# Patient Record
Sex: FEMALE | Race: WHITE | NOT HISPANIC OR LATINO | Employment: OTHER | URBAN - METROPOLITAN AREA
[De-identification: names, ages, dates, MRNs, and addresses within clinical notes are randomized per-mention and may not be internally consistent; named-entity substitution may affect disease eponyms.]

---

## 2017-09-06 ENCOUNTER — HISTORICAL (OUTPATIENT)
Dept: ADMINISTRATIVE | Facility: HOSPITAL | Age: 62
End: 2017-09-06

## 2017-09-06 LAB
CHOLEST SERPL-MCNC: 203 MG/DL (ref 100–199)
HDLC SERPL-MCNC: 66 MG/DL
LDLC SERPL CALC-MCNC: 115 MG/DL (ref 0–99)
TRIGL SERPL-MCNC: 108 MG/DL (ref 0–149)
VLDLC SERPL CALC-MCNC: 22 MG/DL (ref 5–40)

## 2018-09-11 ENCOUNTER — HISTORICAL (OUTPATIENT)
Dept: ADMINISTRATIVE | Facility: HOSPITAL | Age: 63
End: 2018-09-11

## 2018-11-07 LAB
BILIRUB SERPL-MCNC: NEGATIVE MG/DL
BLOOD URINE, POC: NORMAL
CLARITY, POC UA: CLEAR
COLOR, POC UA: NORMAL
GLUCOSE UR QL STRIP: NORMAL
KETONES UR QL STRIP: NEGATIVE
LEUKOCYTE EST, POC UA: NORMAL
NITRITE, POC UA: NEGATIVE
PH, POC UA: 6.5
PROTEIN, POC: NEGATIVE
SPECIFIC GRAVITY, POC UA: 1.02
UROBILINOGEN, POC UA: NORMAL

## 2019-08-28 ENCOUNTER — HISTORICAL (OUTPATIENT)
Dept: ADMINISTRATIVE | Facility: HOSPITAL | Age: 64
End: 2019-08-28

## 2019-08-28 LAB
ALBUMIN SERPL-MCNC: 4.3 G/DL (ref 3.6–4.8)
ALBUMIN/GLOB SERPL: 2.2 {RATIO} (ref 1.2–2.2)
ALP SERPL-CCNC: 75 IU/L (ref 39–117)
ALT SERPL-CCNC: 18 IU/L (ref 0–32)
AST SERPL-CCNC: 20 IU/L (ref 0–40)
BASOPHILS # BLD AUTO: 0 X10E3/UL (ref 0–0.2)
BASOPHILS NFR BLD AUTO: 1 %
BILIRUB SERPL-MCNC: 0.9 MG/DL (ref 0–1.2)
BUN SERPL-MCNC: 17 MG/DL (ref 8–27)
CALCIUM SERPL-MCNC: 9.5 MG/DL (ref 8.7–10.3)
CHLORIDE SERPL-SCNC: 104 MMOL/L (ref 96–106)
CHOLEST SERPL-MCNC: 207 MG/DL (ref 100–199)
CHOLEST/HDLC SERPL: 2.6 RATIO (ref 0–4.4)
CO2 SERPL-SCNC: 23 MMOL/L (ref 20–29)
CREAT SERPL-MCNC: 0.85 MG/DL (ref 0.57–1)
CREAT/UREA NIT SERPL: 20 (ref 12–28)
DEPRECATED CALCIDIOL+CALCIFEROL SERPL-MC: 34.7 NG/ML (ref 30–100)
EOSINOPHIL # BLD AUTO: 0.3 X10E3/UL (ref 0–0.4)
EOSINOPHIL NFR BLD AUTO: 6 %
ERYTHROCYTE [DISTWIDTH] IN BLOOD BY AUTOMATED COUNT: 12.9 % (ref 12.3–15.4)
GLOBULIN SER-MCNC: 2 G/DL (ref 1.5–4.5)
GLUCOSE SERPL-MCNC: 73 MG/DL (ref 65–99)
HCT VFR BLD AUTO: 43.9 % (ref 34–46.6)
HDLC SERPL-MCNC: 81 MG/DL
HGB BLD-MCNC: 14.4 G/DL (ref 11.1–15.9)
LDLC SERPL CALC-MCNC: 110 MG/DL (ref 0–99)
LYMPHOCYTES # BLD AUTO: 1.4 X10E3/UL (ref 0.7–3.1)
LYMPHOCYTES NFR BLD AUTO: 30 %
MCH RBC QN AUTO: 33.7 PG (ref 26.6–33)
MCHC RBC AUTO-ENTMCNC: 32.8 G/DL (ref 31.5–35.7)
MCV RBC AUTO: 103 FL (ref 79–97)
MONOCYTES # BLD AUTO: 0.3 X10E3/UL (ref 0.1–0.9)
MONOCYTES NFR BLD AUTO: 6 %
NEUTROPHILS # BLD AUTO: 2.7 X10E3/UL (ref 1.4–7)
NEUTROPHILS NFR BLD AUTO: 57 %
PLATELET # BLD AUTO: 188 X10E3/UL (ref 150–450)
POTASSIUM SERPL-SCNC: 4.3 MMOL/L (ref 3.5–5.2)
PROT SERPL-MCNC: 6.3 G/DL (ref 6–8.5)
RBC # BLD AUTO: 4.27 X10(6)/MCL (ref 3.77–5.28)
SODIUM SERPL-SCNC: 144 MMOL/L (ref 134–144)
TRIGL SERPL-MCNC: 78 MG/DL (ref 0–149)
TSH SERPL-ACNC: 2.39 MIU/ML (ref 0.45–4.5)
VLDLC SERPL CALC-MCNC: 16 MG/DL (ref 5–40)
WBC # SPEC AUTO: 4.8 X10E3/UL (ref 3.4–10.8)

## 2020-02-24 ENCOUNTER — HISTORICAL (OUTPATIENT)
Dept: ADMINISTRATIVE | Facility: HOSPITAL | Age: 65
End: 2020-02-24

## 2020-02-24 LAB
ALBUMIN SERPL-MCNC: 4.6 G/DL (ref 3.8–4.8)
ALBUMIN/GLOB SERPL: 2.6 {RATIO} (ref 1.2–2.2)
ALP SERPL-CCNC: 68 IU/L (ref 39–117)
ALT SERPL-CCNC: 20 IU/L (ref 0–32)
AST SERPL-CCNC: 18 IU/L (ref 0–40)
BASOPHILS # BLD AUTO: 0.1 X10E3/UL (ref 0–0.2)
BASOPHILS NFR BLD AUTO: 1 %
BILIRUB SERPL-MCNC: 0.6 MG/DL (ref 0–1.2)
BUN SERPL-MCNC: 20 MG/DL (ref 8–27)
CALCIUM SERPL-MCNC: 9.5 MG/DL (ref 8.7–10.3)
CHLORIDE SERPL-SCNC: 105 MMOL/L (ref 96–106)
CHOLEST SERPL-MCNC: 238 MG/DL (ref 100–199)
CHOLEST/HDLC SERPL: 2.9 RATIO (ref 0–4.4)
CO2 SERPL-SCNC: 27 MMOL/L (ref 20–29)
CREAT SERPL-MCNC: 0.91 MG/DL (ref 0.57–1)
CREAT/UREA NIT SERPL: 22 (ref 12–28)
EOSINOPHIL # BLD AUTO: 0.3 X10E3/UL (ref 0–0.4)
EOSINOPHIL NFR BLD AUTO: 6 %
ERYTHROCYTE [DISTWIDTH] IN BLOOD BY AUTOMATED COUNT: 12.3 % (ref 11.7–15.4)
GLOBULIN SER-MCNC: 1.8 G/DL (ref 1.5–4.5)
GLUCOSE SERPL-MCNC: 95 MG/DL (ref 65–99)
HCT VFR BLD AUTO: 45.1 % (ref 34–46.6)
HDLC SERPL-MCNC: 82 MG/DL
HGB BLD-MCNC: 14.6 G/DL (ref 11.1–15.9)
LDLC SERPL CALC-MCNC: 141 MG/DL (ref 0–99)
LYMPHOCYTES # BLD AUTO: 2 X10E3/UL (ref 0.7–3.1)
LYMPHOCYTES NFR BLD AUTO: 42 %
MCH RBC QN AUTO: 32.7 PG (ref 26.6–33)
MCHC RBC AUTO-ENTMCNC: 32.4 G/DL (ref 31.5–35.7)
MCV RBC AUTO: 101 FL (ref 79–97)
MONOCYTES # BLD AUTO: 0.3 X10E3/UL (ref 0.1–0.9)
MONOCYTES NFR BLD AUTO: 7 %
NEUTROPHILS # BLD AUTO: 2.1 X10E3/UL (ref 1.4–7)
NEUTROPHILS NFR BLD AUTO: 44 %
PLATELET # BLD AUTO: 228 X10E3/UL (ref 150–450)
POTASSIUM SERPL-SCNC: 4.9 MMOL/L (ref 3.5–5.2)
PROT SERPL-MCNC: 6.4 G/DL (ref 6–8.5)
RBC # BLD AUTO: 4.46 X10(6)/MCL (ref 3.77–5.28)
SODIUM SERPL-SCNC: 145 MMOL/L (ref 134–144)
TRIGL SERPL-MCNC: 73 MG/DL (ref 0–149)
TSH SERPL-ACNC: 2.6 MIU/ML (ref 0.45–4.5)
VLDLC SERPL CALC-MCNC: 15 MG/DL (ref 5–40)
WBC # SPEC AUTO: 4.8 X10E3/UL (ref 3.4–10.8)

## 2020-05-20 ENCOUNTER — HISTORICAL (OUTPATIENT)
Dept: ADMINISTRATIVE | Facility: HOSPITAL | Age: 65
End: 2020-05-20

## 2020-05-20 LAB
ALBUMIN SERPL-MCNC: 4.6 G/DL (ref 3.8–4.8)
ALBUMIN/GLOB SERPL: 2.4 {RATIO} (ref 1.2–2.2)
ALP SERPL-CCNC: 72 IU/L (ref 39–117)
ALT SERPL-CCNC: 20 IU/L (ref 0–32)
AST SERPL-CCNC: 24 IU/L (ref 0–40)
BILIRUB SERPL-MCNC: 0.5 MG/DL (ref 0–1.2)
BUN SERPL-MCNC: 19 MG/DL (ref 8–27)
CALCIUM SERPL-MCNC: 9.8 MG/DL (ref 8.7–10.3)
CHLORIDE SERPL-SCNC: 103 MMOL/L (ref 96–106)
CHOLEST SERPL-MCNC: 212 MG/DL (ref 100–199)
CHOLEST/HDLC SERPL: 2.5 RATIO (ref 0–4.4)
CO2 SERPL-SCNC: 25 MMOL/L (ref 20–29)
CREAT SERPL-MCNC: 0.94 MG/DL (ref 0.57–1)
CREAT/UREA NIT SERPL: 20 (ref 12–28)
DEPRECATED CALCIDIOL+CALCIFEROL SERPL-MC: 32.1 NG/ML (ref 30–100)
GLOBULIN SER-MCNC: 1.9 G/DL (ref 1.5–4.5)
GLUCOSE SERPL-MCNC: 88 MG/DL (ref 65–99)
HDLC SERPL-MCNC: 84 MG/DL
LDLC SERPL CALC-MCNC: 114 MG/DL (ref 0–99)
POTASSIUM SERPL-SCNC: 4.5 MMOL/L (ref 3.5–5.2)
PROT SERPL-MCNC: 6.5 G/DL (ref 6–8.5)
SODIUM SERPL-SCNC: 141 MMOL/L (ref 134–144)
TRIGL SERPL-MCNC: 71 MG/DL (ref 0–149)
TSH SERPL-ACNC: 3.76 MIU/ML (ref 0.45–4.5)
VLDLC SERPL CALC-MCNC: 14 MG/DL (ref 5–40)

## 2020-07-20 ENCOUNTER — HISTORICAL (OUTPATIENT)
Dept: RADIOLOGY | Facility: HOSPITAL | Age: 65
End: 2020-07-20

## 2020-08-17 ENCOUNTER — HISTORICAL (OUTPATIENT)
Dept: ADMINISTRATIVE | Facility: HOSPITAL | Age: 65
End: 2020-08-17

## 2020-08-17 LAB
ABS NEUT (OLG): 3.19
ALBUMIN SERPL-MCNC: 3.8 GM/DL (ref 3.4–4.8)
ALBUMIN/GLOB SERPL: 1.6 RATIO (ref 1.1–2)
ALP SERPL-CCNC: 66 UNIT/L (ref 40–150)
ALT SERPL-CCNC: 21 UNIT/L (ref 0–55)
APPEARANCE, UA: ABNORMAL
AST SERPL-CCNC: 17 UNIT/L (ref 5–34)
BACTERIA SPEC CULT: ABNORMAL
BASOPHILS # BLD AUTO: 0.03 X10(3)/MCL
BASOPHILS NFR BLD AUTO: 0.5 %
BILIRUB SERPL-MCNC: 0.9 MG/DL
BILIRUB UR QL STRIP: NEGATIVE
BILIRUBIN DIRECT+TOT PNL SERPL-MCNC: 0.3 MG/DL (ref 0–0.5)
BILIRUBIN DIRECT+TOT PNL SERPL-MCNC: 0.6 MG/DL
BUN SERPL-MCNC: 14 MG/DL (ref 9.8–20.1)
CALCIUM SERPL-MCNC: 9.3 MG/DL (ref 8.4–10.2)
CHLORIDE SERPL-SCNC: 105 MMOL/L (ref 98–107)
CHOLEST SERPL-MCNC: 227 MG/DL
CHOLEST/HDLC SERPL: 3 {RATIO} (ref 0–5)
CO2 SERPL-SCNC: 30 MEQ/L (ref 23–31)
COLOR UR: YELLOW
CREAT SERPL-MCNC: 0.84 MG/DL (ref 0.55–1.02)
EOSINOPHIL # BLD AUTO: 0.21 X10(3)/MCL
EOSINOPHIL NFR BLD AUTO: 3.7 %
ERYTHROCYTE [DISTWIDTH] IN BLOOD BY AUTOMATED COUNT: 14 %
GLOBULIN SER-MCNC: 2.4 GM/DL (ref 2.4–3.5)
GLUCOSE (UA): NEGATIVE
GLUCOSE SERPL-MCNC: 88 MG/DL (ref 82–115)
HCT VFR BLD AUTO: 45.1 % (ref 34–46)
HDLC SERPL-MCNC: 82 MG/DL (ref 35–60)
HGB BLD-MCNC: 14.4 GM/DL (ref 11.3–15.4)
HGB UR QL STRIP: ABNORMAL
IMM GRANULOCYTES # BLD AUTO: 0 10*3/UL (ref 0–0.1)
IMM GRANULOCYTES NFR BLD AUTO: 0 % (ref 0–1)
KETONES UR QL STRIP: NEGATIVE
LDLC SERPL CALC-MCNC: 126 MG/DL (ref 50–140)
LEUKOCYTE ESTERASE UR QL STRIP: NEGATIVE
LYMPHOCYTES # BLD AUTO: 1.88 X10(3)/MCL
LYMPHOCYTES NFR BLD AUTO: 32.8 %
MCH RBC QN AUTO: 32.4 PG (ref 27–33)
MCHC RBC AUTO-ENTMCNC: 31.9 GM/DL (ref 32–35)
MCV RBC AUTO: 101.6 FL (ref 81–97)
MONOCYTES # BLD AUTO: 0.43 X10(3)/MCL
MONOCYTES NFR BLD AUTO: 7.5 %
NEUTROPHILS # BLD AUTO: 3.19 X10(3)/MCL
NEUTROPHILS NFR BLD AUTO: 55.5 %
NITRITE UR QL STRIP: NEGATIVE
PH UR STRIP: 7 [PH] (ref 4.6–8)
PLATELET # BLD AUTO: 228 X10(3)/MCL (ref 140–450)
PMV BLD AUTO: 11 FL
POTASSIUM SERPL-SCNC: 4.6 MMOL/L (ref 3.5–5.1)
PROT SERPL-MCNC: 6.2 GM/DL (ref 5.8–7.6)
PROT UR QL STRIP: NEGATIVE
RBC # BLD AUTO: 4.44 X10(6)/MCL (ref 3.9–5)
RBC #/AREA URNS HPF: ABNORMAL /HPF
SODIUM SERPL-SCNC: 143 MMOL/L (ref 136–145)
SP GR UR STRIP: 1.02 (ref 1–1.03)
SQUAMOUS EPITHELIAL, UA: ABNORMAL
TRIGL SERPL-MCNC: 96 MG/DL (ref 37–140)
UROBILINOGEN UR STRIP-ACNC: 0.2
VLDLC SERPL CALC-MCNC: 19 MG/DL
WBC # SPEC AUTO: 5.74 X10(3)/MCL (ref 3.4–9.2)
WBC #/AREA URNS HPF: ABNORMAL /HPF

## 2020-12-08 ENCOUNTER — HISTORICAL (OUTPATIENT)
Dept: ADMINISTRATIVE | Facility: HOSPITAL | Age: 65
End: 2020-12-08

## 2020-12-08 LAB
ALBUMIN SERPL-MCNC: 3.9 GM/DL (ref 3.4–4.8)
ALBUMIN/GLOB SERPL: 1.8 RATIO (ref 1.1–2)
ALP SERPL-CCNC: 69 UNIT/L (ref 40–150)
ALT SERPL-CCNC: 26 UNIT/L (ref 0–55)
AST SERPL-CCNC: 18 UNIT/L (ref 5–34)
BILIRUB SERPL-MCNC: 0.6 MG/DL
BILIRUBIN DIRECT+TOT PNL SERPL-MCNC: 0.2 MG/DL (ref 0–0.5)
BILIRUBIN DIRECT+TOT PNL SERPL-MCNC: 0.4 MG/DL
BUN SERPL-MCNC: 14 MG/DL (ref 9.8–20.1)
CALCIUM SERPL-MCNC: 9.4 MG/DL (ref 8.4–10.2)
CHLORIDE SERPL-SCNC: 107 MMOL/L (ref 98–107)
CHOLEST SERPL-MCNC: 185 MG/DL
CHOLEST/HDLC SERPL: 3 {RATIO} (ref 0–5)
CO2 SERPL-SCNC: 30 MEQ/L (ref 23–31)
CREAT SERPL-MCNC: 0.85 MG/DL (ref 0.55–1.02)
GLOBULIN SER-MCNC: 2.2 GM/DL (ref 2.4–3.5)
GLUCOSE SERPL-MCNC: 92 MG/DL (ref 82–115)
HDLC SERPL-MCNC: 67 MG/DL (ref 35–60)
LDLC SERPL CALC-MCNC: 95 MG/DL (ref 50–140)
POTASSIUM SERPL-SCNC: 4.6 MMOL/L (ref 3.5–5.1)
PROT SERPL-MCNC: 6.1 GM/DL (ref 5.8–7.6)
SODIUM SERPL-SCNC: 142 MMOL/L (ref 136–145)
TRIGL SERPL-MCNC: 114 MG/DL (ref 37–140)
VLDLC SERPL CALC-MCNC: 23 MG/DL

## 2021-03-15 ENCOUNTER — HISTORICAL (OUTPATIENT)
Dept: RADIOLOGY | Facility: HOSPITAL | Age: 66
End: 2021-03-15

## 2021-03-19 ENCOUNTER — HISTORICAL (OUTPATIENT)
Dept: RADIOLOGY | Facility: HOSPITAL | Age: 66
End: 2021-03-19

## 2021-06-22 ENCOUNTER — HISTORICAL (OUTPATIENT)
Dept: ADMINISTRATIVE | Facility: HOSPITAL | Age: 66
End: 2021-06-22

## 2021-06-22 LAB
ABS NEUT (OLG): 2.08
ALBUMIN SERPL-MCNC: 4 GM/DL (ref 3.4–4.8)
ALBUMIN/GLOB SERPL: 1.7 RATIO (ref 1.1–2)
ALP SERPL-CCNC: 78 UNIT/L (ref 40–150)
ALT SERPL-CCNC: 28 UNIT/L (ref 0–55)
APPEARANCE, UA: ABNORMAL
AST SERPL-CCNC: 19 UNIT/L (ref 5–34)
BACTERIA SPEC CULT: ABNORMAL
BASOPHILS # BLD AUTO: 0.03 X10(3)/MCL
BASOPHILS NFR BLD AUTO: 0.7 %
BILIRUB SERPL-MCNC: 0.8 MG/DL
BILIRUB UR QL STRIP: NEGATIVE
BILIRUBIN DIRECT+TOT PNL SERPL-MCNC: 0.3 MG/DL (ref 0–0.5)
BILIRUBIN DIRECT+TOT PNL SERPL-MCNC: 0.5 MG/DL
BUN SERPL-MCNC: 16 MG/DL (ref 9.8–20.1)
CALCIUM SERPL-MCNC: 9.4 MG/DL (ref 8.4–10.2)
CHLORIDE SERPL-SCNC: 108 MMOL/L (ref 98–107)
CHOLEST SERPL-MCNC: 184 MG/DL
CHOLEST/HDLC SERPL: 3 {RATIO} (ref 0–5)
CO2 SERPL-SCNC: 32 MEQ/L (ref 23–31)
COLOR UR: YELLOW
CREAT SERPL-MCNC: 0.75 MG/DL (ref 0.55–1.02)
DEPRECATED CALCIDIOL+CALCIFEROL SERPL-MC: 34.4 NG/ML (ref 30–80)
EOSINOPHIL # BLD AUTO: 0.27 X10(3)/MCL
EOSINOPHIL NFR BLD AUTO: 6.1 %
ERYTHROCYTE [DISTWIDTH] IN BLOOD BY AUTOMATED COUNT: 14 %
GLOBULIN SER-MCNC: 2.3 GM/DL (ref 2.4–3.5)
GLUCOSE (UA): NEGATIVE
GLUCOSE SERPL-MCNC: 80 MG/DL (ref 82–115)
HCT VFR BLD AUTO: 43.5 % (ref 34–46)
HDLC SERPL-MCNC: 67 MG/DL (ref 35–60)
HGB BLD-MCNC: 13.8 GM/DL (ref 11.3–15.4)
HGB UR QL STRIP: ABNORMAL
IMM GRANULOCYTES # BLD AUTO: 0.01 10*3/UL (ref 0–0.1)
IMM GRANULOCYTES NFR BLD AUTO: 0.2 % (ref 0–1)
KETONES UR QL STRIP: NEGATIVE
LDLC SERPL CALC-MCNC: 102 MG/DL (ref 50–140)
LEUKOCYTE ESTERASE UR QL STRIP: NEGATIVE
LYMPHOCYTES # BLD AUTO: 1.7 X10(3)/MCL
LYMPHOCYTES NFR BLD AUTO: 38.6 %
MCH RBC QN AUTO: 31.9 PG (ref 27–33)
MCHC RBC AUTO-ENTMCNC: 31.7 GM/DL (ref 32–35)
MCV RBC AUTO: 100.5 FL (ref 81–97)
MONOCYTES # BLD AUTO: 0.31 X10(3)/MCL
MONOCYTES NFR BLD AUTO: 7 %
MUCOUS THREADS URNS QL MICRO: PRESENT
NEUTROPHILS # BLD AUTO: 2.08 X10(3)/MCL
NEUTROPHILS NFR BLD AUTO: 47.4 %
NITRITE UR QL STRIP: NEGATIVE
PH UR STRIP: 6 [PH] (ref 4.6–8)
PLATELET # BLD AUTO: 230 X10(3)/MCL (ref 140–450)
PMV BLD AUTO: 11 FL
POTASSIUM SERPL-SCNC: 4.8 MMOL/L (ref 3.5–5.1)
PROT SERPL-MCNC: 6.3 GM/DL (ref 5.8–7.6)
PROT UR QL STRIP: NEGATIVE
RBC # BLD AUTO: 4.33 X10(6)/MCL (ref 3.9–5)
RBC #/AREA URNS HPF: ABNORMAL /HPF
SODIUM SERPL-SCNC: 147 MMOL/L (ref 136–145)
SP GR UR STRIP: 1.02 (ref 1–1.03)
SQUAMOUS EPITHELIAL, UA: ABNORMAL
TRIGL SERPL-MCNC: 75 MG/DL (ref 37–140)
TSH SERPL-ACNC: 3.61 UIU/ML (ref 0.35–4.94)
UROBILINOGEN UR STRIP-ACNC: 0.2
VLDLC SERPL CALC-MCNC: 15 MG/DL
WBC # SPEC AUTO: 4.4 X10(3)/MCL (ref 3.4–9.2)
WBC #/AREA URNS HPF: ABNORMAL /HPF

## 2021-07-07 ENCOUNTER — HISTORICAL (OUTPATIENT)
Dept: LAB | Facility: HOSPITAL | Age: 66
End: 2021-07-07

## 2021-07-07 LAB — SARS-COV-2 AG RESP QL IA.RAPID: NEGATIVE

## 2021-07-09 ENCOUNTER — HISTORICAL (OUTPATIENT)
Dept: MEDSURG UNIT | Facility: HOSPITAL | Age: 66
End: 2021-07-09

## 2021-08-17 ENCOUNTER — HISTORICAL (OUTPATIENT)
Dept: ADMINISTRATIVE | Facility: HOSPITAL | Age: 66
End: 2021-08-17

## 2021-08-17 LAB — SARS-COV-2 RNA RESP QL NAA+PROBE: DETECTED

## 2022-02-14 ENCOUNTER — HISTORICAL (OUTPATIENT)
Dept: LAB | Facility: HOSPITAL | Age: 67
End: 2022-02-14

## 2022-02-14 LAB
ABS NEUT (OLG): 3.47
ALBUMIN SERPL-MCNC: 4 G/DL (ref 3.4–4.8)
ALBUMIN/GLOB SERPL: 1.5 {RATIO} (ref 1.1–2)
ALP SERPL-CCNC: 75 U/L (ref 40–150)
ALT SERPL-CCNC: 25 U/L (ref 0–55)
APPEARANCE, UA: CLEAR
APTT PPP: 22.5 S (ref 21.4–28.3)
AST SERPL-CCNC: 17 U/L (ref 5–34)
BACTERIA SPEC CULT: NORMAL
BASOPHILS # BLD AUTO: 0.03 10*3/UL
BASOPHILS NFR BLD AUTO: 0.6 %
BILIRUB SERPL-MCNC: 0.8 MG/DL
BILIRUB UR QL STRIP: NEGATIVE
BILIRUBIN DIRECT+TOT PNL SERPL-MCNC: 0.3 (ref 0–0.5)
BILIRUBIN DIRECT+TOT PNL SERPL-MCNC: 0.5
BUN SERPL-MCNC: 12 MG/DL (ref 9.8–20.1)
CALCIUM SERPL-MCNC: 9.5 MG/DL (ref 8.7–10.5)
CHLORIDE SERPL-SCNC: 106 MMOL/L (ref 98–107)
CO2 SERPL-SCNC: 29 MMOL/L (ref 23–31)
COLOR UR: YELLOW
CREAT SERPL-MCNC: 0.78 MG/DL (ref 0.55–1.02)
EOSINOPHIL # BLD AUTO: 0.22 10*3/UL
EOSINOPHIL NFR BLD AUTO: 4.1 %
ERYTHROCYTE [DISTWIDTH] IN BLOOD BY AUTOMATED COUNT: 13 %
GLOBULIN SER-MCNC: 2.6 G/DL (ref 2.4–3.5)
GLUCOSE (UA): NEGATIVE
GLUCOSE SERPL-MCNC: 89 MG/DL (ref 82–115)
HCT VFR BLD AUTO: 42.7 % (ref 34–46)
HEMOLYSIS INTERF INDEX SERPL-ACNC: 5
HGB BLD-MCNC: 13.6 G/DL (ref 11.3–15.4)
HGB UR QL STRIP: NEGATIVE
ICTERIC INTERF INDEX SERPL-ACNC: 1
IMM GRANULOCYTES # BLD AUTO: 0 10*3/UL (ref 0–0.1)
IMM GRANULOCYTES NFR BLD AUTO: 0 % (ref 0–1)
INR PPP: 0.9
KETONES UR QL STRIP: NEGATIVE
LEUKOCYTE ESTERASE UR QL STRIP: NEGATIVE
LIPEMIC INTERF INDEX SERPL-ACNC: 0
LYMPHOCYTES # BLD AUTO: 1.26 10*3/UL
LYMPHOCYTES NFR BLD AUTO: 23.7 %
MANUAL DIFF? (OHS): NO
MCH RBC QN AUTO: 32.2 PG (ref 27–33)
MCHC RBC AUTO-ENTMCNC: 31.9 G/DL (ref 32–35)
MCV RBC AUTO: 100.9 FL (ref 81–97)
MONOCYTES # BLD AUTO: 0.34 10*3/UL
MONOCYTES NFR BLD AUTO: 6.4 %
NEUTROPHILS # BLD AUTO: 3.47 10*3/UL
NEUTROPHILS NFR BLD AUTO: 65.2 %
NITRITE UR QL STRIP: NEGATIVE
PH UR STRIP: 6 [PH] (ref 4.6–8)
PLATELET # BLD AUTO: 222 10*3/UL (ref 140–450)
PMV BLD AUTO: 10 FL
POTASSIUM SERPL-SCNC: 3.8 MMOL/L (ref 3.5–5.1)
PROT SERPL-MCNC: 6.6 G/DL (ref 5.8–7.6)
PROT UR QL STRIP: NEGATIVE
PROTHROMBIN TIME: 9.6 S (ref 9.1–10.9)
RBC # BLD AUTO: 4.23 10*6/UL (ref 3.9–5)
RBC #/AREA URNS HPF: NORMAL /[HPF]
SARS-COV-2 AG RESP QL IA.RAPID: NEGATIVE
SODIUM SERPL-SCNC: 142 MMOL/L (ref 136–145)
SP GR UR STRIP: 1.01 (ref 1–1.03)
SQUAMOUS EPITHELIAL, UA: NORMAL
UROBILINOGEN UR STRIP-ACNC: 0.2
WBC # SPEC AUTO: 5.32 10*3/UL (ref 3.4–9.2)
WBC #/AREA URNS HPF: NORMAL /[HPF] (ref 0–3)

## 2022-02-16 ENCOUNTER — HISTORICAL (OUTPATIENT)
Dept: MEDSURG UNIT | Facility: HOSPITAL | Age: 67
End: 2022-02-16

## 2022-04-07 ENCOUNTER — HISTORICAL (OUTPATIENT)
Dept: ADMINISTRATIVE | Facility: HOSPITAL | Age: 67
End: 2022-04-07
Payer: MEDICARE

## 2022-04-24 VITALS
DIASTOLIC BLOOD PRESSURE: 65 MMHG | SYSTOLIC BLOOD PRESSURE: 101 MMHG | WEIGHT: 145 LBS | BODY MASS INDEX: 24.16 KG/M2 | OXYGEN SATURATION: 97 % | HEIGHT: 65 IN

## 2022-05-14 NOTE — OP NOTE
Patient:   Audrey Braxton             MRN: [id number]            FIN: [id number]               Age:   66 years     Sex:  Female     :  1955   Associated Diagnoses:   Calculus of gallbladder with chronic cholecystitis without obstruction   Author:   Nayeli Wu MD      Operative Note   Operative Information   Date/ Time:  2022 09:29:00.     Procedures Performed: Procedure Code   Laparoscopy, surgical; cholecystectomy (13993)..     Indications: 66-year-old white female with ongoing complaint of right upper quadrant pain discomfort associated with meals undergone extensive work-up with only findings of cholelithiasis electing to undergo cholecystectomy for treatment of chronic cholecystitis.     Preoperative Diagnosis: Calculus of gallbladder with chronic cholecystitis without obstruction (MMK20-BS K80.10).     Postoperative Diagnosis: Calculus of gallbladder with chronic cholecystitis without obstruction (HWC90-VI K80.10).     Surgeon: Nayeli Wu MD.     Anesthesia: General.     Speciman Removed: Gallbladder.     Description of Procedure/Findings/    Complications: After the proper consent was obtained patient was brought to the operating theater laid in the supine position general endotracheal intubation and anesthesia was performed.  An NG tube was placed prior to beginning the procedure and the abdomen was sterilely prepped and draped in normal surgical fashion.  An incision was made in the supraumbilical region after infiltration 1% lidocaine and epinephrine using a #15 blade.  Dissection was carried down to the level of the fascia and the abdomen was entered carefully using a  Veress needle.  The abdomen was then insufflated to 15 mmHg,  a 5 mm Visiport was used to enter the abdomen and the abdominal contents were visually inspected.  Under direct visualization secondary trochars were inserted into the abdomen, a 5 mm trocar was inserted in the subxiphoid region and 2 5 mm trochars  were inserted and the right quadrant.  There were no injuries noted during insertion of the trochars.  The patient was then placed in reverse Trendelenburg position with right side up.  There were firm adhesions between the gallbladder and omentum which were lysed sharply.   The dome of the gallbladder was then grasped with atraumatic grasper through the most lateral port and retracted over the dome of the liver the infundibulum was then grasped using an atraumatic grasper through the midclavicular port and retracted to the right lower quadrant.  This was able to expose the triangle of calot.  The peritoneum overlying the gallbladder infundibulum was then incised and the cystic duct and artery were identified and circumferentially dissected carefully.   This created the critical view of the cystic duct and artery entering clearly into the gallbladder with the liver in the background.   The cystic duct and cystic artery were then doubly clipped and divided close to the gallbladder. The gallbladder was then dissected from its peritoneal attachments using electrocautery.   Hemostasis was checked and the gallbladder was placed within an endoscopic retrieval bag and removed through the subxiphoid port.  The gallbladder was then passed off the table as a specimen.  The gallbladder fossa was irrigated with saline to assure hemostasis.  No evidence of bleeding from the fossa or the cystic artery were identified.  There was no leakage noted from the cystic stump.  Secondary trochars were then removed under direct visualization.  There was no noted bleeding at any of the trocar sites.  The laparoscope was then withdrawn through the umbilical port and insufflation was allowed to escape.    The skin was closed in a subcuticular fashion using 4-0 Monocryl in a sterile dressing was placed upon the skin.  The patient tolerated the procedure well and was transferred to the postanesthesia care unit in stable condition..     Esimated  blood loss: loss  5  cc.     Findings: Chronic cholecystitis with extensive intra-abdominal adhesions.     Complications: None.

## 2022-06-14 ENCOUNTER — LAB VISIT (OUTPATIENT)
Dept: LAB | Facility: HOSPITAL | Age: 67
End: 2022-06-14
Attending: NURSE PRACTITIONER
Payer: MEDICARE

## 2022-06-14 ENCOUNTER — CLINICAL SUPPORT (OUTPATIENT)
Dept: FAMILY MEDICINE | Facility: CLINIC | Age: 67
End: 2022-06-14
Payer: MEDICARE

## 2022-06-14 DIAGNOSIS — R10.9 ABDOMINAL PAIN, UNSPECIFIED ABDOMINAL LOCATION: ICD-10-CM

## 2022-06-14 DIAGNOSIS — F41.9 ANXIETY: ICD-10-CM

## 2022-06-14 DIAGNOSIS — E78.5 HYPERLIPIDEMIA, UNSPECIFIED HYPERLIPIDEMIA TYPE: Primary | ICD-10-CM

## 2022-06-14 DIAGNOSIS — E55.9 VITAMIN D DEFICIENCY: ICD-10-CM

## 2022-06-14 DIAGNOSIS — E78.5 HYPERLIPIDEMIA, UNSPECIFIED HYPERLIPIDEMIA TYPE: ICD-10-CM

## 2022-06-14 LAB
ALBUMIN SERPL-MCNC: 4.1 GM/DL (ref 3.4–4.8)
ALBUMIN/GLOB SERPL: 1.8 RATIO (ref 1.1–2)
ALP SERPL-CCNC: 80 UNIT/L (ref 40–150)
ALT SERPL-CCNC: 21 UNIT/L (ref 0–55)
AMORPH PHOS CRY URNS QL MICRO: ABNORMAL /HPF
APPEARANCE UR: CLEAR
AST SERPL-CCNC: 20 UNIT/L (ref 5–34)
BACTERIA #/AREA URNS AUTO: ABNORMAL /HPF
BASOPHILS # BLD AUTO: 0.03 X10(3)/MCL (ref 0–0.2)
BASOPHILS NFR BLD AUTO: 0.5 %
BILIRUB UR QL STRIP.AUTO: NEGATIVE MG/DL
BILIRUBIN DIRECT+TOT PNL SERPL-MCNC: 0.9 MG/DL
BUN SERPL-MCNC: 15 MG/DL (ref 9.8–20.1)
CALCIUM SERPL-MCNC: 9.5 MG/DL (ref 8.4–10.2)
CHLORIDE SERPL-SCNC: 106 MMOL/L (ref 98–107)
CHOLEST SERPL-MCNC: 182 MG/DL
CHOLEST/HDLC SERPL: 3 {RATIO} (ref 0–5)
CO2 SERPL-SCNC: 31 MMOL/L (ref 23–31)
COLOR UR AUTO: YELLOW
CREAT SERPL-MCNC: 0.9 MG/DL (ref 0.55–1.02)
EOSINOPHIL # BLD AUTO: 0.26 X10(3)/MCL (ref 0–0.9)
EOSINOPHIL NFR BLD AUTO: 4.4 %
ERYTHROCYTE [DISTWIDTH] IN BLOOD BY AUTOMATED COUNT: 13.2 % (ref 11.5–17)
GLOBULIN SER-MCNC: 2.3 GM/DL (ref 2.4–3.5)
GLUCOSE SERPL-MCNC: 89 MG/DL (ref 82–115)
GLUCOSE UR QL STRIP.AUTO: NEGATIVE MG/DL
HCT VFR BLD AUTO: 45.9 % (ref 37–47)
HDLC SERPL-MCNC: 68 MG/DL (ref 35–60)
HGB BLD-MCNC: 14.5 GM/DL (ref 12–16)
IMM GRANULOCYTES # BLD AUTO: 0.01 X10(3)/MCL (ref 0–0.02)
IMM GRANULOCYTES NFR BLD AUTO: 0.2 % (ref 0–0.43)
KETONES UR QL STRIP.AUTO: NEGATIVE MG/DL
LDLC SERPL CALC-MCNC: 94 MG/DL (ref 50–140)
LEUKOCYTE ESTERASE UR QL STRIP.AUTO: NEGATIVE UNIT/L
LYMPHOCYTES # BLD AUTO: 2.29 X10(3)/MCL (ref 0.6–4.6)
LYMPHOCYTES NFR BLD AUTO: 38.7 %
MCH RBC QN AUTO: 32.4 PG (ref 27–31)
MCHC RBC AUTO-ENTMCNC: 31.6 MG/DL (ref 33–36)
MCV RBC AUTO: 102.7 FL (ref 80–94)
MONOCYTES # BLD AUTO: 0.35 X10(3)/MCL (ref 0.1–1.3)
MONOCYTES NFR BLD AUTO: 5.9 %
MUCOUS THREADS URNS QL MICRO: ABNORMAL /LPF
NEUTROPHILS # BLD AUTO: 3 X10(3)/MCL (ref 2.1–9.2)
NEUTROPHILS NFR BLD AUTO: 50.3 %
NITRITE UR QL STRIP.AUTO: NEGATIVE
NRBC BLD AUTO-RTO: 0 %
PH UR STRIP.AUTO: 7 [PH]
PLATELET # BLD AUTO: 232 X10(3)/MCL (ref 130–400)
PMV BLD AUTO: 11 FL (ref 9.4–12.4)
POTASSIUM SERPL-SCNC: 4.7 MMOL/L (ref 3.5–5.1)
PROT SERPL-MCNC: 6.4 GM/DL (ref 5.8–7.6)
PROT UR QL STRIP.AUTO: NEGATIVE MG/DL
RBC # BLD AUTO: 4.47 X10(6)/MCL (ref 4.2–5.4)
RBC #/AREA URNS AUTO: ABNORMAL /HPF
RBC UR QL AUTO: NEGATIVE UNIT/L
SODIUM SERPL-SCNC: 145 MMOL/L (ref 136–145)
SP GR UR STRIP.AUTO: 1.02
SQUAMOUS #/AREA URNS AUTO: ABNORMAL /LPF
TRIGL SERPL-MCNC: 102 MG/DL (ref 37–140)
TSH SERPL-ACNC: 2.24 UIU/ML (ref 0.35–4.94)
UROBILINOGEN UR STRIP-ACNC: 1 MG/DL
VLDLC SERPL CALC-MCNC: 20 MG/DL
WBC # SPEC AUTO: 5.9 X10(3)/MCL (ref 4.5–11.5)
WBC #/AREA URNS AUTO: ABNORMAL /HPF

## 2022-06-14 PROCEDURE — 36415 COLL VENOUS BLD VENIPUNCTURE: CPT

## 2022-06-14 PROCEDURE — 80061 LIPID PANEL: CPT

## 2022-06-14 PROCEDURE — 80053 COMPREHEN METABOLIC PANEL: CPT

## 2022-06-14 PROCEDURE — 82306 VITAMIN D 25 HYDROXY: CPT

## 2022-06-14 PROCEDURE — 85025 COMPLETE CBC W/AUTO DIFF WBC: CPT

## 2022-06-14 PROCEDURE — 84443 ASSAY THYROID STIM HORMONE: CPT

## 2022-06-15 LAB — DEPRECATED CALCIDIOL+CALCIFEROL SERPL-MC: 61 NG/ML (ref 30–80)

## 2022-06-23 ENCOUNTER — OFFICE VISIT (OUTPATIENT)
Dept: FAMILY MEDICINE | Facility: CLINIC | Age: 67
End: 2022-06-23
Payer: MEDICARE

## 2022-06-23 VITALS
RESPIRATION RATE: 20 BRPM | WEIGHT: 145 LBS | HEIGHT: 65 IN | DIASTOLIC BLOOD PRESSURE: 70 MMHG | TEMPERATURE: 97 F | HEART RATE: 70 BPM | SYSTOLIC BLOOD PRESSURE: 110 MMHG | BODY MASS INDEX: 24.16 KG/M2

## 2022-06-23 DIAGNOSIS — Z78.0 POST-MENOPAUSAL: ICD-10-CM

## 2022-06-23 DIAGNOSIS — R07.81 RIB PAIN ON LEFT SIDE: ICD-10-CM

## 2022-06-23 DIAGNOSIS — E78.5 HYPERLIPIDEMIA, UNSPECIFIED HYPERLIPIDEMIA TYPE: ICD-10-CM

## 2022-06-23 DIAGNOSIS — F41.9 ANXIETY: ICD-10-CM

## 2022-06-23 PROCEDURE — 3008F BODY MASS INDEX DOCD: CPT | Mod: ,,, | Performed by: NURSE PRACTITIONER

## 2022-06-23 PROCEDURE — 1159F PR MEDICATION LIST DOCUMENTED IN MEDICAL RECORD: ICD-10-PCS | Mod: ,,, | Performed by: NURSE PRACTITIONER

## 2022-06-23 PROCEDURE — 99213 PR OFFICE/OUTPT VISIT, EST, LEVL III, 20-29 MIN: ICD-10-PCS | Mod: ,,, | Performed by: NURSE PRACTITIONER

## 2022-06-23 PROCEDURE — 3008F PR BODY MASS INDEX (BMI) DOCUMENTED: ICD-10-PCS | Mod: ,,, | Performed by: NURSE PRACTITIONER

## 2022-06-23 PROCEDURE — 99213 OFFICE O/P EST LOW 20 MIN: CPT | Mod: ,,, | Performed by: NURSE PRACTITIONER

## 2022-06-23 PROCEDURE — 3078F PR MOST RECENT DIASTOLIC BLOOD PRESSURE < 80 MM HG: ICD-10-PCS | Mod: ,,, | Performed by: NURSE PRACTITIONER

## 2022-06-23 PROCEDURE — 3074F SYST BP LT 130 MM HG: CPT | Mod: ,,, | Performed by: NURSE PRACTITIONER

## 2022-06-23 PROCEDURE — 3078F DIAST BP <80 MM HG: CPT | Mod: ,,, | Performed by: NURSE PRACTITIONER

## 2022-06-23 PROCEDURE — 3074F PR MOST RECENT SYSTOLIC BLOOD PRESSURE < 130 MM HG: ICD-10-PCS | Mod: ,,, | Performed by: NURSE PRACTITIONER

## 2022-06-23 PROCEDURE — 1159F MED LIST DOCD IN RCRD: CPT | Mod: ,,, | Performed by: NURSE PRACTITIONER

## 2022-06-23 RX ORDER — LORATADINE 10 MG/1
1 TABLET ORAL DAILY
COMMUNITY
Start: 2022-02-11

## 2022-06-23 RX ORDER — MONTELUKAST SODIUM 10 MG/1
TABLET ORAL
COMMUNITY
Start: 2022-02-18 | End: 2022-08-23 | Stop reason: SDUPTHER

## 2022-06-23 RX ORDER — SERTRALINE HCL 50 MG
75 TABLET ORAL EVERY MORNING
COMMUNITY
Start: 2022-04-19 | End: 2024-03-27

## 2022-06-23 RX ORDER — LORAZEPAM 2 MG/1
2.5 TABLET ORAL NIGHTLY
COMMUNITY
Start: 2022-04-19

## 2022-06-23 RX ORDER — PITAVASTATIN CALCIUM 2.09 MG/1
TABLET, FILM COATED ORAL
COMMUNITY
Start: 2022-04-11 | End: 2022-09-15 | Stop reason: SDUPTHER

## 2022-06-23 NOTE — PROGRESS NOTES
Subjective:       Patient ID: Audrey Braxton is a 67 y.o. female.    Chief Complaint: Follow-up (6 month follow up; lab results)    Vitals:    06/23/22 1306   BP: 110/70   Pulse: 70   Resp: 20   Temp: 97.3 °F (36.3 °C)        The patient is a 67-year-old female who presents for a checkup.  SHe states she fell while back and she thinks she broke a rib.  She states she has had pain on the left side from mid chest radiating all the way around to her back.  She states the pain was severe the beginning 10/10 but now she states the pain is better 7/10 is worst it gets.  She states the pain is achy.  She states the pain is intermittent.  Relief measures have been Advil and ibuprofen with moderate relief.      Review of Systems   Constitutional: Negative.    HENT: Negative.    Eyes: Negative.    Respiratory: Negative.    Cardiovascular: Negative.    Gastrointestinal: Negative.    Endocrine: Negative.    Genitourinary: Negative.    Musculoskeletal: Positive for arthralgias, back pain and myalgias.   Integumentary:  Negative.   Neurological: Negative.    Hematological: Negative.            Objective:        Physical Exam  Vitals and nursing note reviewed.   HENT:      Head: Normocephalic.      Right Ear: Tympanic membrane normal.      Left Ear: Tympanic membrane normal.      Nose: Nose normal.      Mouth/Throat:      Mouth: Mucous membranes are moist.   Eyes:      Pupils: Pupils are equal, round, and reactive to light.   Cardiovascular:      Rate and Rhythm: Normal rate and regular rhythm.      Heart sounds: No murmur heard.  Pulmonary:      Effort: Pulmonary effort is normal.      Breath sounds: Normal breath sounds.   Abdominal:      General: Bowel sounds are normal.      Palpations: Abdomen is soft.   Musculoskeletal:         General: Normal range of motion.      Cervical back: Normal range of motion and neck supple.   Skin:     General: Skin is warm and dry.   Neurological:      Mental Status: She is alert and oriented to  person, place, and time.         Assessment:     rib pain on the left    Postmenopausal    Hyperlipidemia    Anxiety    Problem List Items Addressed This Visit    None         Plan:     rib pain on the left:  X-ray left ribcage    Postmenopausal:  Bone density    Hyperlipidemia:  Controlled on current regimen Livalo    Anxiety:  Controlled on current regimen      Past Medical History:   Diagnosis Date    Allergy     Anxiety     Cataract     Depression     Hyperlipidemia         Review of patient's allergies indicates:   Allergen Reactions    Amoxicillin-pot clavulanate Diarrhea        Current Outpatient Medications   Medication Sig Dispense Refill    ATIVAN 2 mg Tab Take 2.5 tablets by mouth every evening.      LIVALO 2 mg Tab tablet       loratadine (CLARITIN) 10 mg tablet Take 1 tablet by mouth Daily.      montelukast (SINGULAIR) 10 mg tablet       ZOLOFT 50 mg tablet Take 75 mg by mouth every morning.       No current facility-administered medications for this visit.          There is no problem list on file for this patient.            Past Medical History:   Diagnosis Date    Allergy     Anxiety     Cataract     Depression     Hyperlipidemia           Past Surgical History:   Procedure Laterality Date    CATARACT EXTRACTION      CHOLECYSTECTOMY      EYE SURGERY             reports that she has been smoking cigarettes. She has been smoking about 0.50 packs per day. She has never used smokeless tobacco. She reports current alcohol use of about 1.0 standard drink of alcohol per week. She reports that she does not use drugs.      There is no immunization history on file for this patient.     Health Maintenance   Topic Date Due    Hepatitis C Screening  Never done    TETANUS VACCINE  Never done    DEXA Scan  Never done    Mammogram  03/15/2022    Lipid Panel  06/14/2027

## 2022-06-30 ENCOUNTER — HOSPITAL ENCOUNTER (OUTPATIENT)
Dept: RADIOLOGY | Facility: HOSPITAL | Age: 67
Discharge: HOME | End: 2022-06-30
Attending: NURSE PRACTITIONER
Payer: MEDICARE

## 2022-06-30 ENCOUNTER — HOSPITAL ENCOUNTER (OUTPATIENT)
Dept: RADIOLOGY | Facility: HOSPITAL | Age: 67
Discharge: HOME | End: 2022-06-30
Attending: OBSTETRICS & GYNECOLOGY
Payer: MEDICARE

## 2022-06-30 DIAGNOSIS — Z12.31 BREAST CANCER SCREENING BY MAMMOGRAM: ICD-10-CM

## 2022-06-30 DIAGNOSIS — R07.81 RIB PAIN ON LEFT SIDE: ICD-10-CM

## 2022-06-30 DIAGNOSIS — Z78.0 POST-MENOPAUSAL: ICD-10-CM

## 2022-06-30 PROCEDURE — 77080 DXA BONE DENSITY AXIAL: CPT | Mod: 26,,, | Performed by: PREVENTIVE MEDICINE

## 2022-06-30 PROCEDURE — 77063 MAMMO DIGITAL SCREENING BILAT WITH TOMO: ICD-10-PCS | Mod: 26,,, | Performed by: RADIOLOGY

## 2022-06-30 PROCEDURE — 77080 DXA BONE DENSITY AXIAL: CPT | Mod: TC

## 2022-06-30 PROCEDURE — 77080 DEXA BONE DENSITY SPINE HIP: ICD-10-PCS | Mod: 26,,, | Performed by: PREVENTIVE MEDICINE

## 2022-06-30 PROCEDURE — 77067 SCR MAMMO BI INCL CAD: CPT | Mod: TC

## 2022-06-30 PROCEDURE — 77063 BREAST TOMOSYNTHESIS BI: CPT | Mod: 26,,, | Performed by: RADIOLOGY

## 2022-06-30 PROCEDURE — 77067 MAMMO DIGITAL SCREENING BILAT WITH TOMO: ICD-10-PCS | Mod: 26,,, | Performed by: RADIOLOGY

## 2022-06-30 PROCEDURE — 71100 X-RAY EXAM RIBS UNI 2 VIEWS: CPT | Mod: TC,LT

## 2022-06-30 PROCEDURE — 77067 SCR MAMMO BI INCL CAD: CPT | Mod: 26,,, | Performed by: RADIOLOGY

## 2022-07-26 ENCOUNTER — OFFICE VISIT (OUTPATIENT)
Dept: FAMILY MEDICINE | Facility: CLINIC | Age: 67
End: 2022-07-26
Payer: MEDICARE

## 2022-07-26 VITALS
HEIGHT: 65 IN | BODY MASS INDEX: 23.99 KG/M2 | DIASTOLIC BLOOD PRESSURE: 74 MMHG | WEIGHT: 144 LBS | RESPIRATION RATE: 20 BRPM | SYSTOLIC BLOOD PRESSURE: 113 MMHG | HEART RATE: 67 BPM | TEMPERATURE: 97 F

## 2022-07-26 DIAGNOSIS — R19.7 DIARRHEA, UNSPECIFIED TYPE: ICD-10-CM

## 2022-07-26 DIAGNOSIS — R10.12 LEFT UPPER QUADRANT ABDOMINAL PAIN: ICD-10-CM

## 2022-07-26 PROBLEM — N30.00 ACUTE CYSTITIS WITHOUT HEMATURIA: Status: ACTIVE | Noted: 2022-07-26

## 2022-07-26 PROCEDURE — 99215 PR OFFICE/OUTPT VISIT, EST, LEVL V, 40-54 MIN: ICD-10-PCS | Mod: ,,, | Performed by: NURSE PRACTITIONER

## 2022-07-26 PROCEDURE — 1159F PR MEDICATION LIST DOCUMENTED IN MEDICAL RECORD: ICD-10-PCS | Mod: ,,, | Performed by: NURSE PRACTITIONER

## 2022-07-26 PROCEDURE — 1159F MED LIST DOCD IN RCRD: CPT | Mod: ,,, | Performed by: NURSE PRACTITIONER

## 2022-07-26 PROCEDURE — 3078F PR MOST RECENT DIASTOLIC BLOOD PRESSURE < 80 MM HG: ICD-10-PCS | Mod: ,,, | Performed by: NURSE PRACTITIONER

## 2022-07-26 PROCEDURE — 3074F SYST BP LT 130 MM HG: CPT | Mod: ,,, | Performed by: NURSE PRACTITIONER

## 2022-07-26 PROCEDURE — 3008F PR BODY MASS INDEX (BMI) DOCUMENTED: ICD-10-PCS | Mod: ,,, | Performed by: NURSE PRACTITIONER

## 2022-07-26 PROCEDURE — 99215 OFFICE O/P EST HI 40 MIN: CPT | Mod: ,,, | Performed by: NURSE PRACTITIONER

## 2022-07-26 PROCEDURE — 3074F PR MOST RECENT SYSTOLIC BLOOD PRESSURE < 130 MM HG: ICD-10-PCS | Mod: ,,, | Performed by: NURSE PRACTITIONER

## 2022-07-26 PROCEDURE — 3078F DIAST BP <80 MM HG: CPT | Mod: ,,, | Performed by: NURSE PRACTITIONER

## 2022-07-26 PROCEDURE — 3008F BODY MASS INDEX DOCD: CPT | Mod: ,,, | Performed by: NURSE PRACTITIONER

## 2022-07-26 RX ORDER — CIPROFLOXACIN 500 MG/1
500 TABLET ORAL EVERY 12 HOURS
Qty: 14 TABLET | Refills: 0 | Status: SHIPPED | OUTPATIENT
Start: 2022-07-26 | End: 2022-07-26 | Stop reason: CLARIF

## 2022-07-26 RX ORDER — ACETAMINOPHEN 500 MG
5000 TABLET ORAL DAILY
COMMUNITY

## 2022-07-26 NOTE — PROGRESS NOTES
A phone and this  Subjective:       Patient ID: Audrey Braxton is a 67 y.o. female.    Chief Complaint: Diarrhea (Diarrhea X's 5 days)    The patient states that she has diarrhea on and off for 5 days now.  She states what concerns her is that the diarrhea is green in color.  She denies eating any foods that might cause this.  She is taking supplements.      Review of Systems   Gastrointestinal: Positive for abdominal pain and diarrhea.   All other systems reviewed and are negative.        Objective:      Physical Exam  Vitals and nursing note reviewed.   HENT:      Head: Normocephalic.      Right Ear: Tympanic membrane normal.      Left Ear: Tympanic membrane normal.      Nose: Nose normal.   Eyes:      Extraocular Movements: Extraocular movements intact.   Cardiovascular:      Rate and Rhythm: Normal rate and regular rhythm.      Heart sounds: No murmur heard.  Pulmonary:      Effort: Pulmonary effort is normal.      Breath sounds: Normal breath sounds.   Abdominal:      General: Bowel sounds are normal.      Palpations: Abdomen is soft.      Tenderness: There is abdominal tenderness.      Comments: Exquisite tenderness on palpation of the left upper quadrant.  It   Musculoskeletal:         General: Normal range of motion.      Cervical back: Normal range of motion and neck supple.   Skin:     General: Skin is warm and dry.   Neurological:      Mental Status: She is alert and oriented to person, place, and time.         Assessment:       Problem List Items Addressed This Visit        GI    Left upper quadrant abdominal pain    Relevant Orders    Stool Culture    Clostridium Diff Toxin, A & B, EIA    CT Abdomen Without Contrast    Diarrhea          Plan:         diarrhea:  Kendall diet to include Bananas, Rice, Applesauce and toast

## 2022-07-27 DIAGNOSIS — R19.7 DIARRHEA, UNSPECIFIED TYPE: Primary | ICD-10-CM

## 2022-07-29 ENCOUNTER — LAB VISIT (OUTPATIENT)
Dept: LAB | Facility: HOSPITAL | Age: 67
End: 2022-07-29
Attending: NURSE PRACTITIONER
Payer: MEDICARE

## 2022-07-29 DIAGNOSIS — R19.7 DIARRHEA, UNSPECIFIED TYPE: ICD-10-CM

## 2022-07-29 LAB
CLOSTRIDIUM DIFFICILE GDH ANTIGEN (OHS): NEGATIVE
CLOSTRIDIUM DIFFICILE TOXIN A/B (OHS): NEGATIVE

## 2022-07-29 PROCEDURE — 87045 FECES CULTURE AEROBIC BACT: CPT

## 2022-08-01 ENCOUNTER — DOCUMENTATION ONLY (OUTPATIENT)
Dept: ADMINISTRATIVE | Facility: HOSPITAL | Age: 67
End: 2022-08-01
Payer: MEDICARE

## 2022-08-01 LAB
BACTERIA STL CULT: NORMAL
CRC RECOMMENDATION EXT: NORMAL

## 2022-08-05 ENCOUNTER — HOSPITAL ENCOUNTER (OUTPATIENT)
Dept: RADIOLOGY | Facility: HOSPITAL | Age: 67
Discharge: HOME | End: 2022-08-05
Attending: NURSE PRACTITIONER
Payer: MEDICARE

## 2022-08-05 DIAGNOSIS — R10.12 LEFT UPPER QUADRANT ABDOMINAL PAIN: ICD-10-CM

## 2022-08-05 PROCEDURE — 74150 CT ABDOMEN W/O CONTRAST: CPT | Mod: TC

## 2022-08-08 ENCOUNTER — TELEPHONE (OUTPATIENT)
Dept: FAMILY MEDICINE | Facility: CLINIC | Age: 67
End: 2022-08-08
Payer: MEDICARE

## 2022-08-08 DIAGNOSIS — R10.9 ABDOMINAL PAIN, UNSPECIFIED ABDOMINAL LOCATION: Primary | ICD-10-CM

## 2022-08-08 NOTE — TELEPHONE ENCOUNTER
I called and spoke with the patient.  He states his she continues to have abdominal pain.  Her CT of the abdomen was clear so I will send her to Gastroenterology.  Referral put in

## 2022-08-23 RX ORDER — MONTELUKAST SODIUM 10 MG/1
10 TABLET ORAL DAILY
Qty: 30 TABLET | Refills: 11 | Status: SHIPPED | OUTPATIENT
Start: 2022-08-23 | End: 2023-09-13 | Stop reason: SDUPTHER

## 2022-09-15 ENCOUNTER — OFFICE VISIT (OUTPATIENT)
Dept: FAMILY MEDICINE | Facility: CLINIC | Age: 67
End: 2022-09-15
Payer: MEDICARE

## 2022-09-15 VITALS
DIASTOLIC BLOOD PRESSURE: 73 MMHG | TEMPERATURE: 97 F | HEART RATE: 65 BPM | SYSTOLIC BLOOD PRESSURE: 116 MMHG | HEIGHT: 64 IN | RESPIRATION RATE: 20 BRPM | BODY MASS INDEX: 23.9 KG/M2 | WEIGHT: 140 LBS

## 2022-09-15 DIAGNOSIS — R05.9 COUGH: ICD-10-CM

## 2022-09-15 DIAGNOSIS — J01.00 ACUTE MAXILLARY SINUSITIS, RECURRENCE NOT SPECIFIED: ICD-10-CM

## 2022-09-15 DIAGNOSIS — R59.1 LYMPHADENOPATHY: ICD-10-CM

## 2022-09-15 DIAGNOSIS — E78.5 HYPERLIPIDEMIA, UNSPECIFIED HYPERLIPIDEMIA TYPE: Primary | ICD-10-CM

## 2022-09-15 PROCEDURE — 99213 PR OFFICE/OUTPT VISIT, EST, LEVL III, 20-29 MIN: ICD-10-PCS | Mod: 25,,, | Performed by: NURSE PRACTITIONER

## 2022-09-15 PROCEDURE — 1159F PR MEDICATION LIST DOCUMENTED IN MEDICAL RECORD: ICD-10-PCS | Mod: ,,, | Performed by: NURSE PRACTITIONER

## 2022-09-15 PROCEDURE — 99213 OFFICE O/P EST LOW 20 MIN: CPT | Mod: 25,,, | Performed by: NURSE PRACTITIONER

## 2022-09-15 PROCEDURE — 3074F SYST BP LT 130 MM HG: CPT | Mod: ,,, | Performed by: NURSE PRACTITIONER

## 2022-09-15 PROCEDURE — 3078F DIAST BP <80 MM HG: CPT | Mod: ,,, | Performed by: NURSE PRACTITIONER

## 2022-09-15 PROCEDURE — 3078F PR MOST RECENT DIASTOLIC BLOOD PRESSURE < 80 MM HG: ICD-10-PCS | Mod: ,,, | Performed by: NURSE PRACTITIONER

## 2022-09-15 PROCEDURE — 3008F PR BODY MASS INDEX (BMI) DOCUMENTED: ICD-10-PCS | Mod: ,,, | Performed by: NURSE PRACTITIONER

## 2022-09-15 PROCEDURE — 96372 THER/PROPH/DIAG INJ SC/IM: CPT | Mod: ,,, | Performed by: NURSE PRACTITIONER

## 2022-09-15 PROCEDURE — 3008F BODY MASS INDEX DOCD: CPT | Mod: ,,, | Performed by: NURSE PRACTITIONER

## 2022-09-15 PROCEDURE — 1159F MED LIST DOCD IN RCRD: CPT | Mod: ,,, | Performed by: NURSE PRACTITIONER

## 2022-09-15 PROCEDURE — 3074F PR MOST RECENT SYSTOLIC BLOOD PRESSURE < 130 MM HG: ICD-10-PCS | Mod: ,,, | Performed by: NURSE PRACTITIONER

## 2022-09-15 PROCEDURE — 96372 PR INJECTION,THERAP/PROPH/DIAG2ST, IM OR SUBCUT: ICD-10-PCS | Mod: ,,, | Performed by: NURSE PRACTITIONER

## 2022-09-15 RX ORDER — METHYLPREDNISOLONE ACETATE 40 MG/ML
40 INJECTION, SUSPENSION INTRA-ARTICULAR; INTRALESIONAL; INTRAMUSCULAR; SOFT TISSUE
Status: COMPLETED | OUTPATIENT
Start: 2022-09-15 | End: 2022-09-15

## 2022-09-15 RX ORDER — PROMETHAZINE HYDROCHLORIDE 6.25 MG/5ML
6.25 SYRUP ORAL EVERY 6 HOURS PRN
Qty: 150 ML | Refills: 1 | Status: SHIPPED | OUTPATIENT
Start: 2022-09-15 | End: 2022-09-25

## 2022-09-15 RX ORDER — PITAVASTATIN CALCIUM 2.09 MG/1
2 TABLET, FILM COATED ORAL DAILY
Qty: 30 TABLET | Refills: 5 | Status: SHIPPED | OUTPATIENT
Start: 2022-09-15 | End: 2023-04-03 | Stop reason: SDUPTHER

## 2022-09-15 RX ORDER — AZITHROMYCIN 1 G/1
1 POWDER, FOR SUSPENSION ORAL ONCE
Qty: 1 PACKET | Refills: 0 | Status: SHIPPED | OUTPATIENT
Start: 2022-09-15 | End: 2022-09-15

## 2022-09-15 RX ADMIN — METHYLPREDNISOLONE ACETATE 40 MG: 40 INJECTION, SUSPENSION INTRA-ARTICULAR; INTRALESIONAL; INTRAMUSCULAR; SOFT TISSUE at 02:09

## 2022-09-15 NOTE — PROGRESS NOTES
Subjective:       Patient ID: Audrey Braxton is a 67 y.o. female.    Chief Complaint: Sore Throat (Sore throat, swollen left side of neck X's 2 days)      Patient presents for left neck pain and swelling and left facial swelling near the left ear.  She also has left ear pain and sore throat.  Relief measures at home have been Singulair, loratadine without relief    Sore Throat   This is a new problem. The current episode started in the past 7 days. The problem has been gradually worsening. Neither side of throat is experiencing more pain than the other. There has been no fever. The pain is moderate. Associated symptoms include coughing, ear pain, neck pain and swollen glands. She has had no exposure to strep or mono. She has tried acetaminophen for the symptoms. The treatment provided mild relief.   Review of Systems   HENT:  Positive for ear pain and sore throat.    Respiratory:  Positive for cough.    Musculoskeletal:  Positive for neck pain.   All other systems reviewed and are negative.      Objective:      Physical Exam  Vitals and nursing note reviewed.   HENT:      Head: Normocephalic.      Ears:      Comments: Bilateral ear canals swelling and erythema     Nose: Congestion and rhinorrhea present.      Mouth/Throat:      Pharynx: Oropharyngeal exudate and posterior oropharyngeal erythema present.   Eyes:      Extraocular Movements: Extraocular movements intact.   Cardiovascular:      Rate and Rhythm: Normal rate and regular rhythm.   Pulmonary:      Effort: Pulmonary effort is normal.      Breath sounds: Normal breath sounds.   Abdominal:      General: Bowel sounds are normal.      Palpations: Abdomen is soft.   Musculoskeletal:         General: Normal range of motion.      Cervical back: Normal range of motion and neck supple.   Skin:     General: Skin is warm and dry.   Neurological:      Mental Status: She is alert and oriented to person, place, and time.       Assessment:       Problem List Items  Addressed This Visit          ENT    Acute maxillary sinusitis    Relevant Medications    methylPREDNISolone acetate injection 40 mg (Completed)       Pulmonary    Cough    Relevant Medications    promethazine (PHENERGAN) 6.25 mg/5 mL syrup       Other    Lymphadenopathy         Plan:         Acute maxillary sinusitis, lymphadenopathy neck, a cough:  Depo-Medrol 40 mg given IM by Maya Wright LPN right GM   Take all medications as ordered  Call the office with any questions or concerns

## 2022-09-21 ENCOUNTER — HISTORICAL (OUTPATIENT)
Dept: ADMINISTRATIVE | Facility: HOSPITAL | Age: 67
End: 2022-09-21
Payer: MEDICARE

## 2022-11-11 ENCOUNTER — HOSPITAL ENCOUNTER (OUTPATIENT)
Dept: RADIOLOGY | Facility: HOSPITAL | Age: 67
Discharge: HOME | End: 2022-11-11
Attending: INTERNAL MEDICINE
Payer: MEDICARE

## 2022-11-11 DIAGNOSIS — K59.1 DIARRHEA, FUNCTIONAL: ICD-10-CM

## 2022-11-11 DIAGNOSIS — S36.029A: ICD-10-CM

## 2022-11-11 DIAGNOSIS — S36.029A: Primary | ICD-10-CM

## 2022-11-11 DIAGNOSIS — R10.12 LEFT UPPER QUADRANT ABDOMINAL PAIN: ICD-10-CM

## 2022-11-11 PROCEDURE — 74160 CT ABDOMEN W/CONTRAST: CPT | Mod: TC

## 2022-11-11 PROCEDURE — 25500020 PHARM REV CODE 255: Performed by: INTERNAL MEDICINE

## 2022-11-11 RX ADMIN — IOPAMIDOL 100 ML: 755 INJECTION, SOLUTION INTRAVENOUS at 08:11

## 2022-12-19 PROBLEM — J01.00 ACUTE MAXILLARY SINUSITIS: Status: RESOLVED | Noted: 2022-09-15 | Resolved: 2022-12-19

## 2023-01-06 ENCOUNTER — LAB VISIT (OUTPATIENT)
Dept: LAB | Facility: HOSPITAL | Age: 68
End: 2023-01-06
Attending: NURSE PRACTITIONER
Payer: MEDICARE

## 2023-01-06 ENCOUNTER — CLINICAL SUPPORT (OUTPATIENT)
Dept: FAMILY MEDICINE | Facility: CLINIC | Age: 68
End: 2023-01-06
Payer: MEDICARE

## 2023-01-06 DIAGNOSIS — E78.5 HYPERLIPIDEMIA, UNSPECIFIED HYPERLIPIDEMIA TYPE: Primary | ICD-10-CM

## 2023-01-06 DIAGNOSIS — E55.9 VITAMIN D DEFICIENCY: ICD-10-CM

## 2023-01-06 DIAGNOSIS — E78.5 HYPERLIPIDEMIA, UNSPECIFIED HYPERLIPIDEMIA TYPE: ICD-10-CM

## 2023-01-06 LAB
ALBUMIN SERPL-MCNC: 4.2 G/DL (ref 3.4–4.8)
ALBUMIN/GLOB SERPL: 1.8 RATIO (ref 1.1–2)
ALP SERPL-CCNC: 84 UNIT/L (ref 40–150)
ALT SERPL-CCNC: 25 UNIT/L (ref 0–55)
AMORPH PHOS CRY URNS QL MICRO: ABNORMAL /HPF
APPEARANCE UR: ABNORMAL
AST SERPL-CCNC: 26 UNIT/L (ref 5–34)
BACTERIA #/AREA URNS AUTO: ABNORMAL /HPF
BASOPHILS # BLD AUTO: 0.04 X10(3)/MCL (ref 0–0.2)
BASOPHILS NFR BLD AUTO: 0.8 %
BILIRUB UR QL STRIP.AUTO: NEGATIVE MG/DL
BILIRUBIN DIRECT+TOT PNL SERPL-MCNC: 0.5 MG/DL
BUN SERPL-MCNC: 25 MG/DL (ref 9.8–20.1)
CALCIUM SERPL-MCNC: 9.4 MG/DL (ref 8.4–10.2)
CAOX CRY URNS QL MICRO: ABNORMAL /HPF
CHLORIDE SERPL-SCNC: 107 MMOL/L (ref 98–107)
CHOLEST SERPL-MCNC: 199 MG/DL
CHOLEST/HDLC SERPL: 3 {RATIO} (ref 0–5)
CO2 SERPL-SCNC: 28 MMOL/L (ref 23–31)
COLOR UR AUTO: YELLOW
CREAT SERPL-MCNC: 0.79 MG/DL (ref 0.55–1.02)
EOSINOPHIL # BLD AUTO: 0.27 X10(3)/MCL (ref 0–0.9)
EOSINOPHIL NFR BLD AUTO: 5.4 %
ERYTHROCYTE [DISTWIDTH] IN BLOOD BY AUTOMATED COUNT: 13.2 % (ref 11–14.5)
GFR SERPLBLD CREATININE-BSD FMLA CKD-EPI: 83 MLS/MIN/1.73/M2
GLOBULIN SER-MCNC: 2.3 GM/DL (ref 2.4–3.5)
GLUCOSE SERPL-MCNC: 84 MG/DL (ref 82–115)
GLUCOSE UR QL STRIP.AUTO: NEGATIVE MG/DL
HCT VFR BLD AUTO: 43.5 % (ref 37–47)
HDLC SERPL-MCNC: 69 MG/DL (ref 35–60)
HGB BLD-MCNC: 14.3 GM/DL (ref 12–16)
IMM GRANULOCYTES # BLD AUTO: 0 X10(3)/MCL (ref 0–0.04)
IMM GRANULOCYTES NFR BLD AUTO: 0 %
KETONES UR QL STRIP.AUTO: NEGATIVE MG/DL
LDLC SERPL CALC-MCNC: 97 MG/DL (ref 50–140)
LEUKOCYTE ESTERASE UR QL STRIP.AUTO: NEGATIVE UNIT/L
LYMPHOCYTES # BLD AUTO: 1.93 X10(3)/MCL (ref 0.6–4.6)
LYMPHOCYTES NFR BLD AUTO: 38.6 %
MCH RBC QN AUTO: 32.7 PG
MCHC RBC AUTO-ENTMCNC: 32.9 MG/DL (ref 33–36)
MCV RBC AUTO: 99.5 FL (ref 80–94)
MONOCYTES # BLD AUTO: 0.29 X10(3)/MCL (ref 0.1–1.3)
MONOCYTES NFR BLD AUTO: 5.8 %
NEUTROPHILS # BLD AUTO: 2.47 X10(3)/MCL (ref 2.1–9.2)
NEUTROPHILS NFR BLD AUTO: 49.4 %
NITRITE UR QL STRIP.AUTO: NEGATIVE
NRBC BLD AUTO-RTO: 0 % (ref 0–1)
PH UR STRIP.AUTO: 8 [PH]
PLATELET # BLD AUTO: 241 X10(3)/MCL (ref 140–371)
PMV BLD AUTO: 11.1 FL (ref 9.4–12.4)
POTASSIUM SERPL-SCNC: 4.5 MMOL/L (ref 3.5–5.1)
PROT SERPL-MCNC: 6.5 GM/DL (ref 5.8–7.6)
PROT UR QL STRIP.AUTO: NEGATIVE MG/DL
RBC # BLD AUTO: 4.37 X10(6)/MCL (ref 4.2–5.4)
RBC #/AREA URNS AUTO: ABNORMAL /HPF
RBC UR QL AUTO: NEGATIVE UNIT/L
SODIUM SERPL-SCNC: 142 MMOL/L (ref 136–145)
SP GR UR STRIP.AUTO: 1.01
SQUAMOUS #/AREA URNS AUTO: ABNORMAL /HPF
TRIGL SERPL-MCNC: 167 MG/DL (ref 37–140)
TSH SERPL-ACNC: 1.72 UIU/ML (ref 0.35–4.94)
UROBILINOGEN UR STRIP-ACNC: 2 MG/DL
VLDLC SERPL CALC-MCNC: 33 MG/DL
WBC # SPEC AUTO: 5 X10(3)/MCL (ref 4.5–11.5)
WBC #/AREA URNS AUTO: ABNORMAL /HPF

## 2023-01-06 PROCEDURE — 85025 COMPLETE CBC W/AUTO DIFF WBC: CPT

## 2023-01-06 PROCEDURE — 36415 COLL VENOUS BLD VENIPUNCTURE: CPT

## 2023-01-06 PROCEDURE — 80053 COMPREHEN METABOLIC PANEL: CPT

## 2023-01-06 PROCEDURE — 84443 ASSAY THYROID STIM HORMONE: CPT

## 2023-01-06 PROCEDURE — 82306 VITAMIN D 25 HYDROXY: CPT

## 2023-01-06 PROCEDURE — 80061 LIPID PANEL: CPT

## 2023-01-07 LAB — DEPRECATED CALCIDIOL+CALCIFEROL SERPL-MC: 64.4 NG/ML (ref 30–80)

## 2023-01-10 ENCOUNTER — OFFICE VISIT (OUTPATIENT)
Dept: FAMILY MEDICINE | Facility: CLINIC | Age: 68
End: 2023-01-10
Payer: MEDICARE

## 2023-01-10 VITALS
DIASTOLIC BLOOD PRESSURE: 76 MMHG | SYSTOLIC BLOOD PRESSURE: 114 MMHG | HEIGHT: 64 IN | WEIGHT: 143 LBS | HEART RATE: 76 BPM | BODY MASS INDEX: 24.41 KG/M2

## 2023-01-10 DIAGNOSIS — E78.5 HYPERLIPIDEMIA, UNSPECIFIED HYPERLIPIDEMIA TYPE: ICD-10-CM

## 2023-01-10 DIAGNOSIS — Z78.0 POST-MENOPAUSAL: ICD-10-CM

## 2023-01-10 DIAGNOSIS — F32.A DEPRESSION, UNSPECIFIED DEPRESSION TYPE: ICD-10-CM

## 2023-01-10 PROCEDURE — 3078F PR MOST RECENT DIASTOLIC BLOOD PRESSURE < 80 MM HG: ICD-10-PCS | Mod: ,,, | Performed by: NURSE PRACTITIONER

## 2023-01-10 PROCEDURE — 3074F SYST BP LT 130 MM HG: CPT | Mod: ,,, | Performed by: NURSE PRACTITIONER

## 2023-01-10 PROCEDURE — 99213 PR OFFICE/OUTPT VISIT, EST, LEVL III, 20-29 MIN: ICD-10-PCS | Mod: ,,, | Performed by: NURSE PRACTITIONER

## 2023-01-10 PROCEDURE — 1159F MED LIST DOCD IN RCRD: CPT | Mod: ,,, | Performed by: NURSE PRACTITIONER

## 2023-01-10 PROCEDURE — 3074F PR MOST RECENT SYSTOLIC BLOOD PRESSURE < 130 MM HG: ICD-10-PCS | Mod: ,,, | Performed by: NURSE PRACTITIONER

## 2023-01-10 PROCEDURE — 1159F PR MEDICATION LIST DOCUMENTED IN MEDICAL RECORD: ICD-10-PCS | Mod: ,,, | Performed by: NURSE PRACTITIONER

## 2023-01-10 PROCEDURE — 99213 OFFICE O/P EST LOW 20 MIN: CPT | Mod: ,,, | Performed by: NURSE PRACTITIONER

## 2023-01-10 PROCEDURE — 3008F PR BODY MASS INDEX (BMI) DOCUMENTED: ICD-10-PCS | Mod: ,,, | Performed by: NURSE PRACTITIONER

## 2023-01-10 PROCEDURE — 3078F DIAST BP <80 MM HG: CPT | Mod: ,,, | Performed by: NURSE PRACTITIONER

## 2023-01-10 PROCEDURE — 3008F BODY MASS INDEX DOCD: CPT | Mod: ,,, | Performed by: NURSE PRACTITIONER

## 2023-01-10 RX ORDER — MONTELUKAST SODIUM 4 MG/1
1 TABLET, CHEWABLE ORAL 2 TIMES DAILY
COMMUNITY
Start: 2022-10-27

## 2023-01-10 RX ORDER — ASPIRIN 325 MG
50 TABLET, DELAYED RELEASE (ENTERIC COATED) ORAL DAILY
COMMUNITY

## 2023-01-10 NOTE — PROGRESS NOTES
Subjective:       Patient ID: Audrey Braxton is a 67 y.o. female.    Chief Complaint: Follow-up (Patient here for 6 month follow up with no complaints. )      The patient is a 67-year-old female who presents for a checkup.  This patient has had Clostridium difficile in the past.  She also has a history of general GI upset.  And she would to a gastroenterologist who told her that she has a hematoma on her spleen.  The CT scan shows everything normal including the spleen.  She says she changed her diet to gluten free and lactose-free and that she is feeling much better.    She also has a history of hyperlipidemia, depression and she is postmenopausal.  She tells me she is taking her statin medication, her antidepressant, and she is taking calcium b.i.d. along with vitamin-D.    Review of Systems   Constitutional: Negative.    HENT: Negative.     Eyes: Negative.    Respiratory: Negative.     Cardiovascular: Negative.    Gastrointestinal: Negative.    Endocrine: Negative.    Genitourinary: Negative.    Musculoskeletal: Negative.    Integumentary:  Negative.   Neurological: Negative.    Hematological: Negative.        Objective:      Physical Exam  HENT:      Head: Normocephalic.      Right Ear: Tympanic membrane normal.      Left Ear: Tympanic membrane normal.      Nose: Nose normal.      Mouth/Throat:      Mouth: Mucous membranes are moist.   Eyes:      Pupils: Pupils are equal, round, and reactive to light.   Cardiovascular:      Rate and Rhythm: Normal rate and regular rhythm.      Heart sounds: No murmur heard.  Pulmonary:      Effort: Pulmonary effort is normal.      Breath sounds: Normal breath sounds.   Abdominal:      General: Bowel sounds are normal.      Palpations: Abdomen is soft.   Musculoskeletal:         General: Normal range of motion.      Cervical back: Normal range of motion.   Skin:     General: Skin is warm.   Neurological:      Mental Status: She is alert and oriented to person, place, and time.        Assessment:       Problem List Items Addressed This Visit          Psychiatric    Depression       Cardiac/Vascular    Hyperlipidemia       Renal/    Post-menopausal       Plan:     Depression:  Controlled   Continue current prescribed medication    Hyperlipidemia  - tolerating medication  - no myalgia  - watching diet    Postmenopausal:  Continue calcium    Call the office with any questions or concerns

## 2023-04-03 ENCOUNTER — OFFICE VISIT (OUTPATIENT)
Dept: FAMILY MEDICINE | Facility: CLINIC | Age: 68
End: 2023-04-03
Payer: MEDICARE

## 2023-04-03 VITALS
TEMPERATURE: 97 F | HEIGHT: 64 IN | SYSTOLIC BLOOD PRESSURE: 121 MMHG | WEIGHT: 140 LBS | BODY MASS INDEX: 23.9 KG/M2 | HEART RATE: 64 BPM | DIASTOLIC BLOOD PRESSURE: 73 MMHG | RESPIRATION RATE: 20 BRPM

## 2023-04-03 DIAGNOSIS — M79.671 RIGHT FOOT PAIN: ICD-10-CM

## 2023-04-03 DIAGNOSIS — M19.90 ARTHRITIS: ICD-10-CM

## 2023-04-03 DIAGNOSIS — E78.5 HYPERLIPIDEMIA, UNSPECIFIED HYPERLIPIDEMIA TYPE: ICD-10-CM

## 2023-04-03 DIAGNOSIS — M79.2 PERIPHERAL NEUROPATHIC PAIN: ICD-10-CM

## 2023-04-03 PROCEDURE — 1159F MED LIST DOCD IN RCRD: CPT | Mod: ,,, | Performed by: NURSE PRACTITIONER

## 2023-04-03 PROCEDURE — 3074F PR MOST RECENT SYSTOLIC BLOOD PRESSURE < 130 MM HG: ICD-10-PCS | Mod: ,,, | Performed by: NURSE PRACTITIONER

## 2023-04-03 PROCEDURE — 3078F DIAST BP <80 MM HG: CPT | Mod: ,,, | Performed by: NURSE PRACTITIONER

## 2023-04-03 PROCEDURE — 3078F PR MOST RECENT DIASTOLIC BLOOD PRESSURE < 80 MM HG: ICD-10-PCS | Mod: ,,, | Performed by: NURSE PRACTITIONER

## 2023-04-03 PROCEDURE — 3008F PR BODY MASS INDEX (BMI) DOCUMENTED: ICD-10-PCS | Mod: ,,, | Performed by: NURSE PRACTITIONER

## 2023-04-03 PROCEDURE — 3074F SYST BP LT 130 MM HG: CPT | Mod: ,,, | Performed by: NURSE PRACTITIONER

## 2023-04-03 PROCEDURE — 99213 PR OFFICE/OUTPT VISIT, EST, LEVL III, 20-29 MIN: ICD-10-PCS | Mod: ,,, | Performed by: NURSE PRACTITIONER

## 2023-04-03 PROCEDURE — 3008F BODY MASS INDEX DOCD: CPT | Mod: ,,, | Performed by: NURSE PRACTITIONER

## 2023-04-03 PROCEDURE — 99213 OFFICE O/P EST LOW 20 MIN: CPT | Mod: ,,, | Performed by: NURSE PRACTITIONER

## 2023-04-03 PROCEDURE — 1159F PR MEDICATION LIST DOCUMENTED IN MEDICAL RECORD: ICD-10-PCS | Mod: ,,, | Performed by: NURSE PRACTITIONER

## 2023-04-03 RX ORDER — GABAPENTIN 300 MG/1
300 CAPSULE ORAL NIGHTLY
Qty: 30 CAPSULE | Refills: 6 | Status: SHIPPED | OUTPATIENT
Start: 2023-04-03 | End: 2024-04-02

## 2023-04-03 RX ORDER — DEXTROMETHORPHAN HYDROBROMIDE, GUAIFENESIN 5; 100 MG/5ML; MG/5ML
650 LIQUID ORAL DAILY
COMMUNITY

## 2023-04-03 RX ORDER — PITAVASTATIN CALCIUM 2.09 MG/1
2 TABLET, FILM COATED ORAL DAILY
Qty: 30 TABLET | Refills: 5 | Status: SHIPPED | OUTPATIENT
Start: 2023-04-03 | End: 2023-11-02 | Stop reason: SDUPTHER

## 2023-04-03 RX ORDER — NAPROXEN 375 MG/1
375 TABLET ORAL 2 TIMES DAILY WITH MEALS
Qty: 14 TABLET | Refills: 0 | Status: SHIPPED | OUTPATIENT
Start: 2023-04-03 | End: 2023-04-10

## 2023-04-03 NOTE — PROGRESS NOTES
"Subjective:       Patient ID: Audrey Braxton is a 68 y.o. female.    Chief Complaint: Foot Pain (Right foot pain X's 1 month that is getting worse the last week)    The patient presents with foot pain.  The patient describes the pain as pins and needles in her foot.  Patient said she has been both feet but is worse in her right foot.  The patient was an alcohol drinker but tells me that she quit "a long time ago".  I am not sure how much she drank more help home see drink.  However I did inform her that drinking alcohol can lead to peripheral neuropathy.      Review of Systems 12 point review of systems conducted, negative except as stated in the history of present illness. See HPI for details.        Objective:        Visit Vitals  /73   Pulse 64   Temp 97.2 °F (36.2 °C) (Temporal)   Resp 20   Ht 5' 4" (1.626 m)   Wt 63.5 kg (140 lb)   BMI 24.03 kg/m²        Physical Exam  Vitals and nursing note reviewed.   Constitutional:       Appearance: She is obese.   HENT:      Head: Normocephalic and atraumatic.      Right Ear: Tympanic membrane normal.      Left Ear: Tympanic membrane normal.      Nose: Nose normal.      Mouth/Throat:      Mouth: Mucous membranes are moist.   Eyes:      Extraocular Movements: Extraocular movements intact.   Cardiovascular:      Rate and Rhythm: Normal rate and regular rhythm.      Heart sounds: No murmur heard.  Pulmonary:      Effort: Pulmonary effort is normal.      Breath sounds: Normal breath sounds.   Abdominal:      General: Bowel sounds are normal.      Palpations: Abdomen is soft.   Musculoskeletal:         General: Normal range of motion.      Cervical back: Normal range of motion and neck supple.      Comments: Difficulty ambulating due to pins and needles feelings in both feet   Skin:     General: Skin is warm and dry.   Neurological:      Mental Status: She is alert and oriented to person, place, and time.         Assessment:           ICD-10-CM ICD-9-CM   1. " Hyperlipidemia, unspecified hyperlipidemia type  E78.5 272.4   2. Arthritis  M19.90 716.90   3. Right foot pain  M79.671 729.5   4. Peripheral neuropathic pain  M79.2 729.2            Plan:         1. Hyperlipidemia, unspecified hyperlipidemia type  Hyperlipidemia  - tolerating medication  - no myalgia  - watching diet  - LIVALO 2 mg Tab tablet; Take 1 tablet (2 mg total) by mouth once daily.  Dispense: 30 tablet; Refill: 5  - naproxen (EC-NAPROSYN) 375 MG TbEC EC tablet; Take 1 tablet (375 mg total) by mouth 2 (two) times daily with meals. for 7 days  Dispense: 14 tablet; Refill: 0    2. Arthritis  Patient started on gabapentin due to neuropathy  - gabapentin (NEURONTIN) 300 MG capsule; Take 1 capsule (300 mg total) by mouth every evening.  Dispense: 30 capsule; Refill: 6    3. Right foot pain  - gabapentin (NEURONTIN) 300 MG capsule; Take 1 capsule (300 mg total) by mouth every evening.  Dispense: 30 capsule; Refill: 6    4. Peripheral neuropathic pain  Gabapentin 300 mg capsules take 1 by mouth every evening        Call office with any questions or concerns  Future Appointments   Date Time Provider Department Center   7/12/2023  8:00 AM NURSE, [facility]   7/19/2023  1:30 PM Kailey Russell NP [facility]        Past Medical History:   Diagnosis Date    Allergy     Anxiety     Cataract     Depression     Hyperlipidemia         Review of patient's allergies indicates:   Allergen Reactions    Amoxicillin-pot clavulanate Diarrhea        Current Outpatient Medications   Medication Instructions    acetaminophen (TYLENOL ARTHRITIS PAIN) 650 mg, Oral, Daily    ATIVAN 2 mg Tab 2.5 tablets, Oral, Nightly    calcium carbonate-vitamin D3 600 mg-20 mcg (800 unit) Chew 1 tablet, Oral, 2 times daily    cholecalciferol (vitamin D3) 5,000 Units, Oral, Daily    co-enzyme Q-10 50 mg, Oral, Daily    colestipoL (COLESTID) 1 g, Oral, 2 times daily    gabapentin (NEURONTIN) 300 mg, Oral, Nightly     LIVALO 2 mg, Oral, Daily    loratadine (CLARITIN) 10 mg tablet 1 tablet, Oral, Daily    montelukast (SINGULAIR) 10 mg, Oral, Daily    naproxen (EC-NAPROSYN) 375 mg, Oral, 2 times daily with meals    ZOLOFT 75 mg, Oral, Every morning          Patient Active Problem List   Diagnosis    Rib pain on left side    Post-menopausal    Hyperlipidemia    Anxiety    Left upper quadrant abdominal pain    Acute cystitis without hematuria    Diarrhea    Cough    Lymphadenopathy    Depression    Peripheral neuropathic pain    Arthritis    Right foot pain             Past Medical History:   Diagnosis Date    Allergy     Anxiety     Cataract     Depression     Hyperlipidemia           Past Surgical History:   Procedure Laterality Date    CATARACT EXTRACTION      CHOLECYSTECTOMY      EYE SURGERY             reports that she has been smoking cigarettes. She has been smoking an average of .5 packs per day. She has never used smokeless tobacco. She reports current alcohol use of about 1.0 standard drink per week. She reports that she does not use drugs.      There is no immunization history on file for this patient.     Health Maintenance   Topic Date Due    Hepatitis C Screening  Never done    TETANUS VACCINE  Never done    Mammogram  06/30/2023    DEXA Scan  06/30/2025    Lipid Panel  01/06/2028

## 2023-04-04 PROBLEM — M79.671 RIGHT FOOT PAIN: Status: ACTIVE | Noted: 2023-04-04

## 2023-04-04 PROBLEM — M79.2 PERIPHERAL NEUROPATHIC PAIN: Status: ACTIVE | Noted: 2023-04-04

## 2023-04-04 PROBLEM — M19.90 ARTHRITIS: Status: ACTIVE | Noted: 2023-04-04

## 2023-06-06 ENCOUNTER — OFFICE VISIT (OUTPATIENT)
Dept: FAMILY MEDICINE | Facility: CLINIC | Age: 68
End: 2023-06-06
Payer: MEDICARE

## 2023-06-06 VITALS
HEIGHT: 64 IN | WEIGHT: 140 LBS | DIASTOLIC BLOOD PRESSURE: 63 MMHG | SYSTOLIC BLOOD PRESSURE: 104 MMHG | RESPIRATION RATE: 20 BRPM | TEMPERATURE: 97 F | HEART RATE: 56 BPM | BODY MASS INDEX: 23.9 KG/M2

## 2023-06-06 DIAGNOSIS — M54.2 NECK PAIN: Primary | ICD-10-CM

## 2023-06-06 PROCEDURE — 1159F PR MEDICATION LIST DOCUMENTED IN MEDICAL RECORD: ICD-10-PCS | Mod: ,,, | Performed by: NURSE PRACTITIONER

## 2023-06-06 PROCEDURE — 3078F DIAST BP <80 MM HG: CPT | Mod: ,,, | Performed by: NURSE PRACTITIONER

## 2023-06-06 PROCEDURE — 3078F PR MOST RECENT DIASTOLIC BLOOD PRESSURE < 80 MM HG: ICD-10-PCS | Mod: ,,, | Performed by: NURSE PRACTITIONER

## 2023-06-06 PROCEDURE — 3074F PR MOST RECENT SYSTOLIC BLOOD PRESSURE < 130 MM HG: ICD-10-PCS | Mod: ,,, | Performed by: NURSE PRACTITIONER

## 2023-06-06 PROCEDURE — 99213 PR OFFICE/OUTPT VISIT, EST, LEVL III, 20-29 MIN: ICD-10-PCS | Mod: ,,, | Performed by: NURSE PRACTITIONER

## 2023-06-06 PROCEDURE — 99213 OFFICE O/P EST LOW 20 MIN: CPT | Mod: ,,, | Performed by: NURSE PRACTITIONER

## 2023-06-06 PROCEDURE — 1159F MED LIST DOCD IN RCRD: CPT | Mod: ,,, | Performed by: NURSE PRACTITIONER

## 2023-06-06 PROCEDURE — 3074F SYST BP LT 130 MM HG: CPT | Mod: ,,, | Performed by: NURSE PRACTITIONER

## 2023-06-06 PROCEDURE — 3008F PR BODY MASS INDEX (BMI) DOCUMENTED: ICD-10-PCS | Mod: ,,, | Performed by: NURSE PRACTITIONER

## 2023-06-06 PROCEDURE — 3008F BODY MASS INDEX DOCD: CPT | Mod: ,,, | Performed by: NURSE PRACTITIONER

## 2023-06-06 RX ORDER — DICLOFENAC SODIUM 75 MG/1
75 TABLET, DELAYED RELEASE ORAL 2 TIMES DAILY
Qty: 28 TABLET | Refills: 0 | Status: SHIPPED | OUTPATIENT
Start: 2023-06-06 | End: 2023-06-20

## 2023-06-06 RX ORDER — CYCLOBENZAPRINE HCL 5 MG
5 TABLET ORAL 3 TIMES DAILY PRN
Qty: 30 TABLET | Refills: 1 | Status: SHIPPED | OUTPATIENT
Start: 2023-06-06 | End: 2023-07-19

## 2023-06-06 NOTE — PROGRESS NOTES
"Subjective:       Patient ID: Audrey Braxton is a 68 y.o. female.    Chief Complaint: Neck Pain (Neck pain getting worse, would like MRI)        The patient is a 68-year-old female who presents with neck pain.  Relief measures at home have been Tylenol and ice mild results.  Neck pain for greater than 3 months. The patient states she feels like she has of lump in there which makes it hard for her to swallow.     Neck Pain   This is a chronic problem. The current episode started more than 1 month ago. The problem occurs constantly. The problem has been gradually worsening. Associated with: unknown. The pain is present in the left side, right side and occipital region. The quality of the pain is described as aching. The pain is at a severity of 10/10. The pain is severe. The symptoms are aggravated by position and twisting (rotation). Stiffness is present At night. Associated symptoms include trouble swallowing. She has tried acetaminophen, NSAIDs and ice for the symptoms. The treatment provided moderate relief.   Review of Systems   HENT:  Positive for trouble swallowing.    Musculoskeletal:  Positive for neck pain. 12 point review of systems conducted, negative except as stated in the history of present illness. See HPI for details.      Objective:      Visit Vitals  /63   Pulse (!) 56   Temp 97.2 °F (36.2 °C) (Temporal)   Resp 20   Ht 5' 4" (1.626 m)   Wt 63.5 kg (140 lb)   BMI 24.03 kg/m²     Physical Exam  Vitals and nursing note reviewed.   HENT:      Head: Normocephalic.      Right Ear: Tympanic membrane normal.      Left Ear: Tympanic membrane normal.      Nose: Nose normal.      Mouth/Throat:      Mouth: Mucous membranes are moist.   Eyes:      Extraocular Movements: Extraocular movements intact.   Cardiovascular:      Rate and Rhythm: Normal rate and regular rhythm.      Heart sounds: No murmur heard.  Pulmonary:      Effort: Pulmonary effort is normal.      Breath sounds: Normal breath sounds. "   Abdominal:      General: Bowel sounds are normal.      Palpations: Abdomen is soft.   Musculoskeletal:         General: Normal range of motion.      Cervical back: Tenderness present.   Skin:     General: Skin is warm and dry.   Neurological:      Mental Status: She is alert and oriented to person, place, and time.     Current Outpatient Medications   Medication Instructions    acetaminophen (TYLENOL) 650 mg, Oral, Daily    ATIVAN 2 mg Tab 2.5 tablets, Oral, Nightly    calcium carbonate-vitamin D3 600 mg-20 mcg (800 unit) Chew 1 tablet, Oral, 2 times daily    cholecalciferol (vitamin D3) 5,000 Units, Oral, Daily    co-enzyme Q-10 50 mg, Oral, Daily    colestipoL (COLESTID) 1 g, Oral, 2 times daily    cyclobenzaprine (FLEXERIL) 5 mg, Oral, 3 times daily PRN    diclofenac (VOLTAREN) 75 mg, Oral, 2 times daily    gabapentin (NEURONTIN) 300 mg, Oral, Nightly    LIVALO 2 mg, Oral, Daily    loratadine (CLARITIN) 10 mg tablet 1 tablet, Oral, Daily    montelukast (SINGULAIR) 10 mg, Oral, Daily    ZOLOFT 75 mg, Oral, Every morning     is allergic to amoxicillin-pot clavulanate.       Assessment:         ICD-10-CM ICD-9-CM   1. Neck pain  M54.2 723.1          Plan:       1. Neck pain  - diclofenac (VOLTAREN) 75 MG EC tablet; Take 1 tablet (75 mg total) by mouth 2 (two) times daily. for 14 days  Dispense: 28 tablet; Refill: 0  - X-Ray Cervical Spine AP And Lateral; Future  - cyclobenzaprine (FLEXERIL) 5 MG tablet; Take 1 tablet (5 mg total) by mouth 3 (three) times daily as needed for Muscle spasms.  Dispense: 30 tablet; Refill: 1      Follow up if symptoms worsen or fail to improve.     Future Appointments   Date Time Provider Department Center   7/12/2023  8:00 AM NURSE, [facility]   7/19/2023  1:30 PM Kailey Russell NP [facility]

## 2023-06-07 ENCOUNTER — HOSPITAL ENCOUNTER (OUTPATIENT)
Dept: RADIOLOGY | Facility: HOSPITAL | Age: 68
Discharge: HOME | End: 2023-06-07
Attending: NURSE PRACTITIONER
Payer: MEDICARE

## 2023-06-07 DIAGNOSIS — M54.2 NECK PAIN: ICD-10-CM

## 2023-06-07 PROCEDURE — 72040 X-RAY EXAM NECK SPINE 2-3 VW: CPT | Mod: TC

## 2023-06-13 ENCOUNTER — TELEPHONE (OUTPATIENT)
Dept: FAMILY MEDICINE | Facility: CLINIC | Age: 68
End: 2023-06-13
Payer: MEDICARE

## 2023-06-13 NOTE — TELEPHONE ENCOUNTER
" I tried to call the patient I got a recording that said "the   Number you have dialed is  not a working number".  "

## 2023-06-28 DIAGNOSIS — M54.2 NECK PAIN: Primary | ICD-10-CM

## 2023-06-28 NOTE — PROGRESS NOTES
I spoke with the patient over the phone let her know that she has some mild anterolisthesis of C3 and C4 and she has some degenerative changes and straightening of the normal curvature.  She tells me she is in pain all the time issue is getting worse.  She wants to be referred to Neurosurgery.  So I sent in a referral to Neurosurgery.

## 2023-07-11 DIAGNOSIS — Z00.00 WELLNESS EXAMINATION: Primary | ICD-10-CM

## 2023-07-11 DIAGNOSIS — E78.5 HYPERLIPIDEMIA, UNSPECIFIED HYPERLIPIDEMIA TYPE: ICD-10-CM

## 2023-07-12 ENCOUNTER — CLINICAL SUPPORT (OUTPATIENT)
Dept: FAMILY MEDICINE | Facility: CLINIC | Age: 68
End: 2023-07-12
Payer: MEDICARE

## 2023-07-12 DIAGNOSIS — Z00.00 WELLNESS EXAMINATION: ICD-10-CM

## 2023-07-12 DIAGNOSIS — E78.5 HYPERLIPIDEMIA, UNSPECIFIED HYPERLIPIDEMIA TYPE: ICD-10-CM

## 2023-07-12 LAB
ALBUMIN SERPL-MCNC: 3.8 G/DL (ref 3.4–4.8)
ALBUMIN/GLOB SERPL: 1.5 RATIO (ref 1.1–2)
ALP SERPL-CCNC: 73 UNIT/L (ref 40–150)
ALT SERPL-CCNC: 30 UNIT/L (ref 0–55)
APPEARANCE UR: CLEAR
AST SERPL-CCNC: 26 UNIT/L (ref 5–34)
BACTERIA #/AREA URNS AUTO: ABNORMAL /HPF
BASOPHILS # BLD AUTO: 0.04 X10(3)/MCL
BASOPHILS NFR BLD AUTO: 0.9 %
BILIRUB UR QL STRIP.AUTO: NEGATIVE
BILIRUBIN DIRECT+TOT PNL SERPL-MCNC: 0.6 MG/DL
BUN SERPL-MCNC: 11 MG/DL (ref 9.8–20.1)
CALCIUM SERPL-MCNC: 9.6 MG/DL (ref 8.4–10.2)
CAOX CRY URNS QL MICRO: ABNORMAL /HPF
CHLORIDE SERPL-SCNC: 109 MMOL/L (ref 98–107)
CHOLEST SERPL-MCNC: 187 MG/DL
CHOLEST/HDLC SERPL: 3 {RATIO} (ref 0–5)
CO2 SERPL-SCNC: 28 MMOL/L (ref 23–31)
COLOR UR: YELLOW
CREAT SERPL-MCNC: 0.77 MG/DL (ref 0.55–1.02)
EOSINOPHIL # BLD AUTO: 0.28 X10(3)/MCL (ref 0–0.9)
EOSINOPHIL NFR BLD AUTO: 6.1 %
ERYTHROCYTE [DISTWIDTH] IN BLOOD BY AUTOMATED COUNT: 12.7 % (ref 11.5–17)
GFR SERPLBLD CREATININE-BSD FMLA CKD-EPI: >60 MLS/MIN/1.73/M2
GLOBULIN SER-MCNC: 2.5 GM/DL (ref 2.4–3.5)
GLUCOSE SERPL-MCNC: 84 MG/DL (ref 82–115)
GLUCOSE UR QL STRIP.AUTO: NEGATIVE
HCT VFR BLD AUTO: 42 % (ref 37–47)
HDLC SERPL-MCNC: 74 MG/DL (ref 35–60)
HGB BLD-MCNC: 13.6 G/DL (ref 12–16)
IMM GRANULOCYTES # BLD AUTO: 0.01 X10(3)/MCL (ref 0–0.04)
IMM GRANULOCYTES NFR BLD AUTO: 0.2 %
KETONES UR QL STRIP.AUTO: NEGATIVE
LDLC SERPL CALC-MCNC: 101 MG/DL (ref 50–140)
LEUKOCYTE ESTERASE UR QL STRIP.AUTO: NEGATIVE
LYMPHOCYTES # BLD AUTO: 1.94 X10(3)/MCL (ref 0.6–4.6)
LYMPHOCYTES NFR BLD AUTO: 42.5 %
MCH RBC QN AUTO: 32.5 PG (ref 27–31)
MCHC RBC AUTO-ENTMCNC: 32.4 G/DL (ref 33–36)
MCV RBC AUTO: 100.5 FL (ref 80–94)
MONOCYTES # BLD AUTO: 0.3 X10(3)/MCL (ref 0.1–1.3)
MONOCYTES NFR BLD AUTO: 6.6 %
MUCOUS THREADS URNS QL MICRO: ABNORMAL /LPF
NEUTROPHILS # BLD AUTO: 1.99 X10(3)/MCL (ref 2.1–9.2)
NEUTROPHILS NFR BLD AUTO: 43.7 %
NITRITE UR QL STRIP.AUTO: NEGATIVE
NRBC BLD AUTO-RTO: 0 %
PH UR STRIP.AUTO: 6 [PH]
PLATELET # BLD AUTO: 214 X10(3)/MCL (ref 130–400)
PMV BLD AUTO: 11.2 FL (ref 7.4–10.4)
POTASSIUM SERPL-SCNC: 4.7 MMOL/L (ref 3.5–5.1)
PROT SERPL-MCNC: 6.3 GM/DL (ref 5.8–7.6)
PROT UR QL STRIP.AUTO: NEGATIVE
RBC # BLD AUTO: 4.18 X10(6)/MCL (ref 4.2–5.4)
RBC #/AREA URNS AUTO: ABNORMAL /HPF
RBC UR QL AUTO: ABNORMAL
SODIUM SERPL-SCNC: 142 MMOL/L (ref 136–145)
SP GR UR STRIP.AUTO: 1.02
SQUAMOUS #/AREA URNS AUTO: ABNORMAL /HPF
TRIGL SERPL-MCNC: 61 MG/DL (ref 37–140)
TSH SERPL-ACNC: 2.37 UIU/ML (ref 0.35–4.94)
UROBILINOGEN UR STRIP-ACNC: 0.2
VLDLC SERPL CALC-MCNC: 12 MG/DL
WBC # SPEC AUTO: 4.56 X10(3)/MCL (ref 4.5–11.5)
WBC #/AREA URNS AUTO: ABNORMAL /HPF

## 2023-07-12 PROCEDURE — 86803 HEPATITIS C AB TEST: CPT | Performed by: NURSE PRACTITIONER

## 2023-07-12 PROCEDURE — 84443 ASSAY THYROID STIM HORMONE: CPT | Performed by: NURSE PRACTITIONER

## 2023-07-12 PROCEDURE — 36415 COLL VENOUS BLD VENIPUNCTURE: CPT

## 2023-07-12 PROCEDURE — 85025 COMPLETE CBC W/AUTO DIFF WBC: CPT | Performed by: NURSE PRACTITIONER

## 2023-07-12 PROCEDURE — 80061 LIPID PANEL: CPT | Performed by: NURSE PRACTITIONER

## 2023-07-12 PROCEDURE — 81001 URINALYSIS AUTO W/SCOPE: CPT | Performed by: NURSE PRACTITIONER

## 2023-07-12 PROCEDURE — 80053 COMPREHEN METABOLIC PANEL: CPT | Performed by: NURSE PRACTITIONER

## 2023-07-13 LAB — HCV AB SERPL QL IA: NONREACTIVE

## 2023-07-19 ENCOUNTER — OFFICE VISIT (OUTPATIENT)
Dept: FAMILY MEDICINE | Facility: CLINIC | Age: 68
End: 2023-07-19
Payer: MEDICARE

## 2023-07-19 VITALS
HEART RATE: 65 BPM | DIASTOLIC BLOOD PRESSURE: 76 MMHG | WEIGHT: 144 LBS | BODY MASS INDEX: 24.59 KG/M2 | HEIGHT: 64 IN | SYSTOLIC BLOOD PRESSURE: 117 MMHG

## 2023-07-19 DIAGNOSIS — M54.2 NECK PAIN: ICD-10-CM

## 2023-07-19 DIAGNOSIS — M54.2 CERVICALGIA: ICD-10-CM

## 2023-07-19 PROCEDURE — 1159F MED LIST DOCD IN RCRD: CPT | Mod: ,,, | Performed by: NURSE PRACTITIONER

## 2023-07-19 PROCEDURE — 3078F DIAST BP <80 MM HG: CPT | Mod: ,,, | Performed by: NURSE PRACTITIONER

## 2023-07-19 PROCEDURE — 99213 OFFICE O/P EST LOW 20 MIN: CPT | Mod: ,,, | Performed by: NURSE PRACTITIONER

## 2023-07-19 PROCEDURE — 3008F PR BODY MASS INDEX (BMI) DOCUMENTED: ICD-10-PCS | Mod: ,,, | Performed by: NURSE PRACTITIONER

## 2023-07-19 PROCEDURE — 3078F PR MOST RECENT DIASTOLIC BLOOD PRESSURE < 80 MM HG: ICD-10-PCS | Mod: ,,, | Performed by: NURSE PRACTITIONER

## 2023-07-19 PROCEDURE — 3008F BODY MASS INDEX DOCD: CPT | Mod: ,,, | Performed by: NURSE PRACTITIONER

## 2023-07-19 PROCEDURE — 3074F SYST BP LT 130 MM HG: CPT | Mod: ,,, | Performed by: NURSE PRACTITIONER

## 2023-07-19 PROCEDURE — 1159F PR MEDICATION LIST DOCUMENTED IN MEDICAL RECORD: ICD-10-PCS | Mod: ,,, | Performed by: NURSE PRACTITIONER

## 2023-07-19 PROCEDURE — 3074F PR MOST RECENT SYSTOLIC BLOOD PRESSURE < 130 MM HG: ICD-10-PCS | Mod: ,,, | Performed by: NURSE PRACTITIONER

## 2023-07-19 PROCEDURE — 99213 PR OFFICE/OUTPT VISIT, EST, LEVL III, 20-29 MIN: ICD-10-PCS | Mod: ,,, | Performed by: NURSE PRACTITIONER

## 2023-07-19 RX ORDER — CYCLOBENZAPRINE HCL 10 MG
10 TABLET ORAL 3 TIMES DAILY PRN
Qty: 30 TABLET | Refills: 1 | Status: SHIPPED | OUTPATIENT
Start: 2023-07-19 | End: 2023-07-27

## 2023-07-19 RX ORDER — HYDROCORTISONE 25 MG/G
CREAM TOPICAL
COMMUNITY
Start: 2023-07-05

## 2023-07-19 NOTE — PROGRESS NOTES
"Subjective:       Patient ID: Audrey Braxton is a 68 y.o. female.    Chief Complaint: Follow-up (Patient here for follow up from her lab results. )    The patient presents for follow-up neck pain.  The patient continues to neck pain.  We performed an x-ray that showed degenerative changes and straightening of the normal curvature.  Further studies are indicated    The patient's lab work is normal for age.  The patient has no symptoms of UTI so contamination is suspected on the UA    Neck Pain   This is a chronic problem. The current episode started more than 1 month ago. The problem occurs constantly. The problem has been rapidly worsening. The pain is associated with nothing. The pain is present in the occipital region. The quality of the pain is described as aching, burning, cramping, shooting and stabbing. The pain is at a severity of 10/10. The pain is severe. The symptoms are aggravated by sneezing, twisting, stress, position and bending. The pain is Worse during the day. Stiffness is present In the morning. She has tried muscle relaxants and acetaminophen (Tylenol arthritis) for the symptoms. The treatment provided mild relief.     Review of Systems   Musculoskeletal:  Positive for neck pain.  12 point review of systems conducted, negative except as stated in the history of present illness. See HPI for details.        Objective:        Visit Vitals  /76   Pulse 65   Ht 5' 4" (1.626 m)   Wt 65.3 kg (144 lb)   BMI 24.72 kg/m²        Physical Exam  Vitals and nursing note reviewed.   Constitutional:       Appearance: She is obese.   HENT:      Head: Normocephalic.      Right Ear: Tympanic membrane normal.      Left Ear: Tympanic membrane normal.      Nose: Nose normal.      Mouth/Throat:      Mouth: Mucous membranes are moist.   Eyes:      Extraocular Movements: Extraocular movements intact.   Cardiovascular:      Rate and Rhythm: Normal rate and regular rhythm.      Pulses: Normal pulses.      Heart " sounds: No murmur heard.  Pulmonary:      Effort: Pulmonary effort is normal.      Breath sounds: Normal breath sounds.   Abdominal:      Palpations: Abdomen is soft.   Musculoskeletal:         General: Normal range of motion.      Cervical back: Rigidity present.   Skin:     General: Skin is warm and dry.      Capillary Refill: Capillary refill takes less than 2 seconds.   Neurological:      Mental Status: She is alert and oriented to person, place, and time.         Assessment:           ICD-10-CM ICD-9-CM   1. Neck pain  M54.2 723.1   2. Cervicalgia  M54.2 723.1            Plan:         1. Neck pain  Patient has appointment with Neurology on the 26th of July  - cyclobenzaprine (FLEXERIL) 10 MG tablet; Take 1 tablet (10 mg total) by mouth 3 (three) times daily as needed for Muscle spasms.  Dispense: 30 tablet; Refill: 1    2. Cervicalgia  - MRI Cervical Spine Without Contrast; Future            Follow up if symptoms worsen or fail to improve.     Future Appointments   Date Time Provider Department Center   1/22/2024  8:15 AM NURSE, [facility]   1/30/2024  1:00 PM Kailey Russell NP [facility]        Past Medical History:   Diagnosis Date    Allergy     Anxiety     Cataract     Depression     Hyperlipidemia         Review of patient's allergies indicates:   Allergen Reactions    Amoxicillin-pot clavulanate Diarrhea        Current Outpatient Medications   Medication Instructions    acetaminophen (TYLENOL) 650 mg, Oral, Daily    ATIVAN 2 mg Tab 2.5 tablets, Oral, Nightly    calcium carbonate-vitamin D3 600 mg-20 mcg (800 unit) Chew 1 tablet, Oral, 2 times daily    cholecalciferol (vitamin D3) 5,000 Units, Oral, Daily    co-enzyme Q-10 50 mg, Oral, Daily    colestipoL (COLESTID) 1 g, Oral, 2 times daily    cyclobenzaprine (FLEXERIL) 10 mg, Oral, 3 times daily PRN    gabapentin (NEURONTIN) 300 mg, Oral, Nightly    hydrocortisone 2.5 % cream No dose, route, or frequency recorded.     LIVALO 2 mg, Oral, Daily    loratadine (CLARITIN) 10 mg tablet 1 tablet, Oral, Daily    montelukast (SINGULAIR) 10 mg, Oral, Daily    ZOLOFT 75 mg, Oral, Every morning          Patient Active Problem List   Diagnosis    Rib pain on left side    Post-menopausal    Hyperlipidemia    Anxiety    Left upper quadrant abdominal pain    Acute cystitis without hematuria    Diarrhea    Cough    Lymphadenopathy    Depression    Peripheral neuropathic pain    Arthritis    Right foot pain    Cervicalgia             Past Medical History:   Diagnosis Date    Allergy     Anxiety     Cataract     Depression     Hyperlipidemia           Past Surgical History:   Procedure Laterality Date    CATARACT EXTRACTION      CHOLECYSTECTOMY      EYE SURGERY             reports that she has been smoking cigarettes. She has been smoking an average of .5 packs per day. She has never used smokeless tobacco. She reports current alcohol use of about 1.0 standard drink per week. She reports that she does not use drugs.      There is no immunization history on file for this patient.     Health Maintenance   Topic Date Due    TETANUS VACCINE  Never done    Mammogram  06/30/2023    DEXA Scan  06/30/2025    Lipid Panel  07/12/2028    Hepatitis C Screening  Completed

## 2023-07-26 ENCOUNTER — HOSPITAL ENCOUNTER (OUTPATIENT)
Dept: RADIOLOGY | Facility: HOSPITAL | Age: 68
Discharge: HOME | End: 2023-07-26
Attending: NURSE PRACTITIONER
Payer: MEDICARE

## 2023-07-26 DIAGNOSIS — M54.2 CERVICALGIA: ICD-10-CM

## 2023-07-26 PROCEDURE — 72141 MRI NECK SPINE W/O DYE: CPT | Mod: TC

## 2023-07-27 ENCOUNTER — TELEPHONE (OUTPATIENT)
Dept: FAMILY MEDICINE | Facility: CLINIC | Age: 68
End: 2023-07-27
Payer: MEDICARE

## 2023-07-27 DIAGNOSIS — M54.2 NECK PAIN: Primary | ICD-10-CM

## 2023-07-27 RX ORDER — TRAMADOL HYDROCHLORIDE 50 MG/1
50 TABLET ORAL EVERY 6 HOURS PRN
Qty: 28 TABLET | Refills: 0 | Status: SHIPPED | OUTPATIENT
Start: 2023-07-27 | End: 2023-08-03

## 2023-07-27 NOTE — TELEPHONE ENCOUNTER
I called the patient and spoke with her about the MRI results. She is in 10/10 pain and cannot sleep. I discontinued the cyclobenzaprine and I am ordering tramadol.  I am also referring her to neuro surgery.

## 2023-08-09 ENCOUNTER — CLINICAL SUPPORT (OUTPATIENT)
Dept: REHABILITATION | Facility: HOSPITAL | Age: 68
End: 2023-08-09
Payer: MEDICARE

## 2023-08-09 DIAGNOSIS — Z74.09 IMPAIRED FUNCTIONAL MOBILITY AND ACTIVITY TOLERANCE: ICD-10-CM

## 2023-08-09 DIAGNOSIS — M50.323 DEGENERATION OF INTERVERTEBRAL DISC AT C6-C7 LEVEL: ICD-10-CM

## 2023-08-09 DIAGNOSIS — M54.2 CERVICALGIA: Primary | ICD-10-CM

## 2023-08-09 DIAGNOSIS — M50.80 CALCIFICATION OF INTERVERTEBRAL CARTILAGE OR DISC OF CERVICAL REGION: Primary | ICD-10-CM

## 2023-08-09 PROCEDURE — 97163 PT EVAL HIGH COMPLEX 45 MIN: CPT

## 2023-08-09 PROCEDURE — 97110 THERAPEUTIC EXERCISES: CPT

## 2023-08-09 PROCEDURE — 97140 MANUAL THERAPY 1/> REGIONS: CPT

## 2023-08-09 NOTE — PROGRESS NOTES
[facility]   Physical Therapy Initial Evaluation     Date: 8/9/2023   Name: Audrey Braxton  Clinic Number: [id number]    Therapy Diagnosis:   Encounter Diagnoses   Name Primary?    Cervicalgia Yes    Impaired functional mobility and activity tolerance      Physician: Anders Jon MD    Physician Orders: PT Eval and Treat  Medical Diagnosis from Referral: Calcification of intervertebral cartilage or disc of cervical region, Degeneration of intervertebral disc at C6-C7 level  Evaluation Date: 8/9/2023  Authorization Period Expiration: 10/08/23  Plan of Care Expiration: 11/9/2023  Visit # / Visits authorized: 1/ 24      Time In: 1256  Time Out: 1401  Total Billable Time: 65 minutes    Surgery: None  Orthopedic Precautions: None  Pertinent History: History of low back pain, moderate-significant scoliosis (cervical, thoracic, lumbar)    SUBJECTIVE   Audrey reports: She presents to PT for severe gradually progressing neck pain. She points to suboccipitals, lateral right neck, right superior periscapular area. Neck pain started ~5-6 months ago. It tends to start at ~5/10 severity in the morning but by the end of the day (5-6pm) it is 10/10. Aggravating factors include daily activity. Eased by medication (muscle relaxer does not help, 50mg tramadol 1x/day although prescribed for 2x/day, and tylenol PM x 2), ice, and sitting in her recliner in a relaxed position. She used to swim for exercise but ceased 2/2 this starting to aggravate her pain. Even walking in the pool aggravates her neck pain. She admits to numbness in both UEs all the way to hand/fingers; LUE has essentially been numb for a while, but RUE has recently started to get tingling. She is limited during driving, as she turns her entire body instead of her head. States she feels weakness in her shoulders, right weaker than left. She used to sleep prone but can no longer tolerate so she sleeps side lying with proper head support.  Sitting for too long in her bed watching TV tends to lead to severe neck pain, and admits that her head doesn't feel supported. She denies migraines (had 1 episode a long time ago, unrelated to neck pain), dizziness, dysarthria, drop attacks (although the very 1st time she took Tramadol she thinks it dropped her blood pressure, made her very weak), saddle paresthesia, bowel/bladder changes. Admits to blurry vision (started after she had cataracts removed), headaches (for the past 2 months), dysphagia (states worst at night, she started taking zinc and this lessened the severity). She admits to history of low back pain treated by chiropractor, states traction felt great. States she has numbness/tingling in both her feet.    Imaging  EXAMINATION: MRI CERVICAL SPINE WITHOUT CONTRAST: 07/26/2023     CLINICAL HISTORY: Cervicalgia, Neck pain, chronic;Neck pain, chronic, degenerative changes on xray;     COMPARISON: None available     FINDINGS:  Serial axial and sagittal 1.5 Nevaeh images obtained using T1 and T2 weighted techniques without the administration of intravenous contrast. Vertebral body height is grossly intact.  There is slight anterolisthesis of C4 on C5.  Cerebellar tonsils extend caudally to the level of foramen magnum.  The cervical cord is normal in size and caliber.  No abnormal intramedullary or intradural mass is identified without the administration of IV contrast.  Disc space narrowing, degenerative disc changes, osteophyte formation, and facet hypertrophic changes are identified.  There is reversal of the normal lordotic curve with the apex at C6-7.  No abnormal paraspinal mass is identified without the administration of IV contrast.     C2-3: No significant central spinal or neural foraminal stenosis.  Mild facet arthrosis.     C3-4: No significant central spinal or neural foraminal stenosis.  Mild facet arthrosis.     C4-5: Moderate encroachment into the left neural foramina secondary to posterior  eccentric disc bulge to the left, facet hypertrophic changes, and slight anterolisthesis of C4 on C5     C5-6: Mild encroachment into the central spinal canal and neural foramina bilaterally secondary to posterior disc bulge and facet hypertrophic changes.     C6-7: Moderate to severe encroachment into the central spinal canal and moderate encroachment into the left neural foramina secondary to posterior eccentric disc bulge to the left, posterior osteophyte formation, and facet hypertrophic changes.  There is mass effect on the cervical cord.     C7-T1: No significant central spinal or neural foraminal stenosis.     Impression:  1. Multilevel encroachment into the central spinal canal and neural foramina as described; most severe at C6-7  2. Cervical spondylosis  3. Slight anterolisthesis of C4 on C5  4. Reversal of the normal lordotic curve with the apex at C5-6         Medical History:   Past Medical History:   Diagnosis Date    Allergy     Anxiety     Cataract     Depression     Hyperlipidemia        Surgical History:   Audrey Braxton  has a past surgical history that includes Cholecystectomy; Eye surgery; and Cataract extraction.    Medications:   Audrey has a current medication list which includes the following prescription(s): acetaminophen, ativan, calcium carbonate-vitamin d3, cholecalciferol (vitamin d3), co-enzyme q-10, colestipol, gabapentin, hydrocortisone, livalo, loratadine, montelukast, and zoloft.    Allergies:   Review of patient's allergies indicates:   Allergen Reactions    Amoxicillin-pot clavulanate Diarrhea          CMS Impairment/Limitation/Restriction for FOTO Survey  Therapist reviewed FOTO scores for Audrey Braxton on 8/9/2023. FOTO documents entered into [employer] - see Media section.  Patient's Physical FS Primary Measure: 42  Risk Adjusted Statistical FOTO: 44  Limitation Score: 58%  Category: Mobility  NDI: 50% disability    OBJECTIVE     Posture    Guarded neck with minimal movement in  cervical spine; left shoulder elevated in sitting; mild forward head with straightening of cervical lordosis     Palpation    Tender to mild/moderate palpatory pressure suboccipitals, right SCM    Dermatomes  BUEs intact to light touch     Reflexes    Bilateral bicep and brachioradialis 2+     Myotomes      Grossly 3/5 throughout shoulder bilaterally, elbow muscles 4/5 bilaterally   Shoulder Clearance    Full AROM elevation, rotation bilaterally       AROM    Cervical Spine  Flexion: WFL  Extension: 50% limited  Left rotation: 50% limited  Right rotation: 50% limited   Left lateral bendin% limited   Right lateral bendin% limited     Pain with right rotation and report of crunching/popping in her neck       Passive Accessory    Central PAs - mild hypomobility cervical spine  Lateral glides - mild hypomobility cervical spine     Special Tests    Distraction: relieved pressure in her neck  Vertebral Artery: (neg)left, (neg)right                   TREATMENT     Audrey received the treatments listed below:       Time Activities   Manual 26 min --Cervical manual traction, suboccipital release  --Left passive stretch (upper traps, med scalenes, levator scap, posterior scalenes, posterior cervical muscles)  --Lateral cervical glides, central cervical PAs  --Cervical MFR  --Biofreeze to posterior neck             TherAct     TherEx 14 min HEP, education   Gait     Neuro Re-ed     Modalities     E-Stim     Dry Needling     Canalith Repositioning         Home Exercises and Patient Education Provided    Education provided:   -Plan of care, HEP, postural changes, increased AROM    Written Home Exercises Provided: yes.  Exercises were reviewed and Audrey was able to demonstrate them prior to the end of the session.  Audrey demonstrated good  understanding of the education provided.     See EMR under Media for exercises provided 2023.    ASSESSMENT     Audrey is a 68 y.o. female referred to outpatient Physical  Therapy with a medical diagnosis of Calcification of intervertebral cartilage or disc of cervical region, Degeneration of intervertebral disc at C6-C7 level. Patient presents with neck pain and stiffness that limit her functional capacity. Post session with significant improvements following manual intervention; pain significantly reduced, rotation increased ~30% bilaterally. Feel her scoliosis has likely progressed over the years, contributing to closure of her spine and subsequent symptomology. Patient will benefit from skilled outpatient Physical Therapy to address the deficits stated above and in the chart below, provide education, and to maximize patient's level of independence.     Patient prognosis is Good.     Plan of care discussed with patient: Yes  Patient's spiritual, cultural and educational needs considered and patient is agreeable to the plan of care and goals as stated below:     Anticipated Barriers for therapy: None    Goals:  Short Term Goals: 6 weeks   Patient will report at least 10% disability reduction from initial NDI score to indicate clinically significant functional improvement  Patient will report at least 10 point increase on initial FOTO score to indicate clinically significant functional improvement  Patient will report worst pain at end of day no greater than 6/10 to indicate improved functional tolerance      Long Term Goals: 12 weeks   Patient will report at least 20% disability reduction from initial NDI score to indicate clinically significant functional improvement  Patient will report at least 20 point increase on initial FOTO score to indicate clinically significant functional improvement  Patient will report worst pain at end of day no greater than 4/10 to indicate improved functional tolerance      PLAN   Plan of care Certification: 8/9/2023 to 11/9/2023.    Outpatient Physical Therapy 2-3 times weekly for 12 weeks to include the following interventions: Cervical/Lumbar  Traction, Manual Therapy, Moist Heat/ Ice, Neuromuscular Re-ed, Patient Education, Self Care, Therapeutic Activities, and Therapeutic Exercise.     Christopher Martinez, PT

## 2023-08-10 NOTE — PROGRESS NOTES
Physical Therapy Treatment Note     Name: Audrey Braxton  Clinic Number: [id number]    Therapy Diagnosis:   Encounter Diagnoses   Name Primary?    Cervicalgia Yes    Impaired functional mobility and activity tolerance      Physician: Anders Jon MD    Visit Date: 8/11/2023    Physician Orders: PT Eval and Treat  Medical Diagnosis from Referral: Calcification of intervertebral cartilage or disc of cervical region, Degeneration of intervertebral disc at C6-C7 level  Evaluation Date: 8/9/2023  Authorization Period Expiration: 10/08/23  Plan of Care Expiration: 11/9/2023  Visit # / Visits authorized: 2/ 24     Time In: 15***  Time Out: 15***  Total Billable Time: 65 minutes     Surgery: None  Orthopedic Precautions: None  Pertinent History: History of low back pain, moderate-significant scoliosis (cervical, thoracic, lumbar)    Subjective     Patient reports: ***.    CMS Impairment/Limitation/Restriction for FOTO Survey  Therapist reviewed FOTO scores for Audrey Braxton on 8/9/2023. FOTO documents entered into [employer] - see Media section.  Patient's Physical FS Primary Measure: 42  Risk Adjusted Statistical FOTO: 44  Limitation Score: 58%  Category: Mobility  NDI: 50% disability    Objective     Audrey received the following treatment:     Time Activities   Manual *** min    TherAct *** min    TherEx *** min    Gait     Neuro Re-ed     Modalities     E-Stim     Dry Needling     Canalith Repositioning           Home Exercises Provided and Patient Education Provided     Education provided:   -Plan of care, HEP, ***    Assessment     ***    Patient prognosis is {REHAB PROGNOSIS OHS:[id number]}.      Anticipated barriers to physical therapy: None    Goals: Audrey is working towards her goals.  Short Term Goals: 6 weeks   Patient will report at least 10% disability reduction from initial NDI score to indicate clinically significant functional improvement  Patient will report at least 10 point increase on initial FOTO score to  indicate clinically significant functional improvement  Patient will report worst pain at end of day no greater than 6/10 to indicate improved functional tolerance        Long Term Goals: 12 weeks   Patient will report at least 20% disability reduction from initial NDI score to indicate clinically significant functional improvement  Patient will report at least 20 point increase on initial FOTO score to indicate clinically significant functional improvement  Patient will report worst pain at end of day no greater than 4/10 to indicate improved functional tolerance    Plan     2-3x/week x 12 weeks    Christopher Martinez, PT

## 2023-08-11 ENCOUNTER — CLINICAL SUPPORT (OUTPATIENT)
Dept: REHABILITATION | Facility: HOSPITAL | Age: 68
End: 2023-08-11
Payer: MEDICARE

## 2023-08-11 DIAGNOSIS — Z74.09 IMPAIRED FUNCTIONAL MOBILITY AND ACTIVITY TOLERANCE: ICD-10-CM

## 2023-08-11 DIAGNOSIS — M54.2 CERVICALGIA: Primary | ICD-10-CM

## 2023-08-11 PROCEDURE — 97110 THERAPEUTIC EXERCISES: CPT

## 2023-08-11 PROCEDURE — 97140 MANUAL THERAPY 1/> REGIONS: CPT

## 2023-08-11 NOTE — PLAN OF CARE
Physical Therapy Treatment Note     Name: Audrey Braxton  Clinic Number: [id number]    Therapy Diagnosis:   Encounter Diagnoses   Name Primary?    Cervicalgia Yes    Impaired functional mobility and activity tolerance      Physician: Anders Jon MD    Visit Date: 8/11/2023    Physician Orders: PT Eval and Treat  Medical Diagnosis from Referral: Calcification of intervertebral cartilage or disc of cervical region, Degeneration of intervertebral disc at C6-C7 level  Evaluation Date: 8/9/2023  Authorization Period Expiration: 10/08/23  Plan of Care Expiration: 11/9/2023  Visit # / Visits authorized: 2/ 24     Time In: 1005  Time Out: 1051  Total Billable Time: 46 minutes     Surgery: None  Orthopedic Precautions: None  Pertinent History: History of low back pain, moderate-significant scoliosis (cervical, thoracic, lumbar)    Subjective     Patient reports: 5/10 pain, has held up well since previous session (evaluation) and largely stayed at this level since.    CMS Impairment/Limitation/Restriction for FOTO Survey  Therapist reviewed FOTO scores for Audrey Braxton on 8/9/2023. FOTO documents entered into [employer] - see Media section.  Patient's Physical FS Primary Measure: 42  Risk Adjusted Statistical FOTO: 44  Limitation Score: 58%  Category: Mobility  NDI: 50% disability    Objective     Audrey received the following treatment:     Time Activities   Manual 30 min --Cervical manual traction, suboccipital release  --Left passive stretch (upper traps, med scalenes, levator scap, posterior scalenes, posterior cervical muscles)  --Lateral cervical glides, central cervical PAs  --PT-assisted (chin tuck, chin tuck/head lift)  --Biofreeze to posterior neck           TherAct     TherEx 16 min MHP to neck to reduce cervical tissue tension, Biofreeze to neck to reduce post-exercise/manual soreness      AROM - supine cervical rotation bilateral    AROM - seated cervical rotation bilateral (when going L, PT-provided right  directed cervical glides)       Gait     Neuro Re-ed     Modalities     E-Stim     Dry Needling     Canalith Repositioning           Home Exercises Provided and Patient Education Provided     Education provided:   -Plan of care, HEP, activity and no guarding    Assessment     Post-session pain 0/10. She has shown improvement, as her pain reduction has been sustained since previous session. Right rotation ROM normalized, left rotation with only 10% limitation. Elimination of pain during left rotation when PT provided right-directed cervical glide. Suspect that her long-term guarding and immobility has led to decreased cervical segmental and physiologic mobility; as she continues to increase her AROM during ADLs, she will accommodate to this motion occurring and have less of a pain response.    Patient prognosis is Good.      Anticipated barriers to physical therapy: None    Goals: Audrey is working towards her goals.  Short Term Goals: 6 weeks   Patient will report at least 10% disability reduction from initial NDI score to indicate clinically significant functional improvement  Patient will report at least 10 point increase on initial FOTO score to indicate clinically significant functional improvement  Patient will report worst pain at end of day no greater than 6/10 to indicate improved functional tolerance        Long Term Goals: 12 weeks   Patient will report at least 20% disability reduction from initial NDI score to indicate clinically significant functional improvement  Patient will report at least 20 point increase on initial FOTO score to indicate clinically significant functional improvement  Patient will report worst pain at end of day no greater than 4/10 to indicate improved functional tolerance    Plan     2-3x/week x 12 weeks    Christopher Martinez, PT

## 2023-08-14 ENCOUNTER — CLINICAL SUPPORT (OUTPATIENT)
Dept: REHABILITATION | Facility: HOSPITAL | Age: 68
End: 2023-08-14
Payer: MEDICARE

## 2023-08-14 DIAGNOSIS — Z74.09 IMPAIRED FUNCTIONAL MOBILITY AND ACTIVITY TOLERANCE: ICD-10-CM

## 2023-08-14 DIAGNOSIS — M54.2 CERVICALGIA: Primary | ICD-10-CM

## 2023-08-14 PROCEDURE — 97110 THERAPEUTIC EXERCISES: CPT

## 2023-08-14 PROCEDURE — 97140 MANUAL THERAPY 1/> REGIONS: CPT
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
## 2023-08-16 ENCOUNTER — CLINICAL SUPPORT (OUTPATIENT)
Dept: REHABILITATION | Facility: HOSPITAL | Age: 68
End: 2023-08-16
Payer: MEDICARE

## 2023-08-16 DIAGNOSIS — Z74.09 IMPAIRED FUNCTIONAL MOBILITY AND ACTIVITY TOLERANCE: ICD-10-CM

## 2023-08-16 DIAGNOSIS — M54.2 CERVICALGIA: Primary | ICD-10-CM

## 2023-08-16 PROCEDURE — 97140 MANUAL THERAPY 1/> REGIONS: CPT

## 2023-08-16 PROCEDURE — 97110 THERAPEUTIC EXERCISES: CPT

## 2023-08-16 PROCEDURE — 97112 NEUROMUSCULAR REEDUCATION: CPT

## 2023-08-16 NOTE — PLAN OF CARE
Physical Therapy Treatment Note     Name: Audrey Braxton  Clinic Number: [id number]    Therapy Diagnosis:   Encounter Diagnoses   Name Primary?    Cervicalgia Yes    Impaired functional mobility and activity tolerance      Physician: Anders Jon MD    Visit Date: 8/16/2023    Physician Orders: PT Eval and Treat  Medical Diagnosis from Referral: Calcification of intervertebral cartilage or disc of cervical region, Degeneration of intervertebral disc at C6-C7 level  Evaluation Date: 8/9/2023  Authorization Period Expiration: 10/08/23  Plan of Care Expiration: 11/9/2023  Visit # / Visits authorized: 4/ 24     Time In: 1336  Time Out: 1450  Total Billable Time: 74 minutes     Surgery: None  Orthopedic Precautions: None  Pertinent History: History of low back pain, moderate-significant scoliosis (cervical, thoracic, lumbar)    Subjective     Patient reports: Woke up yesterday morning with neck stiffness and moderate to severe pain. It has since eased up and today is not as bad.    CMS Impairment/Limitation/Restriction for FOTO Survey  Therapist reviewed FOTO scores for Audrey Braxton on 8/9/2023. FOTO documents entered into [employer] - see Media section.  Patient's Physical FS Primary Measure: 42  Risk Adjusted Statistical FOTO: 44  Limitation Score: 58%  Category: Mobility  NDI: 50% disability    Objective     Audrey received the following treatment:       Time Activities   Manual 38 min --Cervical manual traction, suboccipital release  --MFR posterior neck  --Lateral cervical glides, central cervical PAs  --PT-resisted cervical (lateral flexion bilaterally, cervical extension)  --Biofreeze to posterior neck          --Lumbar, SI, and hip assessment           TherAct     TherEx 18 min MHP to neck to reduce cervical tissue tension, supine lying for cervical unloading, Biofreeze to neck to reduce post-exercise/manual soreness        --Chin tuck  --Chin tuck/head lift  --HEP           E-Stim 18 min Portuguese Stim to bilateral  rhomboids and upper traps - co-contract 5:5 time with scap squeezes, intensity to tolerance  -2-sec ramp up, 50pps    IFC to same area as above, intensity to tolerance  -sweep 80-140Hz           Home Exercises Provided and Patient Education Provided     Education provided:   -Plan of care, HEP, swimming and treadmill walking safety guideline, HEP uploaded to media section of EMR (printed, given, and reviewed 8/16/23)    Assessment     Post-session pain 0/10. She has shown improvement, as her pain reduction has been sustained since previous session. Right rotation ROM normalized, left rotation with only 10% limitation. Elimination of pain during left rotation when PT provided right-directed cervical glide. Suspect that her long-term guarding and immobility has led to decreased cervical segmental and physiologic mobility; as she continues to increase her AROM during ADLs, she will accommodate to this motion occurring and have less of a pain response.      Lumbar assessment done - negative SI joint (distraction and thigh thrust), negative KIN, negative piriformis test, LTR to the left eliminated all symptoms. Suspect she has joint closure that induces pain, thus leading her to be have flexion preference. This is further supported with her report that lying supine increases pain. Will assess and treat as able in time allotted.    Patient prognosis is Good.      Anticipated barriers to physical therapy: None    Goals: Audrey is working towards her goals.  Short Term Goals: 6 weeks   Patient will report at least 10% disability reduction from initial NDI score to indicate clinically significant functional improvement  Patient will report at least 10 point increase on initial FOTO score to indicate clinically significant functional improvement  Patient will report worst pain at end of day no greater than 6/10 to indicate improved functional tolerance        Long Term Goals: 12 weeks   Patient will report at least 20%  disability reduction from initial NDI score to indicate clinically significant functional improvement  Patient will report at least 20 point increase on initial FOTO score to indicate clinically significant functional improvement  Patient will report worst pain at end of day no greater than 4/10 to indicate improved functional tolerance    Plan     2-3x/week x 12 weeks    Christopher Martinez, PT

## 2023-08-18 ENCOUNTER — TELEPHONE (OUTPATIENT)
Dept: FAMILY MEDICINE | Facility: CLINIC | Age: 68
End: 2023-08-18
Payer: MEDICARE

## 2023-08-18 ENCOUNTER — CLINICAL SUPPORT (OUTPATIENT)
Dept: REHABILITATION | Facility: HOSPITAL | Age: 68
End: 2023-08-18
Payer: MEDICARE

## 2023-08-18 DIAGNOSIS — M85.89 OSTEOPENIA OF MULTIPLE SITES: Primary | ICD-10-CM

## 2023-08-18 DIAGNOSIS — Z74.09 IMPAIRED FUNCTIONAL MOBILITY AND ACTIVITY TOLERANCE: ICD-10-CM

## 2023-08-18 DIAGNOSIS — M54.2 CERVICALGIA: Primary | ICD-10-CM

## 2023-08-18 PROCEDURE — 97110 THERAPEUTIC EXERCISES: CPT

## 2023-08-18 PROCEDURE — 97140 MANUAL THERAPY 1/> REGIONS: CPT

## 2023-08-18 RX ORDER — TRAMADOL HYDROCHLORIDE 50 MG/1
50 TABLET ORAL EVERY 6 HOURS PRN
Qty: 28 TABLET | Refills: 0 | Status: SHIPPED | OUTPATIENT
Start: 2023-08-18 | End: 2023-08-18 | Stop reason: CLARIF

## 2023-08-18 RX ORDER — ALENDRONATE SODIUM 70 MG/1
70 TABLET ORAL
Qty: 4 TABLET | Refills: 11 | Status: SHIPPED | OUTPATIENT
Start: 2023-08-18 | End: 2024-03-27

## 2023-08-18 NOTE — TELEPHONE ENCOUNTER
Pt would like to a refill on Tramadol.  She did see Dr. Jon on 8/1/23.  Records from his visit is in .
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
## 2023-08-18 NOTE — PROGRESS NOTES
I spoke with the patient.  She said she went to see the neurosurgeon he was rude to her.  He told her that what she has happens to all old people.  She is requesting that I give her tramadol.  The patient takes Ativan.  I told her the combination is not good and can cause respiratory depression.  I will order Fosamax for her.  She is going to visit finished physical therapy and if she is continuing to have difficulty I told her I would send her to pain management.

## 2023-08-21 ENCOUNTER — CLINICAL SUPPORT (OUTPATIENT)
Dept: REHABILITATION | Facility: HOSPITAL | Age: 68
End: 2023-08-21
Payer: MEDICARE

## 2023-08-21 DIAGNOSIS — M54.2 CERVICALGIA: Primary | ICD-10-CM

## 2023-08-21 DIAGNOSIS — Z74.09 IMPAIRED FUNCTIONAL MOBILITY AND ACTIVITY TOLERANCE: ICD-10-CM

## 2023-08-21 PROCEDURE — 97110 THERAPEUTIC EXERCISES: CPT

## 2023-08-21 PROCEDURE — 97140 MANUAL THERAPY 1/> REGIONS: CPT

## 2023-08-21 NOTE — PLAN OF CARE
Physical Therapy Treatment Note     Name: Audrey Braxton  Clinic Number: [id number]    Therapy Diagnosis:   Encounter Diagnoses   Name Primary?    Cervicalgia Yes    Impaired functional mobility and activity tolerance      Physician: Anders Jon MD    Visit Date: 8/21/2023    Physician Orders: PT Eval and Treat  Medical Diagnosis from Referral: Calcification of intervertebral cartilage or disc of cervical region, Degeneration of intervertebral disc at C6-C7 level  Evaluation Date: 8/9/2023  Authorization Period Expiration: 10/08/23  Plan of Care Expiration: 11/9/2023  Visit # / Visits authorized: 6/ 24     Time In: 1339  Time Out: 1410  Total Billable Time: 31 minutes     Surgery: None  Orthopedic Precautions: None  Pertinent History: History of low back pain, moderate-significant scoliosis (cervical, thoracic, lumbar)    Subjective     Patient reports: 1/10 pain today. Only changes she made was to do the HEP and alternate ice/cold along with Ibuprofen as we discussed.    CMS Impairment/Limitation/Restriction for FOTO Survey  Therapist reviewed FOTO scores for Audrey Braxton on 8/9/2023. FOTO documents entered into [employer] - see Media section.  Patient's Physical FS Primary Measure: 42  Risk Adjusted Statistical FOTO: 44  Limitation Score: 58%  Category: Mobility  NDI: 50% disability      Therapist reviewed FOTO scores for Audrey Braxton on 8/TBD/2023. FOTO documents entered into [employer] - see Media section.  Patient's Physical FS Primary Measure: 69  Risk Adjusted Statistical FOTO: 44  Limitation Score: 31%  Category: Mobility  NDI: 12% disability      Objective     Audrey received the following treatment:       Time Activities   Manual 12 min --Cervical manual traction, suboccipital release           TherAct     TherEx 19 min MHP to neck, cervical rotation AROM, shoulder ER band (bilateral), education       E-Stim  Andorran Stim to bilateral rhomboids and upper traps - co-contract 5:5 time with scap squeezes,  intensity to tolerance  -2-sec ramp up, 50pps    IFC to same area as above, intensity to tolerance  -sweep 80-140Hz           Home Exercises Provided and Patient Education Provided     Education provided:   -Plan of care, HEP, home traction unit, modalities, medication as adjunct to PT, AROM cervical exercises, lumbar exercises    Assessment     Post-session pain 0/10, bilateral active rotation in dependent position full and painless. Adherence to HEP has clearly been a significant positive factor for her. She has shown significant improvement, as her pain reduction has been sustained since previous session. So long as she continues adherence to HEP, expect her to maintain this minimal to no pain state.    Lumbar assessment done on 8/16/23 - negative SI joint (distraction and thigh thrust), negative KIN, negative piriformis test, LTR to the left eliminated all symptoms. Suspect she has joint closure that induces pain, thus leading her to be have flexion preference. This is further supported with her report that lying supine increases pain. Will assess and treat as able in time allotted.    Patient prognosis is Good.      Anticipated barriers to physical therapy: None    Goals: Audrey is working towards her goals.  Short Term Goals: 6 weeks   Patient will report at least 10% disability reduction from initial NDI score to indicate clinically significant functional improvement  Patient will report at least 10 point increase on initial FOTO score to indicate clinically significant functional improvement  Patient will report worst pain at end of day no greater than 6/10 to indicate improved functional tolerance        Long Term Goals: 12 weeks   Patient will report at least 20% disability reduction from initial NDI score to indicate clinically significant functional improvement  Patient will report at least 20 point increase on initial FOTO score to indicate clinically significant functional improvement  Patient will  report worst pain at end of day no greater than 4/10 to indicate improved functional tolerance    Plan     2-3x/week x 12 weeks    Christopher Martinez, PT

## 2023-08-23 ENCOUNTER — CLINICAL SUPPORT (OUTPATIENT)
Dept: REHABILITATION | Facility: HOSPITAL | Age: 68
End: 2023-08-23
Payer: MEDICARE

## 2023-08-23 DIAGNOSIS — Z74.09 IMPAIRED FUNCTIONAL MOBILITY AND ACTIVITY TOLERANCE: ICD-10-CM

## 2023-08-23 DIAGNOSIS — M54.2 CERVICALGIA: Primary | ICD-10-CM

## 2023-08-23 PROCEDURE — 97110 THERAPEUTIC EXERCISES: CPT

## 2023-08-23 PROCEDURE — 97140 MANUAL THERAPY 1/> REGIONS: CPT

## 2023-08-23 NOTE — PLAN OF CARE
Physical Therapy Treatment Note     Name: Audrey Braxton  Clinic Number: [id number]    Therapy Diagnosis:   Encounter Diagnoses   Name Primary?    Cervicalgia Yes    Impaired functional mobility and activity tolerance      Physician: Anders Jon MD    Visit Date: 8/23/2023    Physician Orders: PT Eval and Treat  Medical Diagnosis from Referral: Calcification of intervertebral cartilage or disc of cervical region, Degeneration of intervertebral disc at C6-C7 level  Evaluation Date: 8/9/2023  Authorization Period Expiration: 10/08/23  Plan of Care Expiration: 11/9/2023  Visit # / Visits authorized: 7/ 24     Time In: 1329  Time Out: 1430  Total Billable Time: 61 minutes     Surgery: None  Orthopedic Precautions: None  Pertinent History: History of low back pain, moderate-significant scoliosis (cervical, thoracic, lumbar)    Subjective     Patient reports: 2/10 pain today. Monday 8/10, down to 6/10 yesterday (Tuesday), now 2/10 Wednesday. Has continued with HEP and alternate ice/cold along with Ibuprofen as we discussed. States some stiffness present in her neck. Got home cervical traction unit, but it didn't help too much (she did it when she had no pain, but had neck stiffness).    CMS Impairment/Limitation/Restriction for FOTO Survey  Therapist reviewed FOTO scores for Audrey Braxton on 8/9/2023. FOTO documents entered into [employer] - see Media section.  Patient's Physical FS Primary Measure: 42  Risk Adjusted Statistical FOTO: 44  Limitation Score: 58%  Category: Mobility  NDI: 50% disability      Therapist reviewed FOTO scores for Audrey Braxton on 8/TBD/2023. FOTO documents entered into [employer] - see Media section.  Patient's Physical FS Primary Measure: 69  Risk Adjusted Statistical FOTO: 44  Limitation Score: 31%  Category: Mobility  NDI: 12% disability      Objective     Audrey received the following treatment:       Time Activities   Manual 26 min --Cervical manual traction, thoracic manip (T1-T5), right lateral  spinous process lateral glides (with and without left cervical rotation), lateral cervical glides, cervical PAs     TherAct     TherEx 35 min MHP to neck, cervical rotation AROM, thread the needle, cervical mechanical traction (25# high, 13# low, 4 step ramp up and down, 30 seconds pull, 10 second rest), cervical rotation active, education       E-Stim  Tongan Stim to bilateral rhomboids and upper traps - co-contract 5:5 time with scap squeezes, intensity to tolerance  -2-sec ramp up, 50pps    IFC to same area as above, intensity to tolerance  -sweep 80-140Hz           Home Exercises Provided and Patient Education Provided     Education provided:   -Plan of care, HEP, home traction unit, modalities, medication as adjunct to PT, AROM cervical exercises, lumbar exercises    Assessment     Post-session pain 2/10. Prefers manual traction to mechanical. Noted some thoracic hypomobility, as when PT provided right directed thoracic glide it eliminated pain with cervical rotation. Adherence to HEP has clearly been a significant positive factor for her. She has shown significant improvement, as her pain reduction has been sustained since previous session. So long as she continues adherence to HEP, expect her to maintain this minimal to no pain state. Issue is the degree of spondylosis in her cervical spine, which may also be a pain . Recommended HEP continuity to help maintain adequate flexibility and minimize soft-tissue contribution to mechanical pain.    Lumbar assessment done on 8/16/23 - negative SI joint (distraction and thigh thrust), negative KIN, negative piriformis test, LTR to the left eliminated all symptoms. Suspect she has joint closure that induces pain, thus leading her to be have flexion preference. This is further supported with her report that lying supine increases pain. Will assess and treat as able in time allotted.    Patient prognosis is Good.      Anticipated barriers to physical therapy:  None    Goals: Audrey is working towards her goals.  Short Term Goals: 6 weeks   Patient will report at least 10% disability reduction from initial NDI score to indicate clinically significant functional improvement  Patient will report at least 10 point increase on initial FOTO score to indicate clinically significant functional improvement  Patient will report worst pain at end of day no greater than 6/10 to indicate improved functional tolerance        Long Term Goals: 12 weeks   Patient will report at least 20% disability reduction from initial NDI score to indicate clinically significant functional improvement  Patient will report at least 20 point increase on initial FOTO score to indicate clinically significant functional improvement  Patient will report worst pain at end of day no greater than 4/10 to indicate improved functional tolerance    Plan     2-3x/week x 12 weeks    Christopher Martinez, PT

## 2023-08-28 ENCOUNTER — CLINICAL SUPPORT (OUTPATIENT)
Dept: REHABILITATION | Facility: HOSPITAL | Age: 68
End: 2023-08-28
Payer: MEDICARE

## 2023-08-28 DIAGNOSIS — M54.2 CERVICALGIA: Primary | ICD-10-CM

## 2023-08-28 DIAGNOSIS — Z74.09 IMPAIRED FUNCTIONAL MOBILITY AND ACTIVITY TOLERANCE: ICD-10-CM

## 2023-08-28 PROCEDURE — 97140 MANUAL THERAPY 1/> REGIONS: CPT

## 2023-08-28 NOTE — PLAN OF CARE
Physical Therapy Treatment Note     Name: Audrey Braxton  Clinic Number: [id number]    Therapy Diagnosis:   Encounter Diagnoses   Name Primary?    Cervicalgia Yes    Impaired functional mobility and activity tolerance      Physician: Anders Jon MD    Visit Date: 8/28/2023    Physician Orders: PT Eval and Treat  Medical Diagnosis from Referral: Calcification of intervertebral cartilage or disc of cervical region, Degeneration of intervertebral disc at C6-C7 level  Evaluation Date: 8/9/2023  Authorization Period Expiration: 10/08/23  Plan of Care Expiration: 11/9/2023  Visit # / Visits authorized: 8/ 24     Time In: 1340  Time Out: 1418  Total Billable Time: 38 minutes     Surgery: None  Orthopedic Precautions: None  Pertinent History: History of low back pain, moderate-significant scoliosis (cervical, thoracic, lumbar)    Subjective     Patient reports: 5/10 pain today. Over the last 5-6 days, able to manage her pain. 2/10 for about 20 days after PT, with gradual increase. Cold packs tend to reduce pain, no influence on pain with hot packs. Ibuprofen also tends to reduce pain faster and more efficiently than Tramadol.    CMS Impairment/Limitation/Restriction for FOTO Survey  Therapist reviewed FOTO scores for Audrey Braxton on 8/9/2023. FOTO documents entered into EPIC - see Media section.  Patient's Physical FS Primary Measure: 42  Risk Adjusted Statistical FOTO: 44  Limitation Score: 58%  Category: Mobility  NDI: 50% disability      Therapist reviewed FOTO scores for Audrey Braxton on 8/21/2023. FOTO documents entered into EPIC - see Media section.  Patient's Physical FS Primary Measure: 69  Risk Adjusted Statistical FOTO: 44  Limitation Score: 31%  Category: Mobility  NDI: 12% disability      Objective     Audrey received the following treatment:       Time Activities   Manual 38 min --Cervical manual traction, spinous process lateral glides (left direction), lateral cervical glides, assisted (chin tuck, right  levator stretch, suboccipitals stretch), cervical rotation (with chin tuck), education, Biofreeze to posterior neck     TherAct     TherEx         E-Stim  [place] Stim to bilateral rhomboids and upper traps - co-contract 5:5 time with scap squeezes, intensity to tolerance  -2-sec ramp up, 50pps    IFC to same area as above, intensity to tolerance  -sweep 80-140Hz           Home Exercises Provided and Patient Education Provided     Education provided:   -Plan of care, HEP, home traction unit, modalities, medication as adjunct to PT, AROM cervical exercises, potential spinal CSI (pain management)    Assessment     Post-session pain 1-2/10. Prefers manual traction to mechanical. Cervical rotation combined with chin tuck essentially eliminated pain, compared to rotation without chin tuck which elicited more pain. Suspect that mild spinal gapping from chin tuck reduced osseous irritation from spondylosis. Issue is the degree of spondylosis in her cervical spine, which may also be a pain . Adherence to HEP has clearly been a significant positive factor for her. She has shown significant improvement, as her pain reduction has been sustained since previous session. So long as she continues adherence to HEP, expect her to maintain this minimal to no pain state. Recommended HEP continuity to help maintain adequate flexibility and minimize soft-tissue contribution to mechanical pain. With all above in mind as well as efficacy of ibuprofen and cold pack, this PT feels that she would be a great candidate for steroid injection to her neck.    Lumbar assessment done on 8/16/23 - negative SI joint (distraction and thigh thrust), negative KIN, negative piriformis test, LTR to the left eliminated all symptoms. Suspect she has joint closure that induces pain, thus leading her to be have flexion preference. This is further supported with her report that lying supine increases pain. Will assess and treat as able in time  allotted.    Patient prognosis is Good.      Anticipated barriers to physical therapy: None    Goals: Audrey is working towards her goals.  Short Term Goals: 6 weeks   Patient will report at least 10% disability reduction from initial NDI score to indicate clinically significant functional improvement (goal met)  Patient will report at least 10 point increase on initial FOTO score to indicate clinically significant functional improvement (goal met)  Patient will report worst pain at end of day no greater than 6/10 to indicate improved functional tolerance (goal met)        Long Term Goals: 12 weeks   Patient will report at least 20% disability reduction from initial NDI score to indicate clinically significant functional improvement (goal met)  Patient will report at least 20 point increase on initial FOTO score to indicate clinically significant functional improvement (goal met)  Patient will report worst pain at end of day no greater than 4/10 to indicate improved functional tolerance    Plan     2-3x/week x 12 weeks    Christopher Martinez, PT

## 2023-08-30 ENCOUNTER — CLINICAL SUPPORT (OUTPATIENT)
Dept: REHABILITATION | Facility: HOSPITAL | Age: 68
End: 2023-08-30
Payer: MEDICARE

## 2023-08-30 DIAGNOSIS — Z74.09 IMPAIRED FUNCTIONAL MOBILITY AND ACTIVITY TOLERANCE: ICD-10-CM

## 2023-08-30 DIAGNOSIS — M54.2 CERVICALGIA: Primary | ICD-10-CM

## 2023-08-30 PROCEDURE — 97140 MANUAL THERAPY 1/> REGIONS: CPT

## 2023-08-30 PROCEDURE — 97110 THERAPEUTIC EXERCISES: CPT

## 2023-08-30 NOTE — PLAN OF CARE
Physical Therapy Treatment Note     Name: Audrey Braxton  Clinic Number: [id number]    Therapy Diagnosis:   Encounter Diagnoses   Name Primary?    Cervicalgia Yes    Impaired functional mobility and activity tolerance      Physician: Anders Jon MD    Visit Date: 8/30/2023    Physician Orders: PT Eval and Treat  Medical Diagnosis from Referral: Calcification of intervertebral cartilage or disc of cervical region, Degeneration of intervertebral disc at C6-C7 level  Evaluation Date: 8/9/2023  Authorization Period Expiration: 10/08/23  Plan of Care Expiration: 11/9/2023  Visit # / Visits authorized: 9/ 24     Time In: 1337  Time Out: 1425  Total Billable Time: 48 minutes     Surgery: None  Orthopedic Precautions: None  Pertinent History: History of low back pain, moderate-significant scoliosis (cervical, thoracic, lumbar)    Subjective     Patient reports: No pain today, just having stiffness in her neck. Has been doing HEP including newly added activities. Brought in cervical traction strap for us to look at and try to figure how to don most efficiently. No longer takes Tramadol because she resumed muscle relaxer. MRs with ibuprofen give her the most relief. Goes back to MD on 9/12.    CMS Impairment/Limitation/Restriction for FOTO Survey  Therapist reviewed FOTO scores for Audrey Braxton on 8/9/2023. FOTO documents entered into EPIC - see Media section.  Patient's Physical FS Primary Measure: 42  Risk Adjusted Statistical FOTO: 44  Limitation Score: 58%  Category: Mobility  NDI: 50% disability      Therapist reviewed FOTO scores for Audrey Braxton on 8/21/2023. FOTO documents entered into EPIC - see Media section.  Patient's Physical FS Primary Measure: 69  Risk Adjusted Statistical FOTO: 44  Limitation Score: 31%  Category: Mobility  NDI: 12% disability      Objective     Audrey received the following treatment:       Time Activities   Manual 18 min --Cervical manual traction, lateral cervical glides, passive  (chin tuck, chin tuck head lift), thoracic thrust manip     TherAct  MHP, education   TherEx 30 min MHP to neck, cervical rotation (with chin tuck), seated thoracic extension with ball at t-spine spinous processes, education       E-Stim  [place] Stim to bilateral rhomboids and upper traps - co-contract 5:5 time with scap squeezes, intensity to tolerance  -2-sec ramp up, 50pps    IFC to same area as above, intensity to tolerance  -sweep 80-140Hz           Home Exercises Provided and Patient Education Provided     Education provided:   -Plan of care, HEP, home traction unit, modalities, medication as adjunct to PT, AROM cervical exercises, potential spinal CSI (pain management)    Assessment     Post-session pain minimal to none, however increased crepitus with rotation (suspect increased decompression improved joint play). Prefers manual traction to mechanical. Cervical rotation combined with chin tuck essentially eliminated pain, compared to rotation without chin tuck which elicited more pain. Suspect that mild spinal gapping from chin tuck reduced osseous irritation from spondylosis. Issue is the degree of spondylosis in her cervical spine, which may also be a pain . Adherence to HEP has clearly been a significant positive factor for her. She has shown significant improvement, as her pain reduction has been sustained since previous session. So long as she continues adherence to HEP, expect her to maintain this minimal to no pain state. Recommended HEP continuity to help maintain adequate flexibility and minimize soft-tissue contribution to mechanical pain. With all above in mind as well as efficacy of ibuprofen and cold pack, this PT feels that she would be a great candidate for steroid injection to her neck.    Lumbar assessment done on 8/16/23 - negative SI joint (distraction and thigh thrust), negative KIN, negative piriformis test, LTR to the left eliminated all symptoms. Suspect she has joint closure  that induces pain, thus leading her to be have flexion preference. This is further supported with her report that lying supine increases pain. Will assess and treat as able in time allotted.    Patient prognosis is Good.      Anticipated barriers to physical therapy: None    Goals: Audrey is working towards her goals.  Short Term Goals: 6 weeks   Patient will report at least 10% disability reduction from initial NDI score to indicate clinically significant functional improvement (goal met)  Patient will report at least 10 point increase on initial FOTO score to indicate clinically significant functional improvement (goal met)  Patient will report worst pain at end of day no greater than 6/10 to indicate improved functional tolerance (goal met)        Long Term Goals: 12 weeks   Patient will report at least 20% disability reduction from initial NDI score to indicate clinically significant functional improvement (goal met)  Patient will report at least 20 point increase on initial FOTO score to indicate clinically significant functional improvement (goal met)  Patient will report worst pain at end of day no greater than 4/10 to indicate improved functional tolerance    Plan     2-3x/week x 12 weeks    Christopher Martinez, PT

## 2023-09-01 ENCOUNTER — CLINICAL SUPPORT (OUTPATIENT)
Dept: REHABILITATION | Facility: HOSPITAL | Age: 68
End: 2023-09-01
Payer: MEDICARE

## 2023-09-01 DIAGNOSIS — M54.2 CERVICALGIA: Primary | ICD-10-CM

## 2023-09-01 DIAGNOSIS — Z74.09 IMPAIRED FUNCTIONAL MOBILITY AND ACTIVITY TOLERANCE: ICD-10-CM

## 2023-09-01 PROCEDURE — 97110 THERAPEUTIC EXERCISES: CPT

## 2023-09-01 PROCEDURE — 97140 MANUAL THERAPY 1/> REGIONS: CPT

## 2023-09-01 NOTE — PLAN OF CARE
Physical Therapy Treatment Note     Name: Audrey Braxton  Clinic Number: [id number]    Therapy Diagnosis:   Encounter Diagnoses   Name Primary?    Cervicalgia Yes    Impaired functional mobility and activity tolerance      Physician: Anders Jon MD    Visit Date: 9/1/2023    Physician Orders: PT Eval and Treat  Medical Diagnosis from Referral: Calcification of intervertebral cartilage or disc of cervical region, Degeneration of intervertebral disc at C6-C7 level  Evaluation Date: 8/9/2023  Authorization Period Expiration: 10/08/23  Plan of Care Expiration: 11/9/2023  Visit # / Visits authorized: 10/ 24     Time In: 1334  Time Out: 1435  Total Billable Time: 61 minutes     Surgery: None  Orthopedic Precautions: None  Pertinent History: History of low back pain, moderate-significant scoliosis (cervical, thoracic, lumbar)    Subjective     Patient reports: 6/10 pain today. Severe yesterday but eased down. No pain when left PT two days ago. No significant change in activity. MRs with ibuprofen give her the most relief. Goes back to MD on 9/12.    CMS Impairment/Limitation/Restriction for FOTO Survey  Therapist reviewed FOTO scores for Audrey Braxton on 8/9/2023. FOTO documents entered into EPIC - see Media section.  Patient's Physical FS Primary Measure: 42  Risk Adjusted Statistical FOTO: 44  Limitation Score: 58%  Category: Mobility  NDI: 50% disability      Therapist reviewed FOTO scores for Audrey Braxton on 8/21/2023. FOTO documents entered into EPIC - see Media section.  Patient's Physical FS Primary Measure: 69  Risk Adjusted Statistical FOTO: 44  Limitation Score: 31%  Category: Mobility  NDI: 12% disability      Objective     Audrey received the following treatment:       Time Activities   Manual 36 min --Cervical manual traction, lateral cervical glides (right), passive (chin tuck, chin tuck head lift), thoracic PAs (grade 1-5), passive right neck stretch (levator, upper traps, mid and ant scalenes),  Biofreeze to neck     TherAct  MHP, education   TherEx 25 min Education, thread the needle (bilateral), active cervical rotation       E-Stim  [place] Stim to bilateral rhomboids and upper traps - co-contract 5:5 time with scap squeezes, intensity to tolerance  -2-sec ramp up, 50pps    IFC to same area as above, intensity to tolerance  -sweep 80-140Hz           Home Exercises Provided and Patient Education Provided     Education provided:   -Plan of care, HEP, home traction unit, modalities, medication as adjunct to PT, AROM cervical exercises, potential spinal CSI (pain management)    Assessment     Post-session pain 4/10, again with increased crepitus with rotation (suspect increased decompression improved joint play). Prefers manual traction to mechanical. Multiple cavitations throughout entire thoracic spine, which I suspect she has some hypomobility further exacerbating cervical spine condition. We are working to increase segmental and physiologic mobility. She has maintained full ROM cervical rotation bilaterally, and with little pain into end range which is significantly better than she was prior to PT. Even with this, I do feel that the degree of spondylosis in her cervical spine may be a significant pain  and may require medical management. Adherence to HEP has clearly been a positive factor for her, but we are working to not overdo the exercises. With all above in mind as well as efficacy of ibuprofen and cold pack, this PT feels that she would be a great candidate for steroid injection to her neck.    Patient prognosis is Good.      Anticipated barriers to physical therapy: None    Goals: Audrey is working towards her goals.  Short Term Goals: 6 weeks   Patient will report at least 10% disability reduction from initial NDI score to indicate clinically significant functional improvement (goal met)  Patient will report at least 10 point increase on initial FOTO score to indicate clinically significant  functional improvement (goal met)  Patient will report worst pain at end of day no greater than 6/10 to indicate improved functional tolerance (goal met)        Long Term Goals: 12 weeks   Patient will report at least 20% disability reduction from initial NDI score to indicate clinically significant functional improvement (goal met)  Patient will report at least 20 point increase on initial FOTO score to indicate clinically significant functional improvement (goal met)  Patient will report worst pain at end of day no greater than 4/10 to indicate improved functional tolerance    Plan     2-3x/week x 12 weeks    Christopher Martinez, PT

## 2023-09-06 ENCOUNTER — CLINICAL SUPPORT (OUTPATIENT)
Dept: REHABILITATION | Facility: HOSPITAL | Age: 68
End: 2023-09-06
Payer: MEDICARE

## 2023-09-06 DIAGNOSIS — Z74.09 IMPAIRED FUNCTIONAL MOBILITY AND ACTIVITY TOLERANCE: ICD-10-CM

## 2023-09-06 DIAGNOSIS — M54.2 CERVICALGIA: Primary | ICD-10-CM

## 2023-09-06 PROCEDURE — 97140 MANUAL THERAPY 1/> REGIONS: CPT

## 2023-09-06 PROCEDURE — 97110 THERAPEUTIC EXERCISES: CPT

## 2023-09-06 NOTE — PLAN OF CARE
Physical Therapy Treatment Note     Name: Audrey Braxton  Clinic Number: [id number]    Therapy Diagnosis:   Encounter Diagnoses   Name Primary?    Cervicalgia Yes    Impaired functional mobility and activity tolerance      Physician: Anders Jon MD    Visit Date: 9/6/2023    Physician Orders: PT Eval and Treat  Medical Diagnosis from Referral: Calcification of intervertebral cartilage or disc of cervical region, Degeneration of intervertebral disc at C6-C7 level  Evaluation Date: 8/9/2023  Authorization Period Expiration: 10/08/23  Plan of Care Expiration: 11/9/2023  Visit # / Visits authorized: 11/ 24     Time In: 1342  Time Out: 1429  Total Billable Time: 47 minutes     Surgery: None  Orthopedic Precautions: None  Pertinent History: History of low back pain, moderate-significant scoliosis (cervical, thoracic, lumbar)    Subjective     Patient reports: 2/10 pain largely maintained since previous session, gradually increased and varied between 2-4/10. This morning 5/10 pain which she currently has.    CMS Impairment/Limitation/Restriction for FOTO Survey  Therapist reviewed FOTO scores for Audrey Braxton on 8/9/2023. FOTO documents entered into EPIC - see Media section.  Patient's Physical FS Primary Measure: 42  Risk Adjusted Statistical FOTO: 44  Limitation Score: 58%  Category: Mobility  NDI: 50% disability      Therapist reviewed FOTO scores for Audrey Braxton on 8/21/2023. FOTO documents entered into EPIC - see Media section.  Patient's Physical FS Primary Measure: 69  Risk Adjusted Statistical FOTO: 44  Limitation Score: 31%  Category: Mobility  NDI: 12% disability      Objective     Audrey received the following treatment:       Time Activities   Manual 27 min --Cervical manual traction, lateral cervical glides, passive (chin tuck, chin tuck head lift), thoracic PAs (grade 1-5), suboccipital stretch     TherAct  MHP, education   TherEx 20 min Education, seated thoracic flexion, active cervical rotation,  bilateral shoulder band (horz abd, horz abd diag, ER, rows)       E-Stim  [place] Stim to bilateral rhomboids and upper traps - co-contract 5:5 time with scap squeezes, intensity to tolerance  -2-sec ramp up, 50pps    IFC to same area as above, intensity to tolerance  -sweep 80-140Hz           Home Exercises Provided and Patient Education Provided     Education provided:   -Plan of care, HEP, home traction unit, modalities, medication as adjunct to PT, AROM cervical exercises, potential spinal CSI (pain management)    Assessment     Post-session pain 0/10, again with increased crepitus with rotation (suspect increased decompression improved joint play). We added scapular stabilization exercises to help reduce mechanical strain on her neck that may be contributing to cervical compression. Again with multiple cavitations throughout entire thoracic spine, however today with much better spring and mobility on PAs. We are working to increase segmental and physiologic mobility. She has maintained full ROM cervical rotation bilaterally, and with little pain into end range which is significantly better than she was prior to PT. Even with this, I do feel that the degree of spondylosis in her cervical spine may be a significant pain  and may require medical management. Adherence to HEP has clearly been a positive factor for her, but we are working to not overdo the exercises. With all above in mind as well as efficacy of ibuprofen and cold pack, this PT feels that she would be a great candidate for steroid injection to her neck.    Patient prognosis is Good.      Anticipated barriers to physical therapy: None    Goals: Audrey is working towards her goals.  Short Term Goals: 6 weeks   Patient will report at least 10% disability reduction from initial NDI score to indicate clinically significant functional improvement (goal met)  Patient will report at least 10 point increase on initial FOTO score to indicate clinically  significant functional improvement (goal met)  Patient will report worst pain at end of day no greater than 6/10 to indicate improved functional tolerance (goal met)        Long Term Goals: 12 weeks   Patient will report at least 20% disability reduction from initial NDI score to indicate clinically significant functional improvement (goal met)  Patient will report at least 20 point increase on initial FOTO score to indicate clinically significant functional improvement (goal met)  Patient will report worst pain at end of day no greater than 4/10 to indicate improved functional tolerance    Plan     2-3x/week x 12 weeks    Christopher Martinez, PT

## 2023-09-08 ENCOUNTER — CLINICAL SUPPORT (OUTPATIENT)
Dept: REHABILITATION | Facility: HOSPITAL | Age: 68
End: 2023-09-08
Payer: MEDICARE

## 2023-09-08 DIAGNOSIS — M54.2 CERVICALGIA: Primary | ICD-10-CM

## 2023-09-08 DIAGNOSIS — Z74.09 IMPAIRED FUNCTIONAL MOBILITY AND ACTIVITY TOLERANCE: ICD-10-CM

## 2023-09-08 PROCEDURE — 97140 MANUAL THERAPY 1/> REGIONS: CPT

## 2023-09-08 PROCEDURE — 97110 THERAPEUTIC EXERCISES: CPT

## 2023-09-08 NOTE — PLAN OF CARE
Physical Therapy Treatment Note     Name: Audrey Braxton  Clinic Number: [id number]    Therapy Diagnosis:   Encounter Diagnoses   Name Primary?    Cervicalgia Yes    Impaired functional mobility and activity tolerance      Physician: Anders Jon MD    Visit Date: 9/8/2023    Physician Orders: PT Eval and Treat  Medical Diagnosis from Referral: Calcification of intervertebral cartilage or disc of cervical region, Degeneration of intervertebral disc at C6-C7 level  Evaluation Date: 8/9/2023  Authorization Period Expiration: 10/08/23  Plan of Care Expiration: 11/9/2023  Visit # / Visits authorized: 12/ 24     Time In: 1345  Time Out: 1453  Total Billable Time: 68 minutes     Surgery: None  Orthopedic Precautions: None  Pertinent History: History of low back pain, moderate-significant scoliosis (cervical, thoracic, lumbar)    Subjective     Patient reports: 2/10 pain at current. Day of previous session pain increased, next day increased more to a 3/10, but by today now back down to a 2/10.    CMS Impairment/Limitation/Restriction for FOTO Survey  Therapist reviewed FOTO scores for Audrey Braxton on 8/9/2023. FOTO documents entered into EPIC - see Media section.  Patient's Physical FS Primary Measure: 42  Risk Adjusted Statistical FOTO: 44  Limitation Score: 58%  Category: Mobility  NDI: 50% disability      Therapist reviewed FOTO scores for Audrey Braxton on 8/21/2023. FOTO documents entered into EPIC - see Media section.  Patient's Physical FS Primary Measure: 69  Risk Adjusted Statistical FOTO: 44  Limitation Score: 31%  Category: Mobility  NDI: 12% disability      Therapist reviewed FOTO scores for Audrey Braxton on 9/8/2023. FOTO documents entered into EPIC - see Media section.  Patient's Physical FS Primary Measure: 57  Risk Adjusted Statistical FOTO: 44  Limitation Score: 43%  Category: Mobility  NDI: 26% disability      Objective     Audrey received the following treatment:       Time Activities   Manual 27  min --Cervical manual traction, passive (chin tuck, chin tuck head lift), thoracic PAs (grade 1-5), suboccipital stretch, Biofreeze to mid/upper back and neck     TherAct  MHP, education   TherEx 41 min Education, seated thoracic flexion, active cervical rotation, bilateral shoulder band (horz abd, horz abd diag, ER, rows), cold pack to neck while lying on 1/2 foam roll to t-spine      NuStep       E-Stim  Russian Stim to bilateral rhomboids and upper traps - co-contract 5:5 time with scap squeezes, intensity to tolerance  -2-sec ramp up, 50pps    IFC to same area as above, intensity to tolerance  -sweep 80-140Hz           Home Exercises Provided and Patient Education Provided     Education provided:   -Plan of care, HEP, home traction unit, modalities, medication as adjunct to PT, AROM cervical exercises, potential spinal CSI (pain management)    Assessment     Post-session pain 0/10, again with increased crepitus with rotation (suspect increased decompression improved joint play). With expected soreness to neck, suspect DOMS following newly added therex. As she accommodates to load, should have much less soreness following sessions. We continued scapular stabilization exercises to help reduce mechanical strain on her neck that may be contributing to cervical compression. Again with multiple cavitations throughout entire thoracic spine, however today with much better spring and mobility on PAs. We are working to increase segmental and physiologic mobility. She has maintained full ROM cervical rotation bilaterally, and with little pain into end range which is significantly better than she was prior to PT. Even with this, I do feel that the degree of spondylosis in her cervical spine may be a significant pain  and may require medical management. Adherence to HEP has clearly been a positive factor for her, but we are working to not overdo the exercises. With all above in mind as well as efficacy of ibuprofen and  cold pack, this PT feels that she would be a great candidate for steroid injection to her neck.    Patient prognosis is Good.      Anticipated barriers to physical therapy: None    Goals: Audrey is working towards her goals.  Short Term Goals: 6 weeks   Patient will report at least 10% disability reduction from initial NDI score to indicate clinically significant functional improvement (goal met)  Patient will report at least 10 point increase on initial FOTO score to indicate clinically significant functional improvement (goal met)  Patient will report worst pain at end of day no greater than 6/10 to indicate improved functional tolerance (goal met)        Long Term Goals: 12 weeks   Patient will report at least 20% disability reduction from initial NDI score to indicate clinically significant functional improvement (goal met)  Patient will report at least 20 point increase on initial FOTO score to indicate clinically significant functional improvement (goal met)  Patient will report worst pain at end of day no greater than 4/10 to indicate improved functional tolerance    Plan     2-3x/week x 12 weeks    Christopher Martinez, PT

## 2023-09-11 ENCOUNTER — CLINICAL SUPPORT (OUTPATIENT)
Dept: REHABILITATION | Facility: HOSPITAL | Age: 68
End: 2023-09-11
Payer: MEDICARE

## 2023-09-11 DIAGNOSIS — Z74.09 IMPAIRED FUNCTIONAL MOBILITY AND ACTIVITY TOLERANCE: ICD-10-CM

## 2023-09-11 DIAGNOSIS — M54.2 CERVICALGIA: Primary | ICD-10-CM

## 2023-09-11 PROCEDURE — 97110 THERAPEUTIC EXERCISES: CPT

## 2023-09-11 PROCEDURE — 97140 MANUAL THERAPY 1/> REGIONS: CPT

## 2023-09-11 NOTE — PLAN OF CARE
Physical Therapy Treatment Note     Name: Audrey Braxton  Clinic Number: [id number]    Therapy Diagnosis:   Encounter Diagnoses   Name Primary?    Cervicalgia Yes    Impaired functional mobility and activity tolerance      Physician: Anders Jon MD    Visit Date: 9/11/2023    Physician Orders: PT Eval and Treat  Medical Diagnosis from Referral: Calcification of intervertebral cartilage or disc of cervical region, Degeneration of intervertebral disc at C6-C7 level  Evaluation Date: 8/9/2023  Authorization Period Expiration: 10/08/23  Plan of Care Expiration: 11/9/2023  Visit # / Visits authorized: 13/ 24     Time In: 1345  Time Out: 1432  Total Billable Time: 47 minutes     Surgery: None  Orthopedic Precautions: None  Pertinent History: History of low back pain, moderate-significant scoliosis (cervical, thoracic, lumbar)    Subjective     Patient reports: 1/10 pain left neck, 3/10 pain right neck at current. Over the weekend largely minimal to no pain, today is when the 3/10 pain started. Did cold pack nightly, ibuprofen daily, and minimal cervical rotation exercises. Goes back to neurosurgeon tomorrow.    CMS Impairment/Limitation/Restriction for FOTO Survey  Therapist reviewed FOTO scores for Audrey Braxton on 8/9/2023. FOTO documents entered into EPIC - see Media section.  Patient's Physical FS Primary Measure: 42  Risk Adjusted Statistical FOTO: 44  Limitation Score: 58%  Category: Mobility  NDI: 50% disability      Therapist reviewed FOTO scores for Audrey Braxton on 8/21/2023. FOTO documents entered into EPIC - see Media section.  Patient's Physical FS Primary Measure: 69  Risk Adjusted Statistical FOTO: 44  Limitation Score: 31%  Category: Mobility  NDI: 12% disability      Therapist reviewed FOTO scores for Audrey Braxton on 9/8/2023. FOTO documents entered into EPIC - see Media section.  Patient's Physical FS Primary Measure: 57  Risk Adjusted Statistical FOTO: 44  Limitation Score: 43%  Category:  Mobility  NDI: 26% disability      Objective     Audrey received the following treatment:       Time Activities   Manual 12 min --Cervical manual traction, Biofreeze to mid/upper back and neck     TherAct  MHP, education   TherEx 35 min Education, seated thoracic flexion, bilateral shoulder band (horz abd, horz abd diag, ER, rows, ext), cold pack to neck while lying on 1/2 foam roll to t-spine      NuStep       E-Stim  Russian Stim to bilateral rhomboids and upper traps - co-contract 5:5 time with scap squeezes, intensity to tolerance  -2-sec ramp up, 50pps    IFC to same area as above, intensity to tolerance  -sweep 80-140Hz           Home Exercises Provided and Patient Education Provided     Education provided:   -Plan of care, HEP, home traction unit, modalities, medication as adjunct to PT, AROM cervical exercises, potential spinal CSI (pain management)    Assessment     Post-session pain 0/10, again with increased crepitus with rotation (suspect increased decompression improved joint play). She has seemed to acclimate well to scapular strengthening, as she has maintained low no no pain even with continuity of progressive strengthening. We are continuing scapular stabilization exercises to help reduce mechanical strain on her neck that may be contributing to cervical compression. She has maintained full ROM cervical rotation bilaterally, and with little pain into end range which is significantly better than she was prior to PT. Even with this, I do feel that the degree of spondylosis in her cervical spine may be a significant pain  and may require medical management. Adherence to HEP has clearly been a positive factor for her, but we are working to not overdo the exercises. With all above in mind as well as efficacy of ibuprofen and cold pack, this PT feels that she would be a great candidate for steroid injection to her neck.    Patient prognosis is Good.      Anticipated barriers to physical therapy:  None    Goals: Audrey is working towards her goals.  Short Term Goals: 6 weeks   Patient will report at least 10% disability reduction from initial NDI score to indicate clinically significant functional improvement (goal met)  Patient will report at least 10 point increase on initial FOTO score to indicate clinically significant functional improvement (goal met)  Patient will report worst pain at end of day no greater than 6/10 to indicate improved functional tolerance (goal met)        Long Term Goals: 12 weeks   Patient will report at least 20% disability reduction from initial NDI score to indicate clinically significant functional improvement (goal met)  Patient will report at least 20 point increase on initial FOTO score to indicate clinically significant functional improvement (goal met)  Patient will report worst pain at end of day no greater than 4/10 to indicate improved functional tolerance    Plan     2-3x/week x 12 weeks    Christopher Martinez, PT

## 2023-09-13 ENCOUNTER — CLINICAL SUPPORT (OUTPATIENT)
Dept: REHABILITATION | Facility: HOSPITAL | Age: 68
End: 2023-09-13
Payer: MEDICARE

## 2023-09-13 DIAGNOSIS — M54.2 CERVICALGIA: Primary | ICD-10-CM

## 2023-09-13 DIAGNOSIS — Z74.09 IMPAIRED FUNCTIONAL MOBILITY AND ACTIVITY TOLERANCE: ICD-10-CM

## 2023-09-13 PROCEDURE — 97140 MANUAL THERAPY 1/> REGIONS: CPT

## 2023-09-13 PROCEDURE — 97110 THERAPEUTIC EXERCISES: CPT

## 2023-09-13 RX ORDER — MONTELUKAST SODIUM 10 MG/1
10 TABLET ORAL DAILY
Qty: 30 TABLET | Refills: 11 | Status: SHIPPED | OUTPATIENT
Start: 2023-09-13 | End: 2024-03-06 | Stop reason: SDUPTHER

## 2023-09-13 NOTE — PLAN OF CARE
Physical Therapy Treatment Note     Name: Audrey Braxton  Clinic Number: [id number]    Therapy Diagnosis:   Encounter Diagnoses   Name Primary?    Cervicalgia Yes    Impaired functional mobility and activity tolerance      Physician: Anders Jon MD    Visit Date: 9/13/2023    Physician Orders: PT Eval and Treat  Medical Diagnosis from Referral: Calcification of intervertebral cartilage or disc of cervical region, Degeneration of intervertebral disc at C6-C7 level  Evaluation Date: 8/9/2023  Authorization Period Expiration: 10/08/23  Plan of Care Expiration: 11/9/2023  Visit # / Visits authorized: 14/ 24     Time In: 1333  Time Out: 1426  Total Billable Time: 53 minutes     Surgery: None  Orthopedic Precautions: None  Pertinent History: History of low back pain, moderate-significant scoliosis (cervical, thoracic, lumbar)    Subjective     Patient reports: 4/10 pain neck, got up to a 6/10 yesterday. Saw MD yesterday, was told to follow up in 3 months.    CMS Impairment/Limitation/Restriction for FOTO Survey  Therapist reviewed FOTO scores for Audrey Braxton on 8/9/2023. FOTO documents entered into EPIC - see Media section.  Patient's Physical FS Primary Measure: 42  Risk Adjusted Statistical FOTO: 44  Limitation Score: 58%  Category: Mobility  NDI: 50% disability      Therapist reviewed FOTO scores for Audrey Braxton on 8/21/2023. FOTO documents entered into EPIC - see Media section.  Patient's Physical FS Primary Measure: 69  Risk Adjusted Statistical FOTO: 44  Limitation Score: 31%  Category: Mobility  NDI: 12% disability      Therapist reviewed FOTO scores for Audrey Braxton on 9/8/2023. FOTO documents entered into EPIC - see Media section.  Patient's Physical FS Primary Measure: 57  Risk Adjusted Statistical FOTO: 44  Limitation Score: 43%  Category: Mobility  NDI: 26% disability      Objective     Audrey received the following treatment:       Time Activities   Manual 23 min --Cervical manual traction,  Biofreeze to mid/upper back and neck, right neck stretch (upper traps, levator, scalenes), STM right neck and upper traps     TherAct  MHP, education   TherEx 30 min Education, seated thoracic flexion, bilateral shoulder band (horz abd, horz abd diag, ER, rows, ext), adduction      NuStep       E-Stim  Russian Stim to bilateral rhomboids and upper traps - co-contract 5:5 time with scap squeezes, intensity to tolerance  -2-sec ramp up, 50pps    IFC to same area as above, intensity to tolerance  -sweep 80-140Hz           Home Exercises Provided and Patient Education Provided     Education provided:   -Plan of care, HEP, home traction unit, modalities, medication as adjunct to PT, AROM cervical exercises, potential spinal CSI (pain management)    Assessment     Post-session pain 0/10, again with increased crepitus with rotation (suspect increased decompression improved joint play). She has seemed to acclimate well to scapular strengthening, as she has maintained low no no pain even with continuity of progressive strengthening. We are continuing scapular stabilization exercises to help reduce mechanical strain on her neck that may be contributing to cervical compression. She has maintained full ROM cervical rotation bilaterally, and with little pain into end range which is significantly better than she was prior to PT. Even with this, I do feel that the degree of spondylosis in her cervical spine may be a significant pain  and may require medical management. Adherence to HEP has clearly been a positive factor for her, but we are working to not overdo the exercises. With all above in mind as well as efficacy of ibuprofen and cold pack, this PT feels that she would be a great candidate for steroid injection to her neck.    Patient prognosis is Good.      Anticipated barriers to physical therapy: None    Goals: Audrey is working towards her goals.  Short Term Goals: 6 weeks   Patient will report at least 10%  disability reduction from initial NDI score to indicate clinically significant functional improvement (goal met)  Patient will report at least 10 point increase on initial FOTO score to indicate clinically significant functional improvement (goal met)  Patient will report worst pain at end of day no greater than 6/10 to indicate improved functional tolerance (goal met)        Long Term Goals: 12 weeks   Patient will report at least 20% disability reduction from initial NDI score to indicate clinically significant functional improvement (goal met)  Patient will report at least 20 point increase on initial FOTO score to indicate clinically significant functional improvement (goal met)  Patient will report worst pain at end of day no greater than 4/10 to indicate improved functional tolerance    Plan     2-3x/week x 12 weeks    Christopher Martinez, PT

## 2023-09-15 ENCOUNTER — CLINICAL SUPPORT (OUTPATIENT)
Dept: REHABILITATION | Facility: HOSPITAL | Age: 68
End: 2023-09-15
Payer: MEDICARE

## 2023-09-15 DIAGNOSIS — M54.2 CERVICALGIA: Primary | ICD-10-CM

## 2023-09-15 DIAGNOSIS — Z74.09 IMPAIRED FUNCTIONAL MOBILITY AND ACTIVITY TOLERANCE: ICD-10-CM

## 2023-09-15 PROCEDURE — 97140 MANUAL THERAPY 1/> REGIONS: CPT

## 2023-09-15 PROCEDURE — 97110 THERAPEUTIC EXERCISES: CPT

## 2023-09-15 NOTE — PLAN OF CARE
Physical Therapy Treatment Note     Name: Audrey Braxton  Clinic Number: [id number]    Therapy Diagnosis:   Encounter Diagnoses   Name Primary?    Cervicalgia Yes    Impaired functional mobility and activity tolerance      Physician: Anders Jon MD    Visit Date: 9/15/2023    Physician Orders: PT Eval and Treat  Medical Diagnosis from Referral: Calcification of intervertebral cartilage or disc of cervical region, Degeneration of intervertebral disc at C6-C7 level  Evaluation Date: 8/9/2023  Authorization Period Expiration: 10/08/23  Plan of Care Expiration: 11/9/2023  Visit # / Visits authorized: 15/ 24     Time In: 0919  Time Out: 1018  Total Billable Time: 59 minutes     Surgery: None  Orthopedic Precautions: None  Pertinent History: History of low back pain, moderate-significant scoliosis (cervical, thoracic, lumbar)    Subjective     Patient reports: 3/10 pain neck, got up to a 6/10 yesterday. No other significant changes.    CMS Impairment/Limitation/Restriction for FOTO Survey  Therapist reviewed FOTO scores for Audrey Braxton on 8/9/2023. FOTO documents entered into EPIC - see Media section.  Patient's Physical FS Primary Measure: 42  Risk Adjusted Statistical FOTO: 44  Limitation Score: 58%  Category: Mobility  NDI: 50% disability      Therapist reviewed FOTO scores for Audrey Braxton on 8/21/2023. FOTO documents entered into EPIC - see Media section.  Patient's Physical FS Primary Measure: 69  Risk Adjusted Statistical FOTO: 44  Limitation Score: 31%  Category: Mobility  NDI: 12% disability      Therapist reviewed FOTO scores for Audrey Braxton on 9/8/2023. FOTO documents entered into EPIC - see Media section.  Patient's Physical FS Primary Measure: 57  Risk Adjusted Statistical FOTO: 44  Limitation Score: 43%  Category: Mobility  NDI: 26% disability      Objective     Audrey received the following treatment:       Time Activities   Manual 11 min --Cervical manual traction, Biofreeze to mid/upper back  and neck, thoracic central PAs (grades 1-5), STM neck and upper traps     TherAct  MHP, education   TherEx 48 min Education, bilateral shoulder band (horz abd, horz abd diag, ER, rows, ext, adduction), serratus wall flexion, IFC to neck/left scapula (with cold pack to neck)      NuStep       E-Stim  Russian Stim to bilateral rhomboids and upper traps - co-contract 5:5 time with scap squeezes, intensity to tolerance  -2-sec ramp up, 50pps    IFC to same area as above, intensity to tolerance  -sweep 80-140Hz           Home Exercises Provided and Patient Education Provided     Education provided:   -Plan of care, HEP, home traction unit, modalities, medication as adjunct to PT, AROM cervical exercises, potential spinal CSI (pain management)    Assessment     Post-session pain 0-1/10, again with increased crepitus with rotation (suspect increased decompression improved joint play). She has seemed to acclimate well to scapular strengthening, as she has maintained low no no pain even with continuity of progressive strengthening. Elina is soreness the day after exercise, with resolution by day 2. We are continuing scapular stabilization exercises to help reduce mechanical strain on her neck that may be contributing to cervical compression.    She has maintained full ROM cervical rotation bilaterally, and with little pain into end range which is significantly better than she was prior to PT. Even with this, I do feel that the degree of spondylosis in her cervical spine may be a significant pain  and may require medical management. Adherence to HEP has clearly been a positive factor for her, but we are working to not overdo the exercises. With all above in mind as well as efficacy of ibuprofen and cold pack, this PT feels that she would be a great candidate for steroid injection to her neck.    Patient prognosis is Good.      Anticipated barriers to physical therapy: None    Goals: Audrey is working towards her  goals.  Short Term Goals: 6 weeks   Patient will report at least 10% disability reduction from initial NDI score to indicate clinically significant functional improvement (goal met)  Patient will report at least 10 point increase on initial FOTO score to indicate clinically significant functional improvement (goal met)  Patient will report worst pain at end of day no greater than 6/10 to indicate improved functional tolerance (goal met)        Long Term Goals: 12 weeks   Patient will report at least 20% disability reduction from initial NDI score to indicate clinically significant functional improvement (goal met)  Patient will report at least 20 point increase on initial FOTO score to indicate clinically significant functional improvement (goal met)  Patient will report worst pain at end of day no greater than 4/10 to indicate improved functional tolerance    Plan     2-3x/week x 12 weeks    Christopher Martinez, PT

## 2023-09-18 ENCOUNTER — CLINICAL SUPPORT (OUTPATIENT)
Dept: REHABILITATION | Facility: HOSPITAL | Age: 68
End: 2023-09-18
Payer: MEDICARE

## 2023-09-18 DIAGNOSIS — M54.2 CERVICALGIA: Primary | ICD-10-CM

## 2023-09-18 DIAGNOSIS — Z74.09 IMPAIRED FUNCTIONAL MOBILITY AND ACTIVITY TOLERANCE: ICD-10-CM

## 2023-09-18 PROCEDURE — 97530 THERAPEUTIC ACTIVITIES: CPT

## 2023-09-18 PROCEDURE — 97140 MANUAL THERAPY 1/> REGIONS: CPT

## 2023-09-18 NOTE — PLAN OF CARE
Physical Therapy Treatment Note     Name: Audrey Braxton  Clinic Number: [id number]    Therapy Diagnosis:   Encounter Diagnoses   Name Primary?    Cervicalgia Yes    Impaired functional mobility and activity tolerance      Physician: Anders Jon MD    Visit Date: 9/18/2023    Physician Orders: PT Eval and Treat  Medical Diagnosis from Referral: Calcification of intervertebral cartilage or disc of cervical region, Degeneration of intervertebral disc at C6-C7 level  Evaluation Date: 8/9/2023  Authorization Period Expiration: 10/08/23  Plan of Care Expiration: 11/9/2023  Visit # / Visits authorized: 16/ 24     Time In: 1342  Time Out: 1423  Total Billable Time: 41 minutes     Surgery: None  Orthopedic Precautions: None  Pertinent History: History of low back pain, moderate-significant scoliosis (cervical, thoracic, lumbar)    Subjective     Patient reports: 3/10 pain neck. On Sunday 0/10 pain until night.    CMS Impairment/Limitation/Restriction for FOTO Survey  Therapist reviewed FOTO scores for Audrey Braxton on 8/9/2023. FOTO documents entered into EP[employer] - see Media section.  Patient's Physical FS Primary Measure: 42  Risk Adjusted Statistical FOTO: 44  Limitation Score: 58%  Category: Mobility  NDI: 50% disability      Therapist reviewed FOTO scores for Audrey Braxton on 8/21/2023. FOTO documents entered into EPIC - see Media section.  Patient's Physical FS Primary Measure: 69  Risk Adjusted Statistical FOTO: 44  Limitation Score: 31%  Category: Mobility  NDI: 12% disability      Therapist reviewed FOTO scores for Audrey Braxton on 9/8/2023. FOTO documents entered into EP[employer] - see Media section.  Patient's Physical FS Primary Measure: 57  Risk Adjusted Statistical FOTO: 44  Limitation Score: 43%  Category: Mobility  NDI: 26% disability    Objective     Audrey received the following treatment:       Time Activities   Manual 13 min --Cervical manual traction, Biofreeze to mid/upper back and neck     TherAct 28 min  MHP, education, NuStep   TherEx  Education, bilateral shoulder band (horz abd, horz abd diag, ER, rows, ext, adduction), serratus wall flexion, IFC to neck/left scapula (with cold pack to neck)      NuStep       E-Stim  Russian Stim to bilateral rhomboids and upper traps - co-contract 5:5 time with scap squeezes, intensity to tolerance  -2-sec ramp up, 50pps    IFC to same area as above, intensity to tolerance  -sweep 80-140Hz           Home Exercises Provided and Patient Education Provided     Education provided:   -Plan of care, HEP, home traction unit, modalities, medication as adjunct to PT, AROM cervical exercises, potential spinal CSI (pain management)    Assessment     Post-session pain 2/10, again with increased crepitus with rotation (suspect increased decompression improved joint play). She has seemed to acclimate well to scapular strengthening, as she has maintained low no no pain even with continuity of progressive strengthening. Elina is soreness the day after exercise, with resolution by day 2. We are continuing scapular stabilization exercises to help reduce mechanical strain on her neck that may be contributing to cervical compression.    She has maintained full ROM cervical rotation bilaterally, and with little pain into end range which is significantly better than she was prior to PT. Even with this, I do feel that the degree of spondylosis in her cervical spine may be a significant pain  and may require medical management. Adherence to HEP has clearly been a positive factor for her, but we are working to not overdo the exercises. With all above in mind as well as efficacy of ibuprofen and cold pack, this PT feels that she would be a great candidate for steroid injection to her neck.    Patient prognosis is Good.      Anticipated barriers to physical therapy: None    Goals: Audrey is working towards her goals.  Short Term Goals: 6 weeks   Patient will report at least 10% disability reduction  from initial NDI score to indicate clinically significant functional improvement (goal met)  Patient will report at least 10 point increase on initial FOTO score to indicate clinically significant functional improvement (goal met)  Patient will report worst pain at end of day no greater than 6/10 to indicate improved functional tolerance (goal met)        Long Term Goals: 12 weeks   Patient will report at least 20% disability reduction from initial NDI score to indicate clinically significant functional improvement (goal met)  Patient will report at least 20 point increase on initial FOTO score to indicate clinically significant functional improvement (goal met)  Patient will report worst pain at end of day no greater than 4/10 to indicate improved functional tolerance    Plan     2-3x/week x 12 weeks    Christopher Martinez, PT

## 2023-09-20 ENCOUNTER — CLINICAL SUPPORT (OUTPATIENT)
Dept: REHABILITATION | Facility: HOSPITAL | Age: 68
End: 2023-09-20
Payer: MEDICARE

## 2023-09-20 DIAGNOSIS — M54.2 CERVICALGIA: Primary | ICD-10-CM

## 2023-09-20 DIAGNOSIS — Z74.09 IMPAIRED FUNCTIONAL MOBILITY AND ACTIVITY TOLERANCE: ICD-10-CM

## 2023-09-20 PROCEDURE — 97110 THERAPEUTIC EXERCISES: CPT

## 2023-09-20 NOTE — PLAN OF CARE
Physical Therapy Treatment Note     Name: Audrey Braxton  Clinic Number: [id number]    Therapy Diagnosis:   Encounter Diagnoses   Name Primary?    Cervicalgia Yes    Impaired functional mobility and activity tolerance      Physician: Anders Jon MD    Visit Date: 9/20/2023    Physician Orders: PT Eval and Treat  Medical Diagnosis from Referral: Calcification of intervertebral cartilage or disc of cervical region, Degeneration of intervertebral disc at C6-C7 level  Evaluation Date: 8/9/2023  Authorization Period Expiration: 10/08/23  Plan of Care Expiration: 11/9/2023  Visit # / Visits authorized: 17/ 24     Time In: 1332  Time Out: 1410  Total Billable Time: 38 minutes     Surgery: None  Orthopedic Precautions: None  Pertinent History: History of low back pain, moderate-significant scoliosis (cervical, thoracic, lumbar)    Subjective     Patient reports: 1/10 pain neck, has been this since past 2 days. When she left PT last it was a 4/10, she started using her postural strap (to reduce rounded shoulders and kyphosis), continued with ice and Biofreeze, and pain went down to 1/10.    CMS Impairment/Limitation/Restriction for FOTO Survey  Therapist reviewed FOTO scores for Audrey Braxton on 8/9/2023. FOTO documents entered into EPIC - see Media section.  Patient's Physical FS Primary Measure: 42  Risk Adjusted Statistical FOTO: 44  Limitation Score: 58%  Category: Mobility  NDI: 50% disability      Therapist reviewed FOTO scores for Audrey Braxton on 8/21/2023. FOTO documents entered into EPIC - see Media section.  Patient's Physical FS Primary Measure: 69  Risk Adjusted Statistical FOTO: 44  Limitation Score: 31%  Category: Mobility  NDI: 12% disability      Therapist reviewed FOTO scores for Audrey Braxton on 9/8/2023. FOTO documents entered into EPIC - see Media section.  Patient's Physical FS Primary Measure: 57  Risk Adjusted Statistical FOTO: 44  Limitation Score: 43%  Category: Mobility  NDI: 26%  disability      Therapist reviewed FOTO scores for Audrey Braxton on 9/20/2023. FOTO documents entered into [email or web address] - see Media section.  Patient's Physical FS Primary Measure: 56  Risk Adjusted Statistical FOTO: 44  Limitation Score: 44%  Category: Mobility  NDI: 28% disability      Objective     Audrey received the following treatment:       Time Activities   Manual  --Cervical manual traction, Biofreeze to mid/upper back and neck     TherAct  MHP, education, NuStep   TherEx 38 min Education, NuStep, HEP     E-Stim  Russian Stim to bilateral rhomboids and upper traps - co-contract 5:5 time with scap squeezes, intensity to tolerance  -2-sec ramp up, 50pps    IFC to same area as above, intensity to tolerance  -sweep 80-140Hz           Home Exercises Provided and Patient Education Provided     Education provided:   -Plan of care  -HEP -- printed, given, and reviewed on 9/20/23; updated to media section of EMR  -therabands (yellow, red, green) given 9/20/23    Assessment     Post-session pain 1/10. She has seemed to acclimate well to scapular strengthening, as she has maintained low no no pain even with continuity of progressive strengthening. Pattern is soreness the day after exercise, with resolution by day 2. We will transition to independent HEP for scapular stabilization exercises to continue reducing mechanical strain on her neck that may be contributing to cervical compression. She has also maintained adequate pain control methods including postural correction, modalities, and medication.    She has maintained full ROM cervical rotation bilaterally, and with little pain into end range which is significantly better than she was prior to PT. Even with this, I do feel that the degree of spondylosis in her cervical spine may be a significant pain  and may require medical management. Adherence to HEP has clearly been a positive factor for her, but we are working to not overdo the exercises. With all above in mind  as well as efficacy of ibuprofen and cold pack, this PT feels that she would be a great candidate for steroid injection to her neck.    Patient prognosis is Good.      Anticipated barriers to physical therapy: None    Goals: Audrey is working towards her goals.  Short Term Goals: 6 weeks   Patient will report at least 10% disability reduction from initial NDI score to indicate clinically significant functional improvement (goal met)  Patient will report at least 10 point increase on initial FOTO score to indicate clinically significant functional improvement (goal met)  Patient will report worst pain at end of day no greater than 6/10 to indicate improved functional tolerance (goal met)        Long Term Goals: 12 weeks   Patient will report at least 20% disability reduction from initial NDI score to indicate clinically significant functional improvement (goal met)  Patient will report at least 20 point increase on initial FOTO score to indicate clinically significant functional improvement (goal met)  Patient will report worst pain at end of day no greater than 4/10 to indicate improved functional tolerance (goal met)    Plan     2-3x/week x 12 weeks    Christopher Martinez, PT

## 2023-09-22 ENCOUNTER — CLINICAL SUPPORT (OUTPATIENT)
Dept: REHABILITATION | Facility: HOSPITAL | Age: 68
End: 2023-09-22
Payer: MEDICARE

## 2023-09-22 DIAGNOSIS — Z74.09 IMPAIRED FUNCTIONAL MOBILITY AND ACTIVITY TOLERANCE: ICD-10-CM

## 2023-09-22 DIAGNOSIS — M54.2 CERVICALGIA: Primary | ICD-10-CM

## 2023-09-22 PROCEDURE — 97140 MANUAL THERAPY 1/> REGIONS: CPT

## 2023-09-22 PROCEDURE — 97110 THERAPEUTIC EXERCISES: CPT

## 2023-09-22 NOTE — PLAN OF CARE
Physical Therapy Treatment Note     Name: Audrey Braxton  Clinic Number: [id number]    Therapy Diagnosis:   Encounter Diagnoses   Name Primary?    Cervicalgia Yes    Impaired functional mobility and activity tolerance      Physician: Anders Jon MD    Visit Date: 9/22/2023    Physician Orders: PT Eval and Treat  Medical Diagnosis from Referral: Calcification of intervertebral cartilage or disc of cervical region, Degeneration of intervertebral disc at C6-C7 level  Evaluation Date: 8/9/2023  Authorization Period Expiration: 10/08/23  Plan of Care Expiration: 11/9/2023  Visit # / Visits authorized: 18/ 24     Time In: 1251  Time Out: 1317  Total Billable Time: 26 minutes     Surgery: None  Orthopedic Precautions: None  Pertinent History: History of low back pain, moderate-significant scoliosis (cervical, thoracic, lumbar)    Subjective     Patient reports: 3/10 pain neck. 4/10 yesterday, but as she continues using her postural strap the pain reduces.    CMS Impairment/Limitation/Restriction for FOTO Survey  Therapist reviewed FOTO scores for Audrey Braxton on 8/9/2023. FOTO documents entered into EPIC - see Media section.  Patient's Physical FS Primary Measure: 42  Risk Adjusted Statistical FOTO: 44  Limitation Score: 58%  Category: Mobility  NDI: 50% disability      Therapist reviewed FOTO scores for Audrey Braxton on 8/21/2023. FOTO documents entered into EPIC - see Media section.  Patient's Physical FS Primary Measure: 69  Risk Adjusted Statistical FOTO: 44  Limitation Score: 31%  Category: Mobility  NDI: 12% disability      Therapist reviewed FOTO scores for Audrey Braxton on 9/8/2023. FOTO documents entered into EPIC - see Media section.  Patient's Physical FS Primary Measure: 57  Risk Adjusted Statistical FOTO: 44  Limitation Score: 43%  Category: Mobility  NDI: 26% disability      Therapist reviewed FOTO scores for Audrey Braxton on 9/20/2023. FOTO documents entered into EPIC - see Media section.  Patient's  Physical FS Primary Measure: 56  Risk Adjusted Statistical FOTO: 44  Limitation Score: 44%  Category: Mobility  NDI: 28% disability      Objective     Audrey received the following treatment:       Time Activities   Manual 12 min Cervical manual traction, thoracic PAs with thrust manip    Biofreeze to mid/upper back and neck     TherAct  MHP, education, NuStep   TherEx 14 min Education, NuStep     E-Stim  Russian Stim to bilateral rhomboids and upper traps - co-contract 5:5 time with scap squeezes, intensity to tolerance  -2-sec ramp up, 50pps    IFC to same area as above, intensity to tolerance  -sweep 80-140Hz           Home Exercises Provided and Patient Education Provided     Education provided:   -Plan of care, postural support brace, home maintenance      Assessment     Post-session pain 0/10. She has seemed to acclimate well to scapular strengthening, as she has maintained low no no pain even with continuity of progressive strengthening. Pattern is soreness the day after exercise, with resolution by day 2. We will transition to independent HEP for scapular stabilization exercises to continue reducing mechanical strain on her neck that may be contributing to cervical compression. She has also maintained adequate pain control methods including postural correction, modalities, and medication.    She has maintained full ROM cervical rotation bilaterally, and with little pain into end range which is significantly better than she was prior to PT. Even with this, I do feel that the degree of spondylosis in her cervical spine may be a significant pain  and may require medical management. Adherence to HEP has clearly been a positive factor for her, but we are working to not overdo the exercises. With all above in mind as well as efficacy of ibuprofen and cold pack, this PT feels that she would be a great candidate for steroid injection to her neck.    Patient prognosis is Good.      Anticipated barriers to  physical therapy: None    Goals: Audrey is working towards her goals.  Short Term Goals: 6 weeks   Patient will report at least 10% disability reduction from initial NDI score to indicate clinically significant functional improvement (goal met)  Patient will report at least 10 point increase on initial FOTO score to indicate clinically significant functional improvement (goal met)  Patient will report worst pain at end of day no greater than 6/10 to indicate improved functional tolerance (goal met)        Long Term Goals: 12 weeks   Patient will report at least 20% disability reduction from initial NDI score to indicate clinically significant functional improvement (goal met)  Patient will report at least 20 point increase on initial FOTO score to indicate clinically significant functional improvement (goal met)  Patient will report worst pain at end of day no greater than 4/10 to indicate improved functional tolerance (goal met)    Plan     Discharge to [facility]    Christopher Martinez, PT

## 2023-11-02 DIAGNOSIS — E78.5 HYPERLIPIDEMIA, UNSPECIFIED HYPERLIPIDEMIA TYPE: ICD-10-CM

## 2023-11-02 RX ORDER — PITAVASTATIN CALCIUM 2.09 MG/1
2 TABLET, FILM COATED ORAL DAILY
Qty: 30 TABLET | Refills: 2 | Status: SHIPPED | OUTPATIENT
Start: 2023-11-02 | End: 2024-03-06 | Stop reason: SDUPTHER

## 2024-01-10 ENCOUNTER — HOSPITAL ENCOUNTER (OUTPATIENT)
Dept: RADIOLOGY | Facility: HOSPITAL | Age: 69
Discharge: HOME | End: 2024-01-10
Attending: NEUROLOGICAL SURGERY
Payer: MEDICARE

## 2024-01-10 DIAGNOSIS — D78.01 INTRAOPERATIVE HEMORRHAGE AND HEMATOMA OF SPLEEN COMPLICATING PROCEDURE ON SPLEEN: Primary | ICD-10-CM

## 2024-01-10 DIAGNOSIS — Z01.811 PRE-OP CHEST EXAM: ICD-10-CM

## 2024-01-10 PROCEDURE — 71046 X-RAY EXAM CHEST 2 VIEWS: CPT | Mod: TC

## 2024-03-06 DIAGNOSIS — E78.5 HYPERLIPIDEMIA, UNSPECIFIED HYPERLIPIDEMIA TYPE: ICD-10-CM

## 2024-03-06 RX ORDER — MONTELUKAST SODIUM 10 MG/1
10 TABLET ORAL DAILY
Qty: 30 TABLET | Refills: 5 | Status: SHIPPED | OUTPATIENT
Start: 2024-03-06

## 2024-03-06 RX ORDER — PITAVASTATIN CALCIUM 2.09 MG/1
2 TABLET, FILM COATED ORAL DAILY
Qty: 30 TABLET | Refills: 5 | Status: SHIPPED | OUTPATIENT
Start: 2024-03-06

## 2024-03-13 ENCOUNTER — CLINICAL SUPPORT (OUTPATIENT)
Dept: FAMILY MEDICINE | Facility: CLINIC | Age: 69
End: 2024-03-13
Payer: MEDICARE

## 2024-03-13 DIAGNOSIS — F32.A DEPRESSION, UNSPECIFIED DEPRESSION TYPE: ICD-10-CM

## 2024-03-13 DIAGNOSIS — F41.9 ANXIETY: ICD-10-CM

## 2024-03-13 DIAGNOSIS — M79.2 PERIPHERAL NEUROPATHIC PAIN: ICD-10-CM

## 2024-03-13 DIAGNOSIS — E78.5 HYPERLIPIDEMIA, UNSPECIFIED HYPERLIPIDEMIA TYPE: ICD-10-CM

## 2024-03-13 DIAGNOSIS — M19.90 ARTHRITIS: ICD-10-CM

## 2024-03-13 LAB
ALBUMIN SERPL-MCNC: 3.9 G/DL (ref 3.4–4.8)
ALBUMIN/GLOB SERPL: 1.4 RATIO (ref 1.1–2)
ALP SERPL-CCNC: 75 UNIT/L (ref 40–150)
ALT SERPL-CCNC: 37 UNIT/L (ref 0–55)
APPEARANCE UR: ABNORMAL
AST SERPL-CCNC: 30 UNIT/L (ref 5–34)
BACTERIA #/AREA URNS AUTO: ABNORMAL /HPF
BASOPHILS # BLD AUTO: 0.03 X10(3)/MCL
BASOPHILS NFR BLD AUTO: 0.7 %
BILIRUB SERPL-MCNC: 0.7 MG/DL
BILIRUB UR QL STRIP.AUTO: NEGATIVE
BUN SERPL-MCNC: 27 MG/DL (ref 9.8–20.1)
CALCIUM SERPL-MCNC: 9.3 MG/DL (ref 8.4–10.2)
CAOX CRY URNS QL MICRO: ABNORMAL /HPF
CHLORIDE SERPL-SCNC: 108 MMOL/L (ref 98–107)
CHOLEST SERPL-MCNC: 181 MG/DL
CHOLEST/HDLC SERPL: 3 {RATIO} (ref 0–5)
CO2 SERPL-SCNC: 24 MMOL/L (ref 23–31)
COLOR UR AUTO: YELLOW
CREAT SERPL-MCNC: 0.76 MG/DL (ref 0.55–1.02)
EOSINOPHIL # BLD AUTO: 0.23 X10(3)/MCL (ref 0–0.9)
EOSINOPHIL NFR BLD AUTO: 5.3 %
ERYTHROCYTE [DISTWIDTH] IN BLOOD BY AUTOMATED COUNT: 13.1 % (ref 11.5–17)
EST. AVERAGE GLUCOSE BLD GHB EST-MCNC: 102.5 MG/DL
GFR SERPLBLD CREATININE-BSD FMLA CKD-EPI: >60 MLS/MIN/1.73/M2
GLOBULIN SER-MCNC: 2.7 GM/DL (ref 2.4–3.5)
GLUCOSE SERPL-MCNC: 88 MG/DL (ref 82–115)
GLUCOSE UR QL STRIP.AUTO: NEGATIVE
HBA1C MFR BLD: 5.2 %
HCT VFR BLD AUTO: 43.1 % (ref 37–47)
HDLC SERPL-MCNC: 65 MG/DL (ref 35–60)
HGB BLD-MCNC: 14 G/DL (ref 12–16)
IMM GRANULOCYTES # BLD AUTO: 0 X10(3)/MCL (ref 0–0.04)
IMM GRANULOCYTES NFR BLD AUTO: 0 %
KETONES UR QL STRIP.AUTO: ABNORMAL
LDLC SERPL CALC-MCNC: 104 MG/DL (ref 50–140)
LEUKOCYTE ESTERASE UR QL STRIP.AUTO: NEGATIVE
LYMPHOCYTES # BLD AUTO: 1.77 X10(3)/MCL (ref 0.6–4.6)
LYMPHOCYTES NFR BLD AUTO: 40.6 %
MCH RBC QN AUTO: 32.1 PG (ref 27–31)
MCHC RBC AUTO-ENTMCNC: 32.5 G/DL (ref 33–36)
MCV RBC AUTO: 98.9 FL (ref 80–94)
MONOCYTES # BLD AUTO: 0.33 X10(3)/MCL (ref 0.1–1.3)
MONOCYTES NFR BLD AUTO: 7.6 %
MUCOUS THREADS URNS QL MICRO: ABNORMAL /LPF
NEUTROPHILS # BLD AUTO: 2 X10(3)/MCL (ref 2.1–9.2)
NEUTROPHILS NFR BLD AUTO: 45.8 %
NITRITE UR QL STRIP.AUTO: NEGATIVE
NRBC BLD AUTO-RTO: 0 %
PH UR STRIP.AUTO: 5.5 [PH]
PLATELET # BLD AUTO: 225 X10(3)/MCL (ref 130–400)
PMV BLD AUTO: 11.3 FL (ref 7.4–10.4)
POTASSIUM SERPL-SCNC: 4.3 MMOL/L (ref 3.5–5.1)
PROT SERPL-MCNC: 6.6 GM/DL (ref 5.8–7.6)
PROT UR QL STRIP.AUTO: NEGATIVE
RBC # BLD AUTO: 4.36 X10(6)/MCL (ref 4.2–5.4)
RBC #/AREA URNS AUTO: ABNORMAL /HPF
RBC UR QL AUTO: ABNORMAL
SODIUM SERPL-SCNC: 142 MMOL/L (ref 136–145)
SP GR UR STRIP.AUTO: >=1.03 (ref 1–1.03)
SQUAMOUS #/AREA URNS AUTO: ABNORMAL /HPF
TRIGL SERPL-MCNC: 62 MG/DL (ref 37–140)
TSH SERPL-ACNC: 2.04 UIU/ML (ref 0.35–4.94)
UROBILINOGEN UR STRIP-ACNC: 1
VLDLC SERPL CALC-MCNC: 12 MG/DL
WBC # SPEC AUTO: 4.36 X10(3)/MCL (ref 4.5–11.5)
WBC #/AREA URNS AUTO: ABNORMAL /HPF

## 2024-03-13 PROCEDURE — 80053 COMPREHEN METABOLIC PANEL: CPT | Performed by: NURSE PRACTITIONER

## 2024-03-13 PROCEDURE — 83036 HEMOGLOBIN GLYCOSYLATED A1C: CPT | Performed by: NURSE PRACTITIONER

## 2024-03-13 PROCEDURE — 84443 ASSAY THYROID STIM HORMONE: CPT | Performed by: NURSE PRACTITIONER

## 2024-03-13 PROCEDURE — 80061 LIPID PANEL: CPT | Performed by: NURSE PRACTITIONER

## 2024-03-13 PROCEDURE — 36415 COLL VENOUS BLD VENIPUNCTURE: CPT

## 2024-03-13 PROCEDURE — 85025 COMPLETE CBC W/AUTO DIFF WBC: CPT | Performed by: NURSE PRACTITIONER

## 2024-03-13 PROCEDURE — 81003 URINALYSIS AUTO W/O SCOPE: CPT | Performed by: NURSE PRACTITIONER

## 2024-03-27 ENCOUNTER — OFFICE VISIT (OUTPATIENT)
Dept: FAMILY MEDICINE | Facility: CLINIC | Age: 69
End: 2024-03-27
Payer: MEDICARE

## 2024-03-27 VITALS
WEIGHT: 147 LBS | BODY MASS INDEX: 25.1 KG/M2 | HEIGHT: 64 IN | TEMPERATURE: 98 F | DIASTOLIC BLOOD PRESSURE: 65 MMHG | RESPIRATION RATE: 20 BRPM | SYSTOLIC BLOOD PRESSURE: 103 MMHG | HEART RATE: 73 BPM

## 2024-03-27 DIAGNOSIS — L81.9 PIGMENTED SKIN LESION OF UNCERTAIN BEHAVIOR OF HEAD: ICD-10-CM

## 2024-03-27 DIAGNOSIS — F41.9 ANXIETY: ICD-10-CM

## 2024-03-27 DIAGNOSIS — E78.5 HYPERLIPIDEMIA, UNSPECIFIED HYPERLIPIDEMIA TYPE: ICD-10-CM

## 2024-03-27 DIAGNOSIS — R82.90 ABNORMAL URINALYSIS: ICD-10-CM

## 2024-03-27 DIAGNOSIS — M79.2 PERIPHERAL NEUROPATHIC PAIN: ICD-10-CM

## 2024-03-27 DIAGNOSIS — Z12.31 OTHER SCREENING MAMMOGRAM: ICD-10-CM

## 2024-03-27 DIAGNOSIS — M85.89 OSTEOPENIA OF MULTIPLE SITES: ICD-10-CM

## 2024-03-27 PROBLEM — M99.80: Status: ACTIVE | Noted: 2024-03-27

## 2024-03-27 LAB
BILIRUB SERPL-MCNC: NORMAL MG/DL
BLOOD URINE, POC: NORMAL
CLARITY, POC UA: CLEAR
COLOR, POC UA: YELLOW
GLUCOSE UR QL STRIP: NORMAL
KETONES UR QL STRIP: NORMAL
LEUKOCYTE ESTERASE URINE, POC: NORMAL
NITRITE, POC UA: NORMAL
PH, POC UA: 6
PROTEIN, POC: NORMAL
SPECIFIC GRAVITY, POC UA: 1.02
UROBILINOGEN, POC UA: 2

## 2024-03-27 PROCEDURE — 81002 URINALYSIS NONAUTO W/O SCOPE: CPT | Mod: RHCUB | Performed by: NURSE PRACTITIONER

## 2024-03-27 PROCEDURE — 99214 OFFICE O/P EST MOD 30 MIN: CPT | Mod: ,,, | Performed by: NURSE PRACTITIONER

## 2024-03-27 RX ORDER — RALOXIFENE HYDROCHLORIDE 60 MG/1
60 TABLET, FILM COATED ORAL DAILY
Qty: 30 TABLET | Refills: 11 | Status: SHIPPED | OUTPATIENT
Start: 2024-03-27 | End: 2025-03-27

## 2024-03-27 NOTE — PROGRESS NOTES
"Subjective:       Patient ID: Audrey Braxton is a 69 y.o. female.    Chief Complaint: Anxiety (Check up), Hyperlipidemia (Check up), and Results (Lab results)    The patient a 69-year-old female who presents for a checkup.  The patient has a history of the following:  Hyperlipidemia, anxiety, peripheral neuropathic pain, and osteopenia multiple sites.        Review of Systems 12 point review of systems conducted, negative except as stated in the history of present illness. See HPI for details.        Objective:        Visit Vitals  /65   Pulse 73   Temp 98.4 °F (36.9 °C)   Resp 20   Ht 5' 4" (1.626 m)   Wt 66.7 kg (147 lb)   BMI 25.23 kg/m²        Physical Exam  Vitals and nursing note reviewed.   HENT:      Head: Normocephalic.      Nose: Nose normal.      Mouth/Throat:      Mouth: Mucous membranes are moist.   Eyes:      Pupils: Pupils are equal, round, and reactive to light.   Cardiovascular:      Rate and Rhythm: Normal rate and regular rhythm.      Heart sounds: No murmur heard.  Pulmonary:      Effort: Pulmonary effort is normal.      Breath sounds: Normal breath sounds.   Abdominal:      General: Bowel sounds are normal.      Palpations: Abdomen is soft.   Musculoskeletal:         General: Normal range of motion.      Cervical back: Normal range of motion and neck supple.   Skin:     General: Skin is warm and dry.   Neurological:      Mental Status: She is alert and oriented to person, place, and time.           Assessment:           ICD-10-CM ICD-9-CM   1. Hyperlipidemia, unspecified hyperlipidemia type  E78.5 272.4   2. Anxiety  F41.9 300.00   3. Peripheral neuropathic pain  M79.2 729.2   4. Osteopenia of multiple sites  M85.89 733.90   5. Pigmented skin lesion of uncertain behavior of head  L81.9 709.00   6. Abnormal urinalysis  R82.90 791.9   7. Other screening mammogram  Z12.31 V76.12            Plan:         1. Hyperlipidemia, unspecified hyperlipidemia type  Hyperlipidemia  - tolerating " medication  - no myalgia  - watching diet    2. Anxiety    Controlled   Patient sees Psychiatry    3. Peripheral neuropathic pain   Continue gabapentin and duloxetine    4. Osteopenia of multiple sites   Discontinue Fosamax -patient  failed trial  - raloxifene (EVISTA) 60 mg tablet; Take 1 tablet (60 mg total) by mouth once daily.  Dispense: 30 tablet; Refill: 11    5. Pigmented skin lesion of uncertain behavior of head  - Ambulatory referral/consult to Dermatology    6. Abnormal urinalysis  - POCT URINE DIPSTICK WITHOUT MICROSCOPE -normal    7. Other screening mammogram  - Mammo Digital Screening Bilat w/ Cuco; Future          No follow-ups on file.     Future Appointments   Date Time Provider Department Center   3/28/2024  3:00 PM [facility] MAMMO1 SCREEN [facility] MAMMO [facility]        Past Medical History:   Diagnosis Date    Allergy     Anxiety     Cataract     Depression     Hyperlipidemia         Review of patient's allergies indicates:   Allergen Reactions    Amoxicillin-pot clavulanate Diarrhea        Current Outpatient Medications   Medication Instructions    acetaminophen (TYLENOL) 650 mg, Oral, Daily    ATIVAN 2 mg Tab 2.5 tablets, Oral, Nightly    calcium carbonate-vitamin D3 600 mg-20 mcg (800 unit) Chew 1 tablet, Oral, 2 times daily    cholecalciferol (vitamin D3) 5,000 Units, Oral, Daily    co-enzyme Q-10 50 mg, Oral, Daily    colestipoL (COLESTID) 1 g, Oral, 2 times daily    DULoxetine 20 mg CDRS 1 tablet, Oral, Daily    gabapentin (NEURONTIN) 300 mg, Oral, Nightly    hydrocortisone 2.5 % cream No dose, route, or frequency recorded.    LIVALO 2 mg, Oral, Daily    loratadine (CLARITIN) 10 mg tablet 1 tablet, Oral, Daily    montelukast (SINGULAIR) 10 mg, Oral, Daily    raloxifene (EVISTA) 60 mg, Oral, Daily          Patient Active Problem List   Diagnosis    Rib pain on left side    Post-menopausal    Hyperlipidemia    Anxiety    Left upper quadrant abdominal pain    Acute cystitis without hematuria     Diarrhea    Cough    Lymphadenopathy    Depression    Peripheral neuropathic pain    Arthritis    Right foot pain    Neck pain    Osteopenia of multiple sites    Other screening mammogram    Other biomechanical lesions of head region    Abnormal urine             Past Medical History:   Diagnosis Date    Allergy     Anxiety     Cataract     Depression     Hyperlipidemia           Past Surgical History:   Procedure Laterality Date    CATARACT EXTRACTION      CHOLECYSTECTOMY      EYE SURGERY             reports that she has been smoking cigarettes. She has been exposed to tobacco smoke. She has never used smokeless tobacco. She reports current alcohol use of about 1.0 standard drink of alcohol per week. She reports that she does not use drugs.      There is no immunization history on file for this patient.     Health Maintenance   Topic Date Due    TETANUS VACCINE  Never done    Shingles Vaccine (1 of 2) Never done    Mammogram  06/30/2023    DEXA Scan  06/30/2025    Lipid Panel  03/13/2029    Colorectal Cancer Screening  08/01/2032    Hepatitis C Screening  Completed

## 2024-03-28 ENCOUNTER — HOSPITAL ENCOUNTER (OUTPATIENT)
Dept: RADIOLOGY | Facility: HOSPITAL | Age: 69
Discharge: HOME | End: 2024-03-28
Attending: NURSE PRACTITIONER
Payer: MEDICARE

## 2024-03-28 DIAGNOSIS — Z12.31 OTHER SCREENING MAMMOGRAM: ICD-10-CM

## 2024-03-28 PROCEDURE — 77067 SCR MAMMO BI INCL CAD: CPT | Mod: 26,,, | Performed by: RADIOLOGY

## 2024-03-28 PROCEDURE — 77063 BREAST TOMOSYNTHESIS BI: CPT | Mod: TC

## 2024-03-28 PROCEDURE — 77063 BREAST TOMOSYNTHESIS BI: CPT | Mod: 26,,, | Performed by: RADIOLOGY

## 2024-04-26 DIAGNOSIS — R92.8 ABNORMAL MAMMOGRAM: Primary | ICD-10-CM

## 2024-05-08 DIAGNOSIS — M79.671 RIGHT FOOT PAIN: ICD-10-CM

## 2024-05-08 DIAGNOSIS — M19.90 ARTHRITIS: ICD-10-CM

## 2024-05-08 RX ORDER — GABAPENTIN 300 MG/1
300 CAPSULE ORAL
Qty: 90 CAPSULE | Refills: 1 | Status: SHIPPED | OUTPATIENT
Start: 2024-05-08

## 2024-06-04 ENCOUNTER — HOSPITAL ENCOUNTER (OUTPATIENT)
Dept: RADIOLOGY | Facility: HOSPITAL | Age: 69
Discharge: HOME | End: 2024-06-04
Attending: NURSE PRACTITIONER
Payer: MEDICARE

## 2024-06-04 DIAGNOSIS — R92.8 ABNORMAL MAMMOGRAM: ICD-10-CM

## 2024-06-04 PROCEDURE — 76642 ULTRASOUND BREAST LIMITED: CPT | Mod: 26,LT,, | Performed by: STUDENT IN AN ORGANIZED HEALTH CARE EDUCATION/TRAINING PROGRAM

## 2024-06-04 PROCEDURE — 77065 DX MAMMO INCL CAD UNI: CPT | Mod: TC,LT

## 2024-06-04 PROCEDURE — 76642 ULTRASOUND BREAST LIMITED: CPT | Mod: TC,LT

## 2024-06-04 PROCEDURE — 77061 BREAST TOMOSYNTHESIS UNI: CPT | Mod: TC,LT

## 2024-06-04 PROCEDURE — 77065 DX MAMMO INCL CAD UNI: CPT | Mod: 26,LT,, | Performed by: STUDENT IN AN ORGANIZED HEALTH CARE EDUCATION/TRAINING PROGRAM

## 2024-06-04 PROCEDURE — 77061 BREAST TOMOSYNTHESIS UNI: CPT | Mod: 26,LT,, | Performed by: STUDENT IN AN ORGANIZED HEALTH CARE EDUCATION/TRAINING PROGRAM

## 2024-06-18 RX ORDER — KETOCONAZOLE 20 MG/ML
1 SHAMPOO, SUSPENSION TOPICAL
COMMUNITY

## 2024-06-18 RX ORDER — MOMETASONE FUROATE 1 MG/ML
1 SOLUTION TOPICAL NIGHTLY
COMMUNITY

## 2024-06-19 ENCOUNTER — ANESTHESIA EVENT (OUTPATIENT)
Dept: SURGERY | Facility: HOSPITAL | Age: 69
End: 2024-06-19
Payer: MEDICARE

## 2024-06-19 NOTE — ANESTHESIA PREPROCEDURE EVALUATION
06/19/2024  Audrey Braxton is a 69 y.o., female.      Pre-op Assessment    I have reviewed the Patient Summary Reports.     I have reviewed the Nursing Notes. I have reviewed the NPO Status.   I have reviewed the Medications.     Review of Systems  Anesthesia Hx:  No problems with previous Anesthesia             Denies Family Hx of Anesthesia complications.    Denies Personal Hx of Anesthesia complications.                    Social:  Smoker       Cardiovascular:  Cardiovascular Normal                                            Pulmonary:  Pulmonary Normal                       Renal/:  Renal/ Normal                 Hepatic/GI:  Hepatic/GI Normal                 Musculoskeletal:  Arthritis          Spine Disorders:  Degenerative disease           Neurological:  Neurology Normal                                      Endocrine:  Endocrine Normal            Psych:    depression                Physical Exam  General: Well nourished, Cooperative, Alert and Oriented    Airway:  Mallampati: II / II  Mouth Opening: Normal  TM Distance: Normal  Tongue: Normal  Neck ROM: Normal ROM    Dental:  Intact    Chest/Lungs:  Normal Respiratory Rate    Heart:  Rate: Normal    Musculoskeletal:  Normal mobility      Anesthesia Plan  Type of Anesthesia, risks & benefits discussed:    Anesthesia Type: MAC  Intra-op Monitoring Plan: Standard ASA Monitors  Post Op Pain Control Plan: multimodal analgesia  Induction:  IV  Informed Consent: Informed consent signed with the Patient and all parties understand the risks and agree with anesthesia plan.  All questions answered.   ASA Score: 2  Day of Surgery Review of History & Physical: H&P Update referred to the surgeon/provider.  Anesthesia Plan Notes: Anesthesia plan was discussed with patient and/or representative. Risks and alternatives were discussed including the possibility of  alteration of plan.     Ready For Surgery From Anesthesia Perspective.     .

## 2024-06-20 ENCOUNTER — ANESTHESIA (OUTPATIENT)
Dept: SURGERY | Facility: HOSPITAL | Age: 69
End: 2024-06-20
Payer: MEDICARE

## 2024-06-20 ENCOUNTER — HOSPITAL ENCOUNTER (OUTPATIENT)
Facility: HOSPITAL | Age: 69
Discharge: HOME | End: 2024-06-20
Attending: INTERNAL MEDICINE | Admitting: INTERNAL MEDICINE
Payer: MEDICARE

## 2024-06-20 VITALS
HEART RATE: 57 BPM | SYSTOLIC BLOOD PRESSURE: 101 MMHG | OXYGEN SATURATION: 97 % | WEIGHT: 141 LBS | BODY MASS INDEX: 22.66 KG/M2 | RESPIRATION RATE: 18 BRPM | DIASTOLIC BLOOD PRESSURE: 58 MMHG | HEIGHT: 66 IN

## 2024-06-20 DIAGNOSIS — K62.5 RECTAL BLEEDING: Primary | ICD-10-CM

## 2024-06-20 PROCEDURE — 25000003 PHARM REV CODE 250: Performed by: NURSE ANESTHETIST, CERTIFIED REGISTERED

## 2024-06-20 PROCEDURE — 63600175 PHARM REV CODE 636 W HCPCS: Performed by: NURSE ANESTHETIST, CERTIFIED REGISTERED

## 2024-06-20 PROCEDURE — 37000009 HC ANESTHESIA EA ADD 15 MINS: Performed by: INTERNAL MEDICINE

## 2024-06-20 PROCEDURE — 45398 COLONOSCOPY W/BAND LIGATION: CPT | Performed by: INTERNAL MEDICINE

## 2024-06-20 PROCEDURE — 37000008 HC ANESTHESIA 1ST 15 MINUTES: Performed by: INTERNAL MEDICINE

## 2024-06-20 RX ORDER — SODIUM CHLORIDE 9 MG/ML
INJECTION, SOLUTION INTRAVENOUS CONTINUOUS
Status: DISCONTINUED | OUTPATIENT
Start: 2024-06-20 | End: 2024-06-20 | Stop reason: HOSPADM

## 2024-06-20 RX ORDER — ATROPINE SULFATE 0.4 MG/ML
INJECTION, SOLUTION ENDOTRACHEAL; INTRAMEDULLARY; INTRAMUSCULAR; INTRAVENOUS; SUBCUTANEOUS
Status: DISCONTINUED | OUTPATIENT
Start: 2024-06-20 | End: 2024-06-20

## 2024-06-20 RX ORDER — GLYCOPYRROLATE 0.2 MG/ML
INJECTION INTRAMUSCULAR; INTRAVENOUS
Status: DISCONTINUED | OUTPATIENT
Start: 2024-06-20 | End: 2024-06-20

## 2024-06-20 RX ORDER — ONDANSETRON HYDROCHLORIDE 2 MG/ML
INJECTION, SOLUTION INTRAVENOUS
Status: DISCONTINUED | OUTPATIENT
Start: 2024-06-20 | End: 2024-06-20

## 2024-06-20 RX ORDER — PROPOFOL 10 MG/ML
VIAL (ML) INTRAVENOUS
Status: DISCONTINUED | OUTPATIENT
Start: 2024-06-20 | End: 2024-06-20

## 2024-06-20 RX ORDER — LIDOCAINE HYDROCHLORIDE 10 MG/ML
INJECTION, SOLUTION EPIDURAL; INFILTRATION; INTRACAUDAL; PERINEURAL
Status: DISCONTINUED | OUTPATIENT
Start: 2024-06-20 | End: 2024-06-20

## 2024-06-20 RX ADMIN — PROPOFOL 100 MG: 10 INJECTION, EMULSION INTRAVENOUS at 10:06

## 2024-06-20 RX ADMIN — GLYCOPYRROLATE 0.2 MG: 0.2 INJECTION INTRAMUSCULAR; INTRAVENOUS at 10:06

## 2024-06-20 RX ADMIN — ATROPINE SULFATE 0.2 MG: 0.4 INJECTION, SOLUTION INTRAMUSCULAR; INTRAVENOUS; SUBCUTANEOUS at 11:06

## 2024-06-20 RX ADMIN — SODIUM CHLORIDE: 9 INJECTION, SOLUTION INTRAVENOUS at 09:06

## 2024-06-20 RX ADMIN — ONDANSETRON HYDROCHLORIDE 4 MG: 2 SOLUTION INTRAMUSCULAR; INTRAVENOUS at 10:06

## 2024-06-20 RX ADMIN — LIDOCAINE HYDROCHLORIDE 20 MG: 10 INJECTION, SOLUTION EPIDURAL; INFILTRATION; INTRACAUDAL; PERINEURAL at 10:06

## 2024-06-20 NOTE — H&P
Endoscopy History and Physical    PCP - Kailey Russell NP    Procedure - Colonoscopy   Sedation: MAC  ASA - per anesthesia  Mallampati - per anesthesia  History of Anesthesia problems - no  Family history Anesthesia problems -  no     HPI:  This is a 69 y.o. female here for evaluation of : personal history of colon polyps, hemorrhoids and rectal bleeding     Reflux - no  Dysphagia - no  Abdominal pain - no  Diarrhea - no    ROS:  Constitutional: No fevers, chills, No weight loss  ENT: No allergies  CV: No chest pain  Pulm: No cough, No shortness of breath  Ophtho: No vision changes  GI: see HPI  Medical History:  has a past medical history of Allergy, Anxiety, Cataract, Degenerative disc disease, cervical, Depression, and Hyperlipidemia.    Surgical History:  has a past surgical history that includes Cholecystectomy; Eye surgery; and Cataract extraction.    Family History: family history includes Diabetes in her mother; Hypertension in her mother; Stroke in her father and mother.. Otherwise no colon cancer, inflammatory bowel disease, or GI malignancies.    Social History:  reports that she has been smoking cigarettes. She has been exposed to tobacco smoke. She has never used smokeless tobacco. She reports current alcohol use of about 1.0 standard drink of alcohol per week. She reports that she does not use drugs.    Review of patient's allergies indicates:   Allergen Reactions    Amoxicillin-pot clavulanate Diarrhea       Medications:   Medications Prior to Admission   Medication Sig Dispense Refill Last Dose    acetaminophen (TYLENOL) 650 MG TbSR Take 650 mg by mouth Daily.   6/18/2024    ATIVAN 2 mg Tab Take 2.5 tablets by mouth every evening.   6/17/2024    calcium carbonate-vitamin D3 600 mg-20 mcg (800 unit) Chew Take 1 tablet by mouth 2 (two) times a day.   6/18/2024    cholecalciferol, vitamin D3, 125 mcg (5,000 unit) Tab Take 5,000 Units by mouth once daily.   6/18/2024    co-enzyme Q-10 50 mg capsule Take  50 mg by mouth once daily.   6/18/2024    colestipoL (COLESTID) 1 gram Tab Take 1 g by mouth 2 (two) times daily.   6/17/2024    DULoxetine 20 mg CDRS Take 1 tablet by mouth Daily.   6/17/2024    gabapentin (NEURONTIN) 300 MG capsule TAKE ONE CAPSULE BY MOUTH ONCE DAILY 90 capsule 1 6/17/2024    LIVALO 2 mg Tab tablet Take 1 tablet (2 mg total) by mouth once daily. 30 tablet 5 Past Week    loratadine (CLARITIN) 10 mg tablet Take 1 tablet by mouth Daily.   6/18/2024    montelukast (SINGULAIR) 10 mg tablet Take 1 tablet (10 mg total) by mouth once daily. 30 tablet 5 6/18/2024    hydrocortisone 2.5 % cream    Unknown    ketoconazole (NIZORAL) 2 % shampoo Apply 1 application  topically twice a week.       mometasone (ELOCON) 0.1 % solution Apply 1 Application topically every evening.       raloxifene (EVISTA) 60 mg tablet Take 1 tablet (60 mg total) by mouth once daily. 30 tablet 11        Objective Findings:    Vital Signs: Per nursing notes.    Physical Exam:  General Appearance: Well appearing in no acute distress  Head:   Normocephalic, without obvious abnormality  Eyes:    No scleral icterus  Airway: Open  Neck: No restriction in mobility  Lungs: CTA bilaterally in anterior and posterior fields, no wheezes, no crackles.  Heart:  Regular rate and rhythm, S1, S2 normal, no murmurs heard  Abdomen: Soft, non tender, non distended      Labs:  Lab Results   Component Value Date    WBC 4.36 (L) 03/13/2024    HGB 14.0 03/13/2024    HCT 43.1 03/13/2024     03/13/2024    CHOL 181 03/13/2024    TRIG 62 03/13/2024    HDL 65 (H) 03/13/2024    ALT 37 03/13/2024    AST 30 03/13/2024     03/13/2024    K 4.3 03/13/2024     (H) 03/13/2024    CREATININE 0.76 03/13/2024    BUN 27.0 (H) 03/13/2024    CO2 24 03/13/2024    TSH 2.037 03/13/2024    INR 1.0 01/10/2024    HGBA1C 5.2 03/13/2024         I have explained the risks and benefits of endoscopy procedures to the patient including but not limited to bleeding,  perforation, infection, and death.    Thank you so much for allowing me to participate in the care of Audrey Schaffer MD

## 2024-06-20 NOTE — OP NOTE
Colonoscopy Procedure Note    Date of procedure: 06/20/2024     Surgeon: Margarito Schaffer    Procedure: Colonoscopy with banding of LL hemorrhoid    Indications: personal history of colon polyps, hematochezia       Post op Diagnosis: internal hemorrhoids         Sedation: Per anesthesia:       Procedure Details: The patient was brought to the endoscopy suite after the risks, benefits, and alternatives of the procedure were described and the patient was given the opportunity to ask questions and signed informed consent. Patient was positioned in the left lateral decubitus position, continuous monitoring was initiated, and supplemental oxygen was provided via nasal cannula. Adequate sedation was achieved with the medications documented in chart and titrated during the procedure. A digital rectal exam was performed. Under direct visualization the colonoscope was introduced into the rectum and advanced to the cecum. The ileocecal valve and appendiceal orifice were identified. The terminal ileum was not intubated. The scope was withdrawn, and the mucosa was examined in a circular fashion. Retroflexion was done in the rectum. Air was evacuated from the colon and the procedure was completed. The patient tolerated the procedure well and was transferred to the recovery area in stable condition.     Findings:  Internal hemorrhoids  Sigmoid diverticulosis    Impression and Recommendations:   Repeat hemorrhoid banding in 2-4 weeks.   Repeat colonoscopy in 5 years        Margarito Schaffer MD

## 2024-06-20 NOTE — DISCHARGE SUMMARY
[facility]  Discharge Note  Short Stay    Procedure(s) (LRB):  COLONOSCOPY (N/A)      OUTCOME: Patient tolerated treatment/procedure well without complication and is now ready for discharge.    DISPOSITION: Home or Self Care    FINAL DIAGNOSIS:  personal history of colon polyps     FOLLOWUP:  repeat colonoscopy in 5 years     DISCHARGE INSTRUCTIONS:  No discharge procedures on file.     TIME SPENT ON DISCHARGE: 15 minutes

## 2024-06-20 NOTE — ANESTHESIA POSTPROCEDURE EVALUATION
Anesthesia Post Evaluation    Patient: Audrey Braxton    Procedure(s) Performed: Procedure(s) (LRB):  COLONOSCOPY (N/A)    Final Anesthesia Type: MAC      Patient location during evaluation: med/surg floor  Patient participation: Yes- Able to Participate  Level of consciousness: awake and alert  Post-procedure vital signs: reviewed and stable  Pain management: adequate  Airway patency: patent    PONV status at discharge: No PONV  Anesthetic complications: no      Cardiovascular status: blood pressure returned to baseline  Respiratory status: unassisted  Hydration status: euvolemic  Follow-up not needed.              Vitals Value Taken Time   BP  06/20/24 1114   Temp  06/20/24 1114   Pulse  06/20/24 1114   Resp  06/20/24 1114   SpO2  06/20/24 1114         No case tracking events are documented in the log.      Pain/Malena Score: No data recorded

## 2024-06-20 NOTE — DISCHARGE INSTRUCTIONS
No abnormalities found during your procedure  Dr. Schaffer did place a band on 1 hemorrhoid during procedure. Can band other 2 hemorrhoids in office.  Schedule next scope in 5 years   NO driving or signing important papers for 24 hours  Resume home diet and medications.

## 2024-08-06 DIAGNOSIS — M19.90 ARTHRITIS: ICD-10-CM

## 2024-08-06 DIAGNOSIS — M79.671 RIGHT FOOT PAIN: ICD-10-CM

## 2024-08-06 RX ORDER — GABAPENTIN 300 MG/1
300 CAPSULE ORAL
Qty: 90 CAPSULE | Refills: 1 | Status: SHIPPED | OUTPATIENT
Start: 2024-08-06

## 2024-08-09 DIAGNOSIS — E78.5 HYPERLIPIDEMIA, UNSPECIFIED HYPERLIPIDEMIA TYPE: ICD-10-CM

## 2024-08-09 RX ORDER — PITAVASTATIN CALCIUM 2.09 MG/1
2 TABLET, FILM COATED ORAL
Qty: 30 TABLET | Refills: 1 | Status: SHIPPED | OUTPATIENT
Start: 2024-08-09

## 2024-09-13 ENCOUNTER — CLINICAL SUPPORT (OUTPATIENT)
Dept: FAMILY MEDICINE | Facility: CLINIC | Age: 69
End: 2024-09-13
Payer: MEDICARE

## 2024-09-13 DIAGNOSIS — M85.89 OSTEOPENIA OF MULTIPLE SITES: ICD-10-CM

## 2024-09-13 DIAGNOSIS — L81.9 PIGMENTED SKIN LESION OF UNCERTAIN BEHAVIOR OF HEAD: ICD-10-CM

## 2024-09-13 DIAGNOSIS — M79.2 PERIPHERAL NEUROPATHIC PAIN: ICD-10-CM

## 2024-09-13 DIAGNOSIS — R82.90 ABNORMAL URINALYSIS: ICD-10-CM

## 2024-09-13 DIAGNOSIS — F41.9 ANXIETY: ICD-10-CM

## 2024-09-13 DIAGNOSIS — E78.5 HYPERLIPIDEMIA, UNSPECIFIED HYPERLIPIDEMIA TYPE: ICD-10-CM

## 2024-09-13 LAB
ALBUMIN SERPL-MCNC: 4 G/DL (ref 3.4–4.8)
ALBUMIN/GLOB SERPL: 1.6 RATIO (ref 1.1–2)
ALP SERPL-CCNC: 71 UNIT/L (ref 40–150)
ALT SERPL-CCNC: 17 UNIT/L (ref 0–55)
ANION GAP SERPL CALC-SCNC: 10 MEQ/L
AST SERPL-CCNC: 16 UNIT/L (ref 5–34)
BASOPHILS # BLD AUTO: 0.03 X10(3)/MCL
BASOPHILS NFR BLD AUTO: 0.8 %
BILIRUB SERPL-MCNC: 0.6 MG/DL
BILIRUB UR QL STRIP.AUTO: NEGATIVE
BUN SERPL-MCNC: 23 MG/DL (ref 9.8–20.1)
CALCIUM SERPL-MCNC: 9.7 MG/DL (ref 8.4–10.2)
CHLORIDE SERPL-SCNC: 108 MMOL/L (ref 98–107)
CHOLEST SERPL-MCNC: 251 MG/DL
CHOLEST/HDLC SERPL: 3 {RATIO} (ref 0–5)
CLARITY UR: CLEAR
CO2 SERPL-SCNC: 27 MMOL/L (ref 23–31)
COLOR UR AUTO: YELLOW
CREAT SERPL-MCNC: 0.8 MG/DL (ref 0.55–1.02)
CREAT/UREA NIT SERPL: 29
EOSINOPHIL # BLD AUTO: 0.13 X10(3)/MCL (ref 0–0.9)
EOSINOPHIL NFR BLD AUTO: 3.4 %
ERYTHROCYTE [DISTWIDTH] IN BLOOD BY AUTOMATED COUNT: 13.3 % (ref 11.5–17)
EST. AVERAGE GLUCOSE BLD GHB EST-MCNC: 102.5 MG/DL
GFR SERPLBLD CREATININE-BSD FMLA CKD-EPI: >60 ML/MIN/1.73/M2
GLOBULIN SER-MCNC: 2.5 GM/DL (ref 2.4–3.5)
GLUCOSE SERPL-MCNC: 90 MG/DL (ref 82–115)
GLUCOSE UR QL STRIP: NEGATIVE
HBA1C MFR BLD: 5.2 %
HCT VFR BLD AUTO: 45.5 % (ref 37–47)
HDLC SERPL-MCNC: 72 MG/DL (ref 35–60)
HGB BLD-MCNC: 14.3 G/DL (ref 12–16)
HGB UR QL STRIP: NEGATIVE
IMM GRANULOCYTES # BLD AUTO: 0 X10(3)/MCL (ref 0–0.04)
IMM GRANULOCYTES NFR BLD AUTO: 0 %
KETONES UR QL STRIP: NEGATIVE
LDLC SERPL CALC-MCNC: 165 MG/DL (ref 50–140)
LEUKOCYTE ESTERASE UR QL STRIP: NEGATIVE
LYMPHOCYTES # BLD AUTO: 1.55 X10(3)/MCL (ref 0.6–4.6)
LYMPHOCYTES NFR BLD AUTO: 40.9 %
MCH RBC QN AUTO: 31.7 PG (ref 27–31)
MCHC RBC AUTO-ENTMCNC: 31.4 G/DL (ref 33–36)
MCV RBC AUTO: 100.9 FL (ref 80–94)
MONOCYTES # BLD AUTO: 0.3 X10(3)/MCL (ref 0.1–1.3)
MONOCYTES NFR BLD AUTO: 7.9 %
NEUTROPHILS # BLD AUTO: 1.78 X10(3)/MCL (ref 2.1–9.2)
NEUTROPHILS NFR BLD AUTO: 47 %
NITRITE UR QL STRIP: NEGATIVE
NRBC BLD AUTO-RTO: 0 %
PH UR STRIP: 6 [PH]
PLATELET # BLD AUTO: 205 X10(3)/MCL (ref 130–400)
PMV BLD AUTO: 11 FL (ref 7.4–10.4)
POTASSIUM SERPL-SCNC: 4.1 MMOL/L (ref 3.5–5.1)
PROT SERPL-MCNC: 6.5 GM/DL (ref 5.8–7.6)
PROT UR QL STRIP: NEGATIVE
RBC # BLD AUTO: 4.51 X10(6)/MCL (ref 4.2–5.4)
SODIUM SERPL-SCNC: 145 MMOL/L (ref 136–145)
SP GR UR STRIP.AUTO: 1.02 (ref 1–1.03)
TRIGL SERPL-MCNC: 72 MG/DL (ref 37–140)
TSH SERPL-ACNC: 3.19 UIU/ML (ref 0.35–4.94)
UROBILINOGEN UR STRIP-ACNC: 0.2
VLDLC SERPL CALC-MCNC: 14 MG/DL
WBC # BLD AUTO: 3.79 X10(3)/MCL (ref 4.5–11.5)

## 2024-09-13 PROCEDURE — 84443 ASSAY THYROID STIM HORMONE: CPT | Performed by: NURSE PRACTITIONER

## 2024-09-13 PROCEDURE — 85025 COMPLETE CBC W/AUTO DIFF WBC: CPT | Performed by: NURSE PRACTITIONER

## 2024-09-13 PROCEDURE — 81003 URINALYSIS AUTO W/O SCOPE: CPT | Performed by: NURSE PRACTITIONER

## 2024-09-13 PROCEDURE — 80053 COMPREHEN METABOLIC PANEL: CPT | Performed by: NURSE PRACTITIONER

## 2024-09-13 PROCEDURE — 36415 COLL VENOUS BLD VENIPUNCTURE: CPT

## 2024-09-13 PROCEDURE — 83036 HEMOGLOBIN GLYCOSYLATED A1C: CPT | Performed by: NURSE PRACTITIONER

## 2024-09-13 PROCEDURE — 80061 LIPID PANEL: CPT | Performed by: NURSE PRACTITIONER

## 2024-10-08 ENCOUNTER — OFFICE VISIT (OUTPATIENT)
Dept: FAMILY MEDICINE | Facility: CLINIC | Age: 69
End: 2024-10-08
Payer: MEDICARE

## 2024-10-08 VITALS
WEIGHT: 141 LBS | HEIGHT: 65 IN | HEART RATE: 64 BPM | RESPIRATION RATE: 20 BRPM | BODY MASS INDEX: 23.49 KG/M2 | SYSTOLIC BLOOD PRESSURE: 98 MMHG | DIASTOLIC BLOOD PRESSURE: 56 MMHG | TEMPERATURE: 98 F

## 2024-10-08 DIAGNOSIS — R09.81 SINUS CONGESTION: ICD-10-CM

## 2024-10-08 DIAGNOSIS — M79.2 PERIPHERAL NEUROPATHIC PAIN: ICD-10-CM

## 2024-10-08 DIAGNOSIS — F41.9 ANXIETY: ICD-10-CM

## 2024-10-08 DIAGNOSIS — R79.89 ABNORMAL CBC: ICD-10-CM

## 2024-10-08 DIAGNOSIS — M85.89 OSTEOPENIA OF MULTIPLE SITES: ICD-10-CM

## 2024-10-08 DIAGNOSIS — M19.90 ARTHRITIS: ICD-10-CM

## 2024-10-08 DIAGNOSIS — M79.671 RIGHT FOOT PAIN: ICD-10-CM

## 2024-10-08 DIAGNOSIS — E78.5 HYPERLIPIDEMIA, UNSPECIFIED HYPERLIPIDEMIA TYPE: ICD-10-CM

## 2024-10-08 PROCEDURE — 1126F AMNT PAIN NOTED NONE PRSNT: CPT | Mod: ,,, | Performed by: NURSE PRACTITIONER

## 2024-10-08 PROCEDURE — 99214 OFFICE O/P EST MOD 30 MIN: CPT | Mod: ,,, | Performed by: NURSE PRACTITIONER

## 2024-10-08 PROCEDURE — 1159F MED LIST DOCD IN RCRD: CPT | Mod: ,,, | Performed by: NURSE PRACTITIONER

## 2024-10-08 PROCEDURE — 3074F SYST BP LT 130 MM HG: CPT | Mod: ,,, | Performed by: NURSE PRACTITIONER

## 2024-10-08 PROCEDURE — 3044F HG A1C LEVEL LT 7.0%: CPT | Mod: ,,, | Performed by: NURSE PRACTITIONER

## 2024-10-08 PROCEDURE — 3078F DIAST BP <80 MM HG: CPT | Mod: ,,, | Performed by: NURSE PRACTITIONER

## 2024-10-08 PROCEDURE — 3008F BODY MASS INDEX DOCD: CPT | Mod: ,,, | Performed by: NURSE PRACTITIONER

## 2024-10-08 RX ORDER — MONTELUKAST SODIUM 10 MG/1
10 TABLET ORAL NIGHTLY
Qty: 90 TABLET | Refills: 1 | Status: SHIPPED | OUTPATIENT
Start: 2024-10-08 | End: 2025-04-06

## 2024-10-08 RX ORDER — GABAPENTIN 300 MG/1
300 CAPSULE ORAL DAILY
Qty: 90 CAPSULE | Refills: 1 | Status: SHIPPED | OUTPATIENT
Start: 2024-10-08 | End: 2025-04-06

## 2024-10-08 RX ORDER — PITAVASTATIN CALCIUM 2.09 MG/1
2 TABLET, FILM COATED ORAL DAILY
Qty: 90 TABLET | Refills: 1 | Status: SHIPPED | OUTPATIENT
Start: 2024-10-08

## 2024-10-08 RX ORDER — ALENDRONATE SODIUM 70 MG/1
70 TABLET ORAL
Qty: 12 TABLET | Refills: 3 | Status: SHIPPED | OUTPATIENT
Start: 2024-10-08 | End: 2025-10-08

## 2024-10-08 NOTE — PROGRESS NOTES
"Subjective:       Patient ID: Audrey Braxton is a 69 y.o. female.    Chief Complaint: Anxiety ( 6 month follow up), Hyperlipidemia (6 month follow up), Results (Lab results), and Medication Refill (Refill allergy medication and gabapentin)    The patient is 69-year-old female.  The patient stopped taking her pitavastatin and her Evista without letting me know.    We went over her lab work together.  Her total cholesterol is 245 in her LDL is elevated at 165.  The patient has a bone density from 2 years ago that shows she has osteopenia.  I will reorder bone density in order Fosamax instead of Evista.  Her CBC looks like she may be anemic so I am ordering an anemia panel.  The patient pain from "arthritis" I will order a rheumatoid panel and a C-reactive protein.  I will call these results to patient.    Review of Systems 12 point review of systems conducted, negative except as stated in the history of present illness. See HPI for details.        Objective:        Visit Vitals  BP (!) 98/56   Pulse 64   Temp 97.8 °F (36.6 °C)   Resp 20   Ht 5' 5" (1.651 m)   Wt 64 kg (141 lb)   BMI 23.46 kg/m²        Physical Exam  Vitals and nursing note reviewed.   HENT:      Head: Normocephalic.      Right Ear: Tympanic membrane normal.      Left Ear: Tympanic membrane normal.      Nose: Nose normal.      Mouth/Throat:      Mouth: Mucous membranes are moist.   Eyes:      Extraocular Movements: Extraocular movements intact.   Cardiovascular:      Pulses: Normal pulses.      Heart sounds: No murmur heard.  Pulmonary:      Effort: Pulmonary effort is normal.      Breath sounds: Normal breath sounds.   Abdominal:      General: Bowel sounds are normal.      Palpations: Abdomen is soft.   Musculoskeletal:         General: Normal range of motion.      Cervical back: Normal range of motion and neck supple.   Skin:     General: Skin is warm and dry.   Neurological:      Mental Status: She is alert and oriented to person, place, and time. "           Assessment:           ICD-10-CM ICD-9-CM   1. Hyperlipidemia, unspecified hyperlipidemia type  E78.5 272.4   2. Arthritis  M19.90 716.90   3. Peripheral neuropathic pain  M79.2 729.2   4. Anxiety  F41.9 300.00   5. Abnormal CBC  R79.89 790.6   6. Right foot pain  M79.671 729.5   7. Osteopenia of multiple sites  M85.89 733.90   8. Sinus congestion  R09.81 478.19            Plan:         1. Hyperlipidemia, unspecified hyperlipidemia type  Take medication as ordered  - LIVALO 2 mg Tab tablet; Take 1 tablet (2 mg total) by mouth once daily.  Dispense: 90 tablet; Refill: 1    2. Arthritis  May take Tylenol for pain  - RHEUMATOID ARTHRITIS PANEL; Future  - C-Reactive Protein; Future  - gabapentin (NEURONTIN) 300 MG capsule; Take 1 capsule (300 mg total) by mouth Daily.  Dispense: 90 capsule; Refill: 1    3. Peripheral neuropathic pain  Gabapentin 300 mg capsules take 1 by mouth daily  - RHEUMATOID ARTHRITIS PANEL; Future    4. Anxiety  Controlled-patient sees another provider    5. Abnormal CBC  - Ferritin; Future  - Iron and TIBC; Future  - Vitamin B12; Future  - Reticulocytes; Future    6. Right foot pain  - gabapentin (NEURONTIN) 300 MG capsule; Take 1 capsule (300 mg total) by mouth Daily.  Dispense: 90 capsule; Refill: 1    7. Osteopenia of multiple sites  - alendronate (FOSAMAX) 70 MG tablet; Take 1 tablet (70 mg total) by mouth every 7 days.  Dispense: 12 tablet; Refill: 3    8. Sinus congestion  - montelukast (SINGULAIR) 10 mg tablet; Take 1 tablet (10 mg total) by mouth every evening.  Dispense: 90 tablet; Refill: 1        Follow up in about 6 months (around 4/8/2025).     Future Appointments   Date Time Provider Department Center   10/16/2024  8:30 AM NURSE, [facility]   4/23/2025  8:00 AM NURSE, [facility]   4/30/2025  1:00 PM Kailey Russell NP [facility]        Past Medical History:   Diagnosis Date    Allergy     Anxiety      Cataract     Degenerative disc disease, cervical     Depression     Hyperlipidemia         Review of patient's allergies indicates:   Allergen Reactions    Amoxicillin-pot clavulanate Diarrhea        Current Outpatient Medications   Medication Instructions    acetaminophen (TYLENOL) 650 mg, Daily    alendronate (FOSAMAX) 70 mg, Oral, Every 7 days    ATIVAN 2 mg Tab 2.5 tablets, Nightly    cholecalciferol (vitamin D3) 5,000 Units, Daily    DULoxetine 20 mg CDRS 1 tablet, Daily    gabapentin (NEURONTIN) 300 mg, Oral, Daily    hydrocortisone 2.5 % cream No dose, route, or frequency recorded.    ketoconazole (NIZORAL) 2 % shampoo 1 application , Twice weekly    LIVALO 2 mg, Oral, Daily    loratadine (CLARITIN) 10 mg tablet 1 tablet, Daily    mometasone (ELOCON) 0.1 % solution 1 Application, Nightly    montelukast (SINGULAIR) 10 mg, Oral, Nightly          Patient Active Problem List   Diagnosis    Rib pain on left side    Post-menopausal    Hyperlipidemia    Anxiety    Left upper quadrant abdominal pain    Acute cystitis without hematuria    Diarrhea    Cough    Lymphadenopathy    Depression    Peripheral neuropathic pain    Arthritis    Right foot pain    Neck pain    Osteopenia of multiple sites    Other screening mammogram    Other biomechanical lesions of head region    Abnormal urine    Abnormal CBC    Sinus congestion             Past Medical History:   Diagnosis Date    Allergy     Anxiety     Cataract     Degenerative disc disease, cervical     Depression     Hyperlipidemia           Past Surgical History:   Procedure Laterality Date    CATARACT EXTRACTION      CHOLECYSTECTOMY      COLONOSCOPY N/A 6/20/2024    Procedure: COLONOSCOPY;  Surgeon: Margarito Schaffer MD;  Location: [facility];  Service: Gastroenterology;  Laterality: N/A;    EYE SURGERY             reports that she has been smoking cigarettes. She has been exposed to tobacco smoke. She has never used smokeless tobacco. She reports current alcohol use of  about 1.0 standard drink of alcohol per week. She reports that she does not use drugs.      There is no immunization history on file for this patient.     Health Maintenance   Topic Date Due    TETANUS VACCINE  Never done    Shingles Vaccine (1 of 2) Never done    Mammogram  06/04/2025    DEXA Scan  06/30/2025    Lipid Panel  09/13/2029    Colorectal Cancer Screening  06/20/2034    Hepatitis C Screening  Completed

## 2024-10-16 ENCOUNTER — CLINICAL SUPPORT (OUTPATIENT)
Dept: FAMILY MEDICINE | Facility: CLINIC | Age: 69
End: 2024-10-16
Payer: MEDICARE

## 2024-10-16 DIAGNOSIS — R79.89 ABNORMAL CBC: ICD-10-CM

## 2024-10-16 DIAGNOSIS — M19.90 ARTHRITIS: ICD-10-CM

## 2024-10-16 DIAGNOSIS — M79.2 PERIPHERAL NEUROPATHIC PAIN: ICD-10-CM

## 2024-10-16 PROCEDURE — 82607 VITAMIN B-12: CPT | Performed by: NURSE PRACTITIONER

## 2024-10-16 PROCEDURE — 83540 ASSAY OF IRON: CPT | Performed by: NURSE PRACTITIONER

## 2024-10-16 PROCEDURE — 85045 AUTOMATED RETICULOCYTE COUNT: CPT | Performed by: NURSE PRACTITIONER

## 2024-10-16 PROCEDURE — 83550 IRON BINDING TEST: CPT | Performed by: NURSE PRACTITIONER

## 2024-10-16 PROCEDURE — 36415 COLL VENOUS BLD VENIPUNCTURE: CPT

## 2024-10-16 PROCEDURE — 86431 RHEUMATOID FACTOR QUANT: CPT | Performed by: NURSE PRACTITIONER

## 2024-10-16 PROCEDURE — 86140 C-REACTIVE PROTEIN: CPT | Performed by: NURSE PRACTITIONER

## 2024-10-16 PROCEDURE — 82728 ASSAY OF FERRITIN: CPT | Performed by: NURSE PRACTITIONER

## 2024-10-17 LAB
CRP SERPL-MCNC: 1.3 MG/L
CYCLIC CITRULLINATED PEPTIDE (CCP) (OHS): 0.7 U/ML
FERRITIN SERPL-MCNC: 150.36 NG/ML (ref 4.63–204)
IRON SATN MFR SERPL: 64 % (ref 20–50)
IRON SERPL-MCNC: 174 UG/DL (ref 50–170)
RET# (OHS): 0.07 X10E6/UL (ref 0.02–0.08)
RETICULOCYTE COUNT AUTOMATED (OLG): 1.51 % (ref 1.1–2.1)
RHEUMATOID FACTOR IGA QUANTITATIVE (OLG): 1.9 IU/ML
RHEUMATOID FACTOR IGM QUANTITATIVE (OLG): <0.6 IU/ML
TIBC SERPL-MCNC: 272 UG/DL (ref 250–450)
TIBC SERPL-MCNC: 98 UG/DL (ref 70–310)
TRANSFERRIN SERPL-MCNC: 226 MG/DL (ref 173–360)
VIT B12 SERPL-MCNC: 521 PG/ML (ref 213–816)

## 2024-10-25 DIAGNOSIS — R89.9 ABNORMAL LABORATORY TEST: Primary | ICD-10-CM

## 2024-10-25 NOTE — PROGRESS NOTES
I called the patient and went over her lab work with her. Her iron level and iron saturation is elevated. I told her to donate blood. I ordered an LIAN

## 2025-01-24 ENCOUNTER — OFFICE VISIT (OUTPATIENT)
Dept: FAMILY MEDICINE | Facility: CLINIC | Age: 70
End: 2025-01-24
Payer: MEDICARE

## 2025-01-24 VITALS
BODY MASS INDEX: 23.66 KG/M2 | SYSTOLIC BLOOD PRESSURE: 108 MMHG | WEIGHT: 142 LBS | HEIGHT: 65 IN | DIASTOLIC BLOOD PRESSURE: 70 MMHG | TEMPERATURE: 97 F | HEART RATE: 67 BPM | RESPIRATION RATE: 20 BRPM

## 2025-01-24 DIAGNOSIS — H65.191 ACUTE EFFUSION OF RIGHT EAR: ICD-10-CM

## 2025-01-24 DIAGNOSIS — R09.81 NASAL CONGESTION: ICD-10-CM

## 2025-01-24 DIAGNOSIS — R50.9 FEVER, UNSPECIFIED FEVER CAUSE: ICD-10-CM

## 2025-01-24 DIAGNOSIS — J10.1 INFLUENZA A: ICD-10-CM

## 2025-01-24 DIAGNOSIS — R05.9 COUGH, UNSPECIFIED TYPE: ICD-10-CM

## 2025-01-24 DIAGNOSIS — H92.09 OTALGIA, UNSPECIFIED LATERALITY: ICD-10-CM

## 2025-01-24 PROBLEM — R10.12 LEFT UPPER QUADRANT ABDOMINAL PAIN: Status: RESOLVED | Noted: 2022-07-26 | Resolved: 2025-01-24

## 2025-01-24 PROBLEM — R79.89 ABNORMAL CBC: Status: RESOLVED | Noted: 2024-10-08 | Resolved: 2025-01-24

## 2025-01-24 PROBLEM — M54.2 NECK PAIN: Status: RESOLVED | Noted: 2023-07-19 | Resolved: 2025-01-24

## 2025-01-24 PROBLEM — R82.90 ABNORMAL URINE: Status: RESOLVED | Noted: 2024-03-27 | Resolved: 2025-01-24

## 2025-01-24 PROBLEM — R19.7 DIARRHEA: Status: RESOLVED | Noted: 2022-07-26 | Resolved: 2025-01-24

## 2025-01-24 PROBLEM — N30.00 ACUTE CYSTITIS WITHOUT HEMATURIA: Status: RESOLVED | Noted: 2022-07-26 | Resolved: 2025-01-24

## 2025-01-24 PROBLEM — M79.671 RIGHT FOOT PAIN: Status: RESOLVED | Noted: 2023-04-04 | Resolved: 2025-01-24

## 2025-01-24 PROBLEM — R07.81 RIB PAIN ON LEFT SIDE: Status: RESOLVED | Noted: 2022-06-23 | Resolved: 2025-01-24

## 2025-01-24 LAB
CTP QC/QA: YES
POC MOLECULAR INFLUENZA A AGN: POSITIVE
POC MOLECULAR INFLUENZA B AGN: NEGATIVE

## 2025-01-24 PROCEDURE — 1159F MED LIST DOCD IN RCRD: CPT | Mod: ,,, | Performed by: NURSE PRACTITIONER

## 2025-01-24 PROCEDURE — 1126F AMNT PAIN NOTED NONE PRSNT: CPT | Mod: ,,, | Performed by: NURSE PRACTITIONER

## 2025-01-24 PROCEDURE — 99214 OFFICE O/P EST MOD 30 MIN: CPT | Mod: ,,, | Performed by: NURSE PRACTITIONER

## 2025-01-24 PROCEDURE — 3008F BODY MASS INDEX DOCD: CPT | Mod: ,,, | Performed by: NURSE PRACTITIONER

## 2025-01-24 PROCEDURE — 3074F SYST BP LT 130 MM HG: CPT | Mod: ,,, | Performed by: NURSE PRACTITIONER

## 2025-01-24 PROCEDURE — 3078F DIAST BP <80 MM HG: CPT | Mod: ,,, | Performed by: NURSE PRACTITIONER

## 2025-01-24 PROCEDURE — 87502 INFLUENZA DNA AMP PROBE: CPT | Mod: QW,RHCUB | Performed by: NURSE PRACTITIONER

## 2025-01-24 RX ORDER — NEOMYCIN SULFATE, POLYMYXIN B SULFATE AND HYDROCORTISONE 10; 3.5; 1 MG/ML; MG/ML; [USP'U]/ML
3 SUSPENSION/ DROPS AURICULAR (OTIC) 4 TIMES DAILY
Qty: 10 ML | Refills: 0 | Status: SHIPPED | OUTPATIENT
Start: 2025-01-24 | End: 2025-01-31

## 2025-01-24 RX ORDER — IBUPROFEN 600 MG/1
600 TABLET ORAL EVERY 6 HOURS PRN
Qty: 30 TABLET | Refills: 1 | Status: SHIPPED | OUTPATIENT
Start: 2025-01-24 | End: 2025-02-03

## 2025-01-24 RX ORDER — PROMETHAZINE HYDROCHLORIDE AND CODEINE PHOSPHATE 6.25; 1 MG/5ML; MG/5ML
5 SOLUTION ORAL EVERY 6 HOURS PRN
Qty: 140 ML | Refills: 0 | Status: SHIPPED | OUTPATIENT
Start: 2025-01-24 | End: 2025-01-31

## 2025-01-24 RX ORDER — AZELASTINE 1 MG/ML
1 SPRAY, METERED NASAL 2 TIMES DAILY
Qty: 3 ML | Refills: 6 | Status: SHIPPED | OUTPATIENT
Start: 2025-01-24 | End: 2026-01-24

## 2025-01-24 NOTE — PROGRESS NOTES
"Subjective:       Patient ID: Audrey Braxton is a 69 y.o. female.    Chief Complaint: Cough (Cough, congestion, sore throat, sneezing, fever, chills, achy X's 6 days)      The patient is a 69-year-old female who presents with a cough, fever, chills, sinus congestion, nasal congestion, right ear pain, and body aches for 6 days.  Relief measures at home have been montelukast in the antihistamine without relief.  The patient has taken Tylenol for fever with relief of fever.    The patient did not influenza vaccination this year.    Influenza A and B swab-positive for influenza A      Review of [id number] point review of systems conducted, negative except as stated in the history of present illness. See HPI for details.      Objective:      Visit Vitals  /70   Pulse 67   Temp 97.4 °F (36.3 °C)   Resp 20   Ht 5' 5" (1.651 m)   Wt 64.4 kg (142 lb)   BMI 23.63 kg/m²     Physical Exam  Vitals and nursing note reviewed.   HENT:      Head: Normocephalic.      Left Ear: Tympanic membrane normal.      Ears:      Comments: Right TM bulging with effusion     Nose: Congestion and rhinorrhea present.      Mouth/Throat:      Pharynx: Oropharyngeal exudate and posterior oropharyngeal erythema present.   Eyes:      Extraocular Movements: Extraocular movements intact.   Cardiovascular:      Rate and Rhythm: Normal rate and regular rhythm.      Heart sounds: No murmur heard.  Pulmonary:      Effort: Pulmonary effort is normal.      Breath sounds: Normal breath sounds.   Abdominal:      General: Bowel sounds are normal.      Palpations: Abdomen is soft.   Musculoskeletal:         General: Normal range of motion.      Cervical back: Normal range of motion and neck supple.   Skin:     General: Skin is warm and dry.   Neurological:      Mental Status: She is alert and oriented to person, place, and time.       Current Outpatient Medications   Medication Instructions    acetaminophen (TYLENOL) 650 mg, Daily    alendronate (FOSAMAX) 70 " mg, Oral, Every 7 days    ATIVAN 2 mg Tab 2.5 tablets, Nightly    azelastine (ASTELIN) 137 mcg, Nasal, 2 times daily    cholecalciferol (vitamin D3) 5,000 Units, Daily    DULoxetine 20 mg CDRS 1 tablet, Daily    gabapentin (NEURONTIN) 300 mg, Oral, Daily    hydrocortisone 2.5 % cream No dose, route, or frequency recorded.    ibuprofen (ADVIL,MOTRIN) 600 mg, Oral, Every 6 hours PRN    ketoconazole (NIZORAL) 2 % shampoo 1 application , Twice weekly    LIVALO 2 mg, Oral, Daily    loratadine (CLARITIN) 10 mg tablet 1 tablet, Daily    mometasone (ELOCON) 0.1 % solution 1 Application, Nightly    montelukast (SINGULAIR) 10 mg, Oral, Nightly    neomycin-polymyxin-hydrocortisone (CORTISPORIN) 3.5-10,000-1 mg/mL-unit/mL-% otic suspension 3 drops, Right Ear, 4 times daily    promethazine-codeine 6.25-10 mg/5 ml (PHENERGAN WITH CODEINE) 6.25-10 mg/5 mL syrup 5 mLs, Oral, Every 6 hours PRN     is allergic to amoxicillin-pot clavulanate.       Assessment:         ICD-10-CM ICD-9-CM   1. Influenza A  J10.1 487.1   2. Fever, unspecified fever cause  R50.9 780.60   3. Cough, unspecified type  R05.9 786.2   4. Acute effusion of right ear  H65.191 381.00   5. Otalgia, unspecified laterality  H92.09 388.70   6. Nasal congestion  R09.81 478.19          Plan:       1. Influenza A  Patient has been ill for 6 days with the flu-too long for Tamiflu  Hydrate with 4-6 8 oz glasses water daily  May take immune boosters such as vitamin-C, vitamin D3, and zinc    2. Fever, unspecified fever cause  May alternate Tylenol and ibuprofen  - ibuprofen (ADVIL,MOTRIN) 600 MG tablet; Take 1 tablet (600 mg total) by mouth every 6 (six) hours as needed for Pain (fever).  Dispense: 30 tablet; Refill: 1    3. Cough, unspecified type  - POCT Influenza A/B Molecular-positive  - promethazine-codeine 6.25-10 mg/5 ml (PHENERGAN WITH CODEINE) 6.25-10 mg/5 mL syrup; Take 5 mLs by mouth every 6 (six) hours as needed for Cough.  Dispense: 140 mL; Refill: 0.  Patient  instructed not to take gabapentin with this medication    4. Acute effusion of right ear  - neomycin-polymyxin-hydrocortisone (CORTISPORIN) 3.5-10,000-1 mg/mL-unit/mL-% otic suspension; Place 3 drops into the right ear 4 (four) times daily. for 7 days  Dispense: 10 mL; Refill: 0    5. Otalgia, unspecified laterality  - neomycin-polymyxin-hydrocortisone (CORTISPORIN) 3.5-10,000-1 mg/mL-unit/mL-% otic suspension; Place 3 drops into the right ear 4 (four) times daily. for 7 days  Dispense: 10 mL; Refill: 0  - ibuprofen (ADVIL,MOTRIN) 600 MG tablet; Take 1 tablet (600 mg total) by mouth every 6 (six) hours as needed for Pain (fever).  Dispense: 30 tablet; Refill: 1    6. Nasal congestion  - azelastine (ASTELIN) 137 mcg (0.1 %) nasal spray; 1 spray (137 mcg total) by Nasal route 2 (two) times daily.  Dispense: 3 mL; Refill: 6  Continue take montelukast and antihistamine as prescribed        Follow up if symptoms worsen or fail to improve.     Future Appointments   Date Time Provider Department Center   2/5/2025  8:00 AM NURSE, [facility]   4/23/2025  8:00 AM NURSE, [facility]   4/30/2025  1:00 [facility]

## 2025-02-04 ENCOUNTER — CLINICAL SUPPORT (OUTPATIENT)
Dept: FAMILY MEDICINE | Facility: CLINIC | Age: 70
End: 2025-02-04
Payer: MEDICARE

## 2025-02-04 DIAGNOSIS — M85.89 OSTEOPENIA OF MULTIPLE SITES: ICD-10-CM

## 2025-02-04 DIAGNOSIS — F41.9 ANXIETY: ICD-10-CM

## 2025-02-04 DIAGNOSIS — M79.2 PERIPHERAL NEUROPATHIC PAIN: Primary | ICD-10-CM

## 2025-02-04 DIAGNOSIS — M79.2 PERIPHERAL NEUROPATHIC PAIN: ICD-10-CM

## 2025-02-04 DIAGNOSIS — R89.9 ABNORMAL LABORATORY TEST: ICD-10-CM

## 2025-02-04 DIAGNOSIS — F32.89 OTHER DEPRESSION: ICD-10-CM

## 2025-02-04 DIAGNOSIS — E78.5 HYPERLIPIDEMIA, UNSPECIFIED HYPERLIPIDEMIA TYPE: ICD-10-CM

## 2025-02-04 PROBLEM — H65.191 ACUTE EFFUSION OF RIGHT EAR: Status: RESOLVED | Noted: 2025-01-24 | Resolved: 2025-02-04

## 2025-02-04 PROBLEM — J10.1 INFLUENZA A: Status: RESOLVED | Noted: 2025-01-24 | Resolved: 2025-02-04

## 2025-02-04 PROBLEM — R50.9 FEVER: Status: RESOLVED | Noted: 2025-01-24 | Resolved: 2025-02-04

## 2025-02-04 PROBLEM — R05.9 COUGH: Status: RESOLVED | Noted: 2022-09-15 | Resolved: 2025-02-04

## 2025-02-04 PROBLEM — R09.81 NASAL CONGESTION: Status: RESOLVED | Noted: 2024-10-08 | Resolved: 2025-02-04

## 2025-02-04 PROBLEM — H92.09 OTALGIA: Status: RESOLVED | Noted: 2025-01-24 | Resolved: 2025-02-04

## 2025-02-04 PROBLEM — R59.1 LYMPHADENOPATHY: Status: RESOLVED | Noted: 2022-09-15 | Resolved: 2025-02-04

## 2025-02-04 PROCEDURE — 36415 COLL VENOUS BLD VENIPUNCTURE: CPT

## 2025-02-04 PROCEDURE — 86039 ANTINUCLEAR ANTIBODIES (ANA): CPT | Performed by: NURSE PRACTITIONER

## 2025-02-06 LAB — ANA SER QL HEP2 SUBST: NORMAL

## 2025-02-12 ENCOUNTER — PATIENT MESSAGE (OUTPATIENT)
Dept: FAMILY MEDICINE | Facility: CLINIC | Age: 70
End: 2025-02-12
Payer: MEDICARE

## 2025-04-23 ENCOUNTER — CLINICAL SUPPORT (OUTPATIENT)
Dept: FAMILY MEDICINE | Facility: CLINIC | Age: 70
End: 2025-04-23
Payer: MEDICARE

## 2025-04-23 DIAGNOSIS — E78.5 HYPERLIPIDEMIA, UNSPECIFIED HYPERLIPIDEMIA TYPE: ICD-10-CM

## 2025-04-23 DIAGNOSIS — M79.2 PERIPHERAL NEUROPATHIC PAIN: Primary | ICD-10-CM

## 2025-04-23 DIAGNOSIS — F41.9 ANXIETY: ICD-10-CM

## 2025-04-23 LAB
ALBUMIN SERPL-MCNC: 3.9 G/DL (ref 3.4–4.8)
ALBUMIN/GLOB SERPL: 1.4 RATIO (ref 1.1–2)
ALP SERPL-CCNC: 67 UNIT/L (ref 40–150)
ALT SERPL-CCNC: 22 UNIT/L (ref 0–55)
ANION GAP SERPL CALC-SCNC: 8 MEQ/L
AST SERPL-CCNC: 22 UNIT/L (ref 11–45)
BASOPHILS # BLD AUTO: 0.02 X10(3)/MCL
BASOPHILS NFR BLD AUTO: 0.6 %
BILIRUB SERPL-MCNC: 0.7 MG/DL
BUN SERPL-MCNC: 21 MG/DL (ref 9.8–20.1)
CALCIUM SERPL-MCNC: 9.2 MG/DL (ref 8.4–10.2)
CHLORIDE SERPL-SCNC: 109 MMOL/L (ref 98–107)
CHOLEST SERPL-MCNC: 185 MG/DL
CHOLEST/HDLC SERPL: 3 {RATIO} (ref 0–5)
CO2 SERPL-SCNC: 26 MMOL/L (ref 23–31)
CREAT SERPL-MCNC: 0.73 MG/DL (ref 0.55–1.02)
CREAT/UREA NIT SERPL: 29
EOSINOPHIL # BLD AUTO: 0.2 X10(3)/MCL (ref 0–0.9)
EOSINOPHIL NFR BLD AUTO: 6.2 %
ERYTHROCYTE [DISTWIDTH] IN BLOOD BY AUTOMATED COUNT: 12.7 % (ref 11.5–17)
GFR SERPLBLD CREATININE-BSD FMLA CKD-EPI: >60 ML/MIN/1.73/M2
GLOBULIN SER-MCNC: 2.7 GM/DL (ref 2.4–3.5)
GLUCOSE SERPL-MCNC: 85 MG/DL (ref 82–115)
HCT VFR BLD AUTO: 42 % (ref 37–47)
HDLC SERPL-MCNC: 68 MG/DL (ref 35–60)
HGB BLD-MCNC: 13.3 G/DL (ref 12–16)
IMM GRANULOCYTES # BLD AUTO: 0 X10(3)/MCL (ref 0–0.04)
IMM GRANULOCYTES NFR BLD AUTO: 0 %
LDLC SERPL CALC-MCNC: 103 MG/DL (ref 50–140)
LYMPHOCYTES # BLD AUTO: 1.31 X10(3)/MCL (ref 0.6–4.6)
LYMPHOCYTES NFR BLD AUTO: 40.6 %
MCH RBC QN AUTO: 31.6 PG (ref 27–31)
MCHC RBC AUTO-ENTMCNC: 31.7 G/DL (ref 33–36)
MCV RBC AUTO: 99.8 FL (ref 80–94)
MONOCYTES # BLD AUTO: 0.27 X10(3)/MCL (ref 0.1–1.3)
MONOCYTES NFR BLD AUTO: 8.4 %
NEUTROPHILS # BLD AUTO: 1.43 X10(3)/MCL (ref 2.1–9.2)
NEUTROPHILS NFR BLD AUTO: 44.2 %
NRBC BLD AUTO-RTO: 0 %
PLATELET # BLD AUTO: 201 X10(3)/MCL (ref 130–400)
PMV BLD AUTO: 11.2 FL (ref 7.4–10.4)
POTASSIUM SERPL-SCNC: 4.2 MMOL/L (ref 3.5–5.1)
PROT SERPL-MCNC: 6.6 GM/DL (ref 5.8–7.6)
RBC # BLD AUTO: 4.21 X10(6)/MCL (ref 4.2–5.4)
SODIUM SERPL-SCNC: 143 MMOL/L (ref 136–145)
TRIGL SERPL-MCNC: 71 MG/DL (ref 37–140)
VLDLC SERPL CALC-MCNC: 14 MG/DL
WBC # BLD AUTO: 3.23 X10(3)/MCL (ref 4.5–11.5)

## 2025-04-23 PROCEDURE — 80061 LIPID PANEL: CPT | Performed by: NURSE PRACTITIONER

## 2025-04-23 PROCEDURE — 80053 COMPREHEN METABOLIC PANEL: CPT | Performed by: NURSE PRACTITIONER

## 2025-04-23 PROCEDURE — 85025 COMPLETE CBC W/AUTO DIFF WBC: CPT | Performed by: NURSE PRACTITIONER

## 2025-04-30 ENCOUNTER — OFFICE VISIT (OUTPATIENT)
Dept: FAMILY MEDICINE | Facility: CLINIC | Age: 70
End: 2025-04-30
Payer: MEDICARE

## 2025-04-30 VITALS
WEIGHT: 145 LBS | BODY MASS INDEX: 24.16 KG/M2 | TEMPERATURE: 98 F | SYSTOLIC BLOOD PRESSURE: 105 MMHG | RESPIRATION RATE: 18 BRPM | HEIGHT: 65 IN | HEART RATE: 71 BPM | DIASTOLIC BLOOD PRESSURE: 60 MMHG

## 2025-04-30 DIAGNOSIS — F32.89 OTHER DEPRESSION: ICD-10-CM

## 2025-04-30 DIAGNOSIS — M85.89 OSTEOPENIA OF MULTIPLE SITES: ICD-10-CM

## 2025-04-30 DIAGNOSIS — E78.5 HYPERLIPIDEMIA, UNSPECIFIED HYPERLIPIDEMIA TYPE: ICD-10-CM

## 2025-04-30 DIAGNOSIS — F41.9 ANXIETY: ICD-10-CM

## 2025-04-30 DIAGNOSIS — M79.2 PERIPHERAL NEUROPATHIC PAIN: ICD-10-CM

## 2025-04-30 DIAGNOSIS — M19.90 ARTHRITIS: ICD-10-CM

## 2025-04-30 DIAGNOSIS — D72.819 LEUKOPENIA, UNSPECIFIED TYPE: ICD-10-CM

## 2025-04-30 DIAGNOSIS — Z78.0 POST-MENOPAUSAL: ICD-10-CM

## 2025-04-30 DIAGNOSIS — M79.671 RIGHT FOOT PAIN: ICD-10-CM

## 2025-04-30 DIAGNOSIS — M54.2 NECK PAIN: ICD-10-CM

## 2025-04-30 PROCEDURE — 99214 OFFICE O/P EST MOD 30 MIN: CPT | Mod: ,,, | Performed by: NURSE PRACTITIONER

## 2025-04-30 PROCEDURE — 3074F SYST BP LT 130 MM HG: CPT | Mod: ,,, | Performed by: NURSE PRACTITIONER

## 2025-04-30 PROCEDURE — 3008F BODY MASS INDEX DOCD: CPT | Mod: ,,, | Performed by: NURSE PRACTITIONER

## 2025-04-30 PROCEDURE — 3078F DIAST BP <80 MM HG: CPT | Mod: ,,, | Performed by: NURSE PRACTITIONER

## 2025-04-30 PROCEDURE — 1159F MED LIST DOCD IN RCRD: CPT | Mod: ,,, | Performed by: NURSE PRACTITIONER

## 2025-04-30 PROCEDURE — 1160F RVW MEDS BY RX/DR IN RCRD: CPT | Mod: ,,, | Performed by: NURSE PRACTITIONER

## 2025-04-30 PROCEDURE — 1126F AMNT PAIN NOTED NONE PRSNT: CPT | Mod: ,,, | Performed by: NURSE PRACTITIONER

## 2025-04-30 RX ORDER — CYCLOBENZAPRINE HCL 10 MG
10 TABLET ORAL 3 TIMES DAILY PRN
Qty: 30 TABLET | Refills: 1 | Status: SHIPPED | OUTPATIENT
Start: 2025-04-30 | End: 2025-05-20

## 2025-04-30 RX ORDER — GABAPENTIN 300 MG/1
300 CAPSULE ORAL DAILY
Qty: 90 CAPSULE | Refills: 1 | Status: SHIPPED | OUTPATIENT
Start: 2025-04-30 | End: 2025-10-27

## 2025-04-30 RX ORDER — PITAVASTATIN CALCIUM 2.09 MG/1
2 TABLET, FILM COATED ORAL DAILY
Qty: 90 TABLET | Refills: 1 | Status: SHIPPED | OUTPATIENT
Start: 2025-04-30

## 2025-04-30 NOTE — PROGRESS NOTES
Subjective:       Patient ID: Audrey Braxton is a 70 y.o. female.    Chief Complaint: Follow-up (6 month follow up ), Results (Lab results ), and Medication Refill (Refills on medication )    The patient is a 70-year-old female who presents for a follow-up exam.    We went over the patient's lab results together.  Of concern is her white blood count.  Her white blood count continues to be low and is now 3.23.  I will refer her to Hematology.    Patient has multiple comorbid conditions including the following:  Peripheral neuropathy, anxiety, depression, hyperlipidemia, osteopenia of multiple sites.    The patient has chronic neck pain.  She was supposed to have surgical intervention due to the fact that she has...1. Multilevel encroachment into the central spinal canal and neural foramina as described; most severe at C6-7  2. Cervical spondylosis  3. Slight anterolisthesis of C4 on C5  4. Reversal of the normal lordotic curve with the apex at C5-6        Electronically signed by:Alejandro Prabhakar  Date:                                            07/26/2023    However she did not have surgical intervention and states it she now is not sure that she wants surgery on her neck.  She is requesting referral to Neurology.  I told her we will have do an x-ray and an MRI.  I ordered The x-ray.      Neck Pain   This is a chronic problem. The current episode started more than 1 year ago. The problem occurs daily. The problem has been unchanged. The pain is associated with an unknown factor. The pain is present in the occipital region, right side and left side. The quality of the pain is described as aching, cramping and shooting. The pain is at a severity of 9/10. The pain is severe. The symptoms are aggravated by bending, position, coughing, twisting, sneezing and swallowing. The pain is Worse during the night. Stiffness is present In the morning. Associated symptoms include headaches, numbness and tingling. She has tried  "acetaminophen, home exercises, heat, ice, neck support and NSAIDs for the symptoms.       Review of Systems   Musculoskeletal:  Positive for neck pain.   Neurological:  Positive for tingling, numbness and headaches.    12 point review of systems conducted, negative except as stated in the history of present illness. See HPI for details.        Objective:        Visit Vitals  /60   Pulse 71   Temp 97.9 °F (36.6 °C) (Temporal)   Resp 18   Ht 5' 5" (1.651 m)   Wt 65.8 kg (145 lb)   BMI 24.13 kg/m²        Physical Exam  Vitals and nursing note reviewed.   HENT:      Head: Normocephalic.      Right Ear: Tympanic membrane normal.      Left Ear: Tympanic membrane normal.      Nose: Nose normal.      Mouth/Throat:      Mouth: Mucous membranes are moist.   Eyes:      Extraocular Movements: Extraocular movements intact.   Cardiovascular:      Rate and Rhythm: Normal rate and regular rhythm.      Heart sounds: No murmur heard.  Pulmonary:      Effort: Pulmonary effort is normal.      Breath sounds: Normal breath sounds.   Abdominal:      General: Bowel sounds are normal.      Palpations: Abdomen is soft.   Musculoskeletal:      Cervical back: Rigidity and crepitus present. Pain with movement and muscular tenderness present.      Comments: Limited range of motion on flexion and extension of neck due to pain   Skin:     General: Skin is warm and dry.   Neurological:      Mental Status: She is alert and oriented to person, place, and time.           Assessment:           ICD-10-CM ICD-9-CM   1. Hyperlipidemia, unspecified hyperlipidemia type  E78.5 272.4   2. Leukopenia, unspecified type  D72.819 288.50   3. Neck pain  M54.2 723.1   4. Peripheral neuropathic pain  M79.2 729.2   5. Right foot pain  M79.671 729.5   6. Arthritis  M19.90 716.90   7. Anxiety  F41.9 300.00   8. Other depression  F32.89 311   9. Post-menopausal  Z78.0 V49.81   10. Osteopenia of multiple sites  M85.89 733.90            Plan:         1. " Hyperlipidemia, unspecified hyperlipidemia type  Hyperlipidemia  - tolerating medication  - no myalgia  - watching diet  - LIVALO 2 mg Tab tablet; Take 1 tablet (2 mg total) by mouth once daily.  Dispense: 90 tablet; Refill: 1    2. Leukopenia, unspecified type  - Ambulatory referral/consult to Hematology / Oncology; Future    3. Neck pain  - Ambulatory referral/consult to Neurology  - X-Ray Cervical Spine AP And Lateral; Future  - cyclobenzaprine (FLEXERIL) 10 MG tablet; Take 1 tablet (10 mg total) by mouth 3 (three) times daily as needed for Muscle spasms.  Dispense: 30 tablet; Refill: 1  - gabapentin (NEURONTIN) 300 MG capsule; Take 1 capsule (300 mg total) by mouth Daily.  Dispense: 90 capsule; Refill: 1    4. Peripheral neuropathic pain  - gabapentin (NEURONTIN) 300 MG capsule; Take 1 capsule (300 mg total) by mouth Daily.  Dispense: 90 capsule; Refill: 1    5. Right foot pain  - gabapentin (NEURONTIN) 300 MG capsule; Take 1 capsule (300 mg total) by mouth Daily.  Dispense: 90 capsule; Refill: 1    6. Arthritis  - gabapentin (NEURONTIN) 300 MG capsule; Take 1 capsule (300 mg total) by mouth Daily.  Dispense: 90 capsule; Refill: 1    7. Anxiety  Controlled  Continue Ativan as prescribed    8. Other depression  Controlled  Continue duloxetine as prescribed    9. Post-menopausal  Stable  Patient sees gyn physician    10. Osteopenia of multiple sites  Stable  The patient is taking calcium plus D 5000 IU daily          Follow up in about 6 months (around 10/30/2025).     Future Appointments   Date Time Provider Department Center   10/22/2025  8:00 AM NURSE, [facility]   10/29/2025  1:00 PM Kailey Russell NP [facility]        Past Medical History:   Diagnosis Date    Allergy     Anxiety     Cataract     Degenerative disc disease, cervical     Depression     Hyperlipidemia         Review of patient's allergies indicates:   Allergen Reactions    Amoxicillin-pot clavulanate  Diarrhea        Current Outpatient Medications   Medication Instructions    acetaminophen (TYLENOL) 650 mg, Daily    alendronate (FOSAMAX) 70 mg, Oral, Every 7 days    ATIVAN 2 mg Tab 2.5 tablets, Nightly    azelastine (ASTELIN) 137 mcg, Nasal, 2 times daily    cholecalciferol (vitamin D3) 5,000 Units, Daily    cyclobenzaprine (FLEXERIL) 10 mg, Oral, 3 times daily PRN    DULoxetine 20 mg CDRS 1 tablet, Daily    gabapentin (NEURONTIN) 300 mg, Oral, Daily    hydrocortisone 2.5 % cream No dose, route, or frequency recorded.    ketoconazole (NIZORAL) 2 % shampoo 1 application , Twice weekly    lamotrigine2.5% meloxicam 0.09% LIDOcaine2% prilocaine2% topical cream 1 g, 5 times daily    LIVALO 2 mg, Oral, Daily    loratadine (CLARITIN) 10 mg tablet 1 tablet, Daily    mometasone (ELOCON) 0.1 % solution 1 Application, Nightly          Patient Active Problem List   Diagnosis    Post-menopausal    Hyperlipidemia    Anxiety    Depression    Peripheral neuropathic pain    Arthritis    Osteopenia of multiple sites    Other screening mammogram    Other biomechanical lesions of head region    Leukopenia    Neck pain    Right foot pain             Past Medical History:   Diagnosis Date    Allergy     Anxiety     Cataract     Degenerative disc disease, cervical     Depression     Hyperlipidemia           Past Surgical History:   Procedure Laterality Date    CATARACT EXTRACTION      CHOLECYSTECTOMY      COLONOSCOPY N/A 6/20/2024    Procedure: COLONOSCOPY;  Surgeon: Margarito Schaffer MD;  Location: [facility];  Service: Gastroenterology;  Laterality: N/A;    EYE SURGERY             reports that she has been smoking cigarettes. She has been exposed to tobacco smoke. She has never used smokeless tobacco. She reports current alcohol use of about 1.0 standard drink of alcohol per week. She reports that she does not use drugs.      There is no immunization history on file for this patient.     Health Maintenance   Topic Date Due    TETANUS  VACCINE  Never done    Pneumococcal Vaccines (Age 50+) (1 of 2 - PCV) Never done    Shingles Vaccine (1 of 2) Never done    RSV Vaccine (Age 60+ and Pregnant patients) (1 - Risk 60-74 years 1-dose series) Never done    COVID-19 Vaccine (1 - 2024-25 season) Never done    Mammogram  06/04/2025    DEXA Scan  06/30/2025    Lipid Panel  04/23/2030    Colorectal Cancer Screening  06/20/2034    Hepatitis C Screening  Completed    Influenza Vaccine  Addressed

## 2025-05-09 NOTE — PROGRESS NOTES
HEMATOLOGY/ONCOLOGY OFFICE CLINIC VISIT    Visit Information: new patient    Initial Evaluation:  05/13/2025  Referring Provider: Kailey Russell NP  Other providers:  Code status:  Not addressed    Diagnosis:   Leukopenia     Present treatment:    Treatment/Oncology history:    Plan of care:     Imaging:    Pathology:      CLINICAL HISTORY:       Patient: Audrey Braxton is a 70 y.o. female with history of hyperlipidemia, neuropathy, depression and anxiety kindly referred by Kailey Russell NP for further evaluation of leukopenia.     Reviewing electronic medical record patient leukopenia started around March of 2024.  Count has been very slowly trending down.  She is not anemic and platelets are normal but she does have macrocytosis evident since August of 2019.  Reports occasional night sweats, started about 6 months ago.  Denies unintentional weight loss.   Denies family hx of blood cancers.      She denies any fever, chills, sweats.  No chest pain or short of breath.    Chief Complaint: Leukopenia (No complaints. )      Interval History:      Past Medical History:   Diagnosis Date    Allergy     Anxiety     Cataract     Degenerative disc disease, cervical     Depression     Hyperlipidemia     Neuropathy       Past Surgical History:   Procedure Laterality Date    CATARACT EXTRACTION      CHOLECYSTECTOMY      COLONOSCOPY N/A 6/20/2024    Procedure: COLONOSCOPY;  Surgeon: Margarito Schaffer MD;  Location: [facility];  Service: Gastroenterology;  Laterality: N/A;    EYE SURGERY       Family History   Problem Relation Name Age of Onset    Diabetes Mother      Stroke Mother      Hypertension Mother      Stroke Father            Review of patient's allergies indicates:   Allergen Reactions    Amoxicillin-pot clavulanate Diarrhea      Medications Ordered Prior to Encounter[1]   Review of Systems   Constitutional:  Negative for activity change, appetite change, chills, fatigue, fever, night sweats and unexpected weight change.  "  HENT:  Negative for mouth dryness, mouth sores, nosebleeds, sore throat and trouble swallowing.    Eyes: Negative.    Respiratory:  Negative for cough and shortness of breath.    Cardiovascular:  Negative for chest pain, palpitations and leg swelling.   Gastrointestinal:  Negative for abdominal distention, abdominal pain, blood in stool, change in bowel habit, constipation, diarrhea, nausea and vomiting.   Endocrine: Negative.    Genitourinary:  Negative for dysuria, frequency, hematuria and urgency.   Musculoskeletal:  Negative for arthralgias, back pain, myalgias and neck pain.   Integumentary:  Negative for rash.   Neurological:  Negative for dizziness, tremors, syncope, speech difficulty, weakness, light-headedness, numbness, headaches and memory loss.   Hematological:  Does not bruise/bleed easily.   Psychiatric/Behavioral:  Negative for confusion and suicidal ideas.               Vitals:    05/13/25 1414   BP: 118/79   BP Location: Left arm   Patient Position: Sitting   Pulse: (!) 54   Resp: 16   Temp: 98.1 °F (36.7 °C)   TempSrc: Oral   SpO2: 96%   Weight: 65.5 kg (144 lb 4.8 oz)   Height: 5' 5" (1.651 m)      Wt Readings from Last 6 Encounters:   05/13/25 65.5 kg (144 lb 4.8 oz)   04/30/25 65.8 kg (145 lb)   01/24/25 64.4 kg (142 lb)   10/08/24 64 kg (141 lb)   06/18/24 64 kg (141 lb)   03/27/24 66.7 kg (147 lb)     Body mass index is 24.01 kg/m².  Body surface area is 1.73 meters squared.  Physical Exam  Vitals and nursing note reviewed.   Constitutional:       General: She is not in acute distress.     Appearance: Normal appearance. She is well-developed.   HENT:      Head: Normocephalic and atraumatic.      Mouth/Throat:      Mouth: Mucous membranes are moist.   Eyes:      General: No scleral icterus.     Extraocular Movements: Extraocular movements intact.      Conjunctiva/sclera: Conjunctivae normal.      Pupils: Pupils are equal, round, and reactive to light.   Neck:      Vascular: No JVD. "   Cardiovascular:      Rate and Rhythm: Normal rate and regular rhythm.      Heart sounds: No murmur heard.  Pulmonary:      Effort: Pulmonary effort is normal.      Breath sounds: Normal breath sounds. No wheezing or rhonchi.   Abdominal:      General: Bowel sounds are normal. There is no distension.      Palpations: Abdomen is soft. There is no mass.      Tenderness: There is no abdominal tenderness.   Musculoskeletal:         General: No swelling or deformity.      Cervical back: Neck supple.   Lymphadenopathy:      Head:      Right side of head: No submental or submandibular adenopathy.      Left side of head: No submental or submandibular adenopathy.      Cervical: No cervical adenopathy.      Lower Body: No right inguinal adenopathy. No left inguinal adenopathy.   Skin:     General: Skin is warm.      Coloration: Skin is not jaundiced.      Findings: No lesion or rash.      Nails: There is no clubbing.   Neurological:      General: No focal deficit present.      Mental Status: She is alert and oriented to person, place, and time.      Cranial Nerves: Cranial nerves 2-12 are intact.   Psychiatric:         Attention and Perception: Attention normal.         Behavior: Behavior is cooperative.         Judgment: Judgment normal.       Laboratory:  CBC with Differential:  Lab Results   Component Value Date    WBC 3.23 (L) 04/23/2025    RBC 4.21 04/23/2025    HGB 13.3 04/23/2025    HCT 42.0 04/23/2025    MCV 99.8 (H) 04/23/2025    MCH 31.6 (H) 04/23/2025    MCHC 31.7 (L) 04/23/2025    RDW 12.7 04/23/2025     04/23/2025    MPV 11.2 (H) 04/23/2025        CMP:  Sodium   Date Value Ref Range Status   04/23/2025 143 136 - 145 mmol/L Final     Potassium   Date Value Ref Range Status   04/23/2025 4.2 3.5 - 5.1 mmol/L Final     Chloride   Date Value Ref Range Status   04/23/2025 109 (H) 98 - 107 mmol/L Final     CO2   Date Value Ref Range Status   04/23/2025 26 23 - 31 mmol/L Final     Glucose   Date Value Ref Range  Status   04/23/2025 85 82 - 115 mg/dL Final     Blood Urea Nitrogen   Date Value Ref Range Status   04/23/2025 21.0 (H) 9.8 - 20.1 mg/dL Final     Creatinine   Date Value Ref Range Status   04/23/2025 0.73 0.55 - 1.02 mg/dL Final     Calcium   Date Value Ref Range Status   04/23/2025 9.2 8.4 - 10.2 mg/dL Final     Protein Total   Date Value Ref Range Status   04/23/2025 6.6 5.8 - 7.6 gm/dL Final     Albumin   Date Value Ref Range Status   04/23/2025 3.9 3.4 - 4.8 g/dL Final     Bilirubin Total   Date Value Ref Range Status   04/23/2025 0.7 <=1.5 mg/dL Final     ALP   Date Value Ref Range Status   04/23/2025 67 40 - 150 unit/L Final     AST   Date Value Ref Range Status   04/23/2025 22 11 - 45 unit/L Final     ALT   Date Value Ref Range Status   04/23/2025 22 0 - 55 unit/L Final     Estimated GFR-Non    Date Value Ref Range Status   06/14/2022 >60 mls/min/1.73/m2 Final       Latest Reference Range & Units 01/10/24 11:36 03/13/24 08:48 09/13/24 08:07 04/23/25 08:04   WBC 4.50 - 11.50 x10(3)/mcL  4.36 (L) 3.79 (L) 3.23 (L)   RBC 4.20 - 5.40 x10(6)/mcL  4.36 4.51 4.21   Hemoglobin 12.0 - 16.0 g/dL 13.6 14.0 14.3 13.3   Hematocrit 37.0 - 47.0 % 42.3 43.1 45.5 42.0   MCV 80.0 - 94.0 fL  98.9 (H) 100.9 (H) 99.8 (H)   MCH 27.0 - 31.0 pg  32.1 (H) 31.7 (H) 31.6 (H)   MCHC 33.0 - 36.0 g/dL  32.5 (L) 31.4 (L) 31.7 (L)   RDW 11.5 - 17.0 %  13.1 13.3 12.7   Platelet Count 130 - 400 x10(3)/mcL 209 225 205 201   MPV 7.4 - 10.4 fL  11.3 (H) 11.0 (H) 11.2 (H)   Neut % %  45.8 47.0 44.2   LYMPH % %  40.6 40.9 40.6   Mono % %  7.6 7.9 8.4   Eos % %  5.3 3.4 6.2   Basophil % %  0.7 0.8 0.6   Immature Granulocytes %  0.0 0.0 0.0   Neut # 2.1 - 9.2 x10(3)/mcL  2.00 (L) 1.78 (L) 1.43 (L)          Assessment:       1. Leukopenia, unspecified type    2. Macrocytosis without anemia    3. Other abnormality of red blood cells        Discussed with patient that leukopenia refers to a decreased in the total number of WBCs due to  any cause. It can decrease in disease-fighting cells circulating in the blood and increase chances of infections.  A low white blood cell count usually is caused by one of the following: Viral infections or other Infectious diseases,  congenital disorders characterized by diminished bone marrow function, cancer or other malignancies that damage bone marrow, ie, Leukemia, lymphoma, MDS, etc. Autoimmune disorders that destroy white blood cells or bone marrow cells, Lupus, RA, etc. Drugs or Certain medications, such as antibiotics, antiseizure and diuretics. Hypersplenism, a premature destruction of blood cells by the spleen.  Sometimes leukopenia can be secondary to nutritional deficit, i.e.,  Iron deficiency anemia, B12 or folate deficiency, etc. Sometimes endocrine disorder like thyroid disease as well.           Plan:       Labs today  Order for abdominal u/s to further evaluate  Defer BMBx for now pending lab results  RTC in 2-3 weeks via telemedicine with MD for FU/labs/results  [facility]- 2 hrs prior @ [facility]    The patient was seen, interviewed and examined. Pertinent lab and radiology studies were reviewed.   The patient was given ample opportunity to ask questions, and to the best of my abilities, all questions answered to satisfaction; patient demonstrated understanding of what we discussed and agreeable to the plan. Pt instructed to call should develop concerning signs/symptoms or have further questions.      I'd like to thank for referring and allowing me the opportunity to participate in the care of this patient and if any questions, please do not hesitate to call the office at [phone].    Visit today included increased complexity associated with the care of the episodic problem leukopenia, addressing and managing the longitudinal care of the patient's leukopenia.     Irina Thompson MD  Hematology/Oncology  [facility]     Professional Services   ILibby LPN, acted solely as a  scribe for and in the presence of Dr. Irina Thompson, who performed these services.     I spent a total of 60 minutes on the day of the visit.This includes face to face time and non-face to face time preparing to see the patient (eg, review of tests), obtaining and/or reviewing separately obtained history, documenting clinical information in the electronic or other health record, independently interpreting results and communicating results to the patient/family/caregiver, or care coordinator.             [1]   Current Outpatient Medications on File Prior to Visit   Medication Sig Dispense Refill    acetaminophen (TYLENOL) 650 MG TbSR Take 650 mg by mouth Daily.      cholecalciferol, vitamin D3, 125 mcg (5,000 unit) Tab Take 5,000 Units by mouth once daily.      DULoxetine 20 mg CDRS Take 1 tablet by mouth Daily.      gabapentin (NEURONTIN) 300 MG capsule Take 1 capsule (300 mg total) by mouth Daily. 90 capsule 1    glucosamine-chondroitin 500-400 mg tablet Take 1 tablet by mouth 3 (three) times daily.      LIVALO 2 mg Tab tablet Take 1 tablet (2 mg total) by mouth once daily. 90 tablet 1    loratadine (CLARITIN) 10 mg tablet Take 1 tablet by mouth Daily.      montelukast (SINGULAIR) 10 mg tablet       omega-3 fatty acids/fish oil (FISH OIL-OMEGA-3 FATTY ACIDS) 300-1,000 mg capsule Take by mouth once daily.      [DISCONTINUED] alendronate (FOSAMAX) 70 MG tablet Take 1 tablet (70 mg total) by mouth every 7 days. (Patient not taking: Reported on 5/13/2025) 12 tablet 3    [DISCONTINUED] ATIVAN 2 mg Tab Take 2.5 tablets by mouth every evening. (Patient not taking: Reported on 5/13/2025)      [DISCONTINUED] azelastine (ASTELIN) 137 mcg (0.1 %) nasal spray 1 spray (137 mcg total) by Nasal route 2 (two) times daily. (Patient not taking: Reported on 5/13/2025) 3 mL 6    [DISCONTINUED] cyclobenzaprine (FLEXERIL) 10 MG tablet Take 1 tablet (10 mg total) by mouth 3 (three) times daily as needed for Muscle spasms. (Patient not  taking: Reported on 5/13/2025) 30 tablet 1    [DISCONTINUED] hydrocortisone 2.5 % cream  (Patient not taking: Reported on 5/13/2025)      [DISCONTINUED] ketoconazole (NIZORAL) 2 % shampoo Apply 1 application  topically twice a week. (Patient not taking: Reported on 5/13/2025)      [DISCONTINUED] lamotrigine2.5% meloxicam 0.09% LIDOcaine2% prilocaine2% topical cream Apply 1 g topically 5 (five) times daily. Apply 1 or 2 pumps to painful area up to 5 times daily.  Maximum 15 pumps/day. (Patient not taking: Reported on 5/13/2025)      [DISCONTINUED] mometasone (ELOCON) 0.1 % solution Apply 1 Application topically every evening. (Patient not taking: Reported on 5/13/2025)       No current facility-administered medications on file prior to visit.

## 2025-05-12 DIAGNOSIS — M54.2 NECK PAIN: Primary | ICD-10-CM

## 2025-05-13 ENCOUNTER — OFFICE VISIT (OUTPATIENT)
Facility: CLINIC | Age: 70
End: 2025-05-13
Payer: MEDICARE

## 2025-05-13 ENCOUNTER — LAB VISIT (OUTPATIENT)
Dept: LAB | Facility: HOSPITAL | Age: 70
End: 2025-05-13
Payer: MEDICARE

## 2025-05-13 VITALS
HEIGHT: 65 IN | BODY MASS INDEX: 24.04 KG/M2 | OXYGEN SATURATION: 96 % | HEART RATE: 54 BPM | TEMPERATURE: 98 F | WEIGHT: 144.31 LBS | RESPIRATION RATE: 16 BRPM | SYSTOLIC BLOOD PRESSURE: 118 MMHG | DIASTOLIC BLOOD PRESSURE: 79 MMHG

## 2025-05-13 DIAGNOSIS — D75.89 MACROCYTOSIS WITHOUT ANEMIA: ICD-10-CM

## 2025-05-13 DIAGNOSIS — R71.8 OTHER ABNORMALITY OF RED BLOOD CELLS: ICD-10-CM

## 2025-05-13 DIAGNOSIS — D78.01 INTRAOPERATIVE HEMORRHAGE AND HEMATOMA OF SPLEEN COMPLICATING PROCEDURE ON SPLEEN: Primary | ICD-10-CM

## 2025-05-13 DIAGNOSIS — D72.819 LEUKOPENIA, UNSPECIFIED TYPE: Primary | ICD-10-CM

## 2025-05-13 DIAGNOSIS — D72.819 LEUKOPENIA, UNSPECIFIED TYPE: ICD-10-CM

## 2025-05-13 LAB
ALBUMIN SERPL-MCNC: 4.1 G/DL (ref 3.4–4.8)
ALBUMIN/GLOB SERPL: 1.4 RATIO (ref 1.1–2)
ALP SERPL-CCNC: 66 UNIT/L (ref 40–150)
ALT SERPL-CCNC: 22 UNIT/L (ref 0–55)
ANION GAP SERPL CALC-SCNC: 8 MEQ/L
AST SERPL-CCNC: 22 UNIT/L (ref 11–45)
BASOPHILS # BLD AUTO: 0.04 X10(3)/MCL
BASOPHILS NFR BLD AUTO: 1.1 %
BILIRUB SERPL-MCNC: 0.8 MG/DL
BUN SERPL-MCNC: 16 MG/DL (ref 9.8–20.1)
CALCIUM SERPL-MCNC: 9.5 MG/DL (ref 8.4–10.2)
CHLORIDE SERPL-SCNC: 107 MMOL/L (ref 98–107)
CO2 SERPL-SCNC: 27 MMOL/L (ref 23–31)
CREAT SERPL-MCNC: 0.72 MG/DL (ref 0.55–1.02)
CREAT/UREA NIT SERPL: 22
EOSINOPHIL # BLD AUTO: 0.11 X10(3)/MCL (ref 0–0.9)
EOSINOPHIL NFR BLD AUTO: 2.9 %
ERYTHROCYTE [DISTWIDTH] IN BLOOD BY AUTOMATED COUNT: 12.2 % (ref 11.5–17)
FERRITIN SERPL-MCNC: 148.15 NG/ML (ref 4.63–204)
GFR SERPLBLD CREATININE-BSD FMLA CKD-EPI: >60 ML/MIN/1.73/M2
GLOBULIN SER-MCNC: 3 GM/DL (ref 2.4–3.5)
GLUCOSE SERPL-MCNC: 89 MG/DL (ref 82–115)
HCT VFR BLD AUTO: 42.7 % (ref 37–47)
HGB BLD-MCNC: 14.3 G/DL (ref 12–16)
IMM GRANULOCYTES # BLD AUTO: 0.01 X10(3)/MCL (ref 0–0.04)
IMM GRANULOCYTES NFR BLD AUTO: 0.3 %
IRON SATN MFR SERPL: 54 % (ref 20–50)
IRON SERPL-MCNC: 143 UG/DL (ref 50–170)
LDH SERPL-CCNC: 154 U/L (ref 125–220)
LYMPHOCYTES # BLD AUTO: 1.37 X10(3)/MCL (ref 0.6–4.6)
LYMPHOCYTES NFR BLD AUTO: 36.3 %
MCH RBC QN AUTO: 32.4 PG (ref 27–31)
MCHC RBC AUTO-ENTMCNC: 33.5 G/DL (ref 33–36)
MCV RBC AUTO: 96.8 FL (ref 80–94)
MONOCYTES # BLD AUTO: 0.3 X10(3)/MCL (ref 0.1–1.3)
MONOCYTES NFR BLD AUTO: 8 %
NEUTROPHILS # BLD AUTO: 1.94 X10(3)/MCL (ref 2.1–9.2)
NEUTROPHILS NFR BLD AUTO: 51.4 %
NRBC BLD AUTO-RTO: 0 %
PLATELET # BLD AUTO: 207 X10(3)/MCL (ref 130–400)
PMV BLD AUTO: 10.3 FL (ref 7.4–10.4)
POTASSIUM SERPL-SCNC: 4.2 MMOL/L (ref 3.5–5.1)
PROT SERPL-MCNC: 7.1 GM/DL (ref 5.8–7.6)
RBC # BLD AUTO: 4.41 X10(6)/MCL (ref 4.2–5.4)
SODIUM SERPL-SCNC: 142 MMOL/L (ref 136–145)
TIBC SERPL-MCNC: 122 UG/DL (ref 70–310)
TIBC SERPL-MCNC: 265 UG/DL (ref 250–450)
WBC # BLD AUTO: 3.77 X10(3)/MCL (ref 4.5–11.5)

## 2025-05-13 PROCEDURE — 3288F FALL RISK ASSESSMENT DOCD: CPT | Mod: CPTII,S$GLB,, | Performed by: INTERNAL MEDICINE

## 2025-05-13 PROCEDURE — 3008F BODY MASS INDEX DOCD: CPT | Mod: CPTII,S$GLB,, | Performed by: INTERNAL MEDICINE

## 2025-05-13 PROCEDURE — 99203 OFFICE O/P NEW LOW 30 MIN: CPT | Mod: S$GLB,,, | Performed by: INTERNAL MEDICINE

## 2025-05-13 PROCEDURE — 83550 IRON BINDING TEST: CPT

## 2025-05-13 PROCEDURE — 1160F RVW MEDS BY RX/DR IN RCRD: CPT | Mod: CPTII,S$GLB,, | Performed by: INTERNAL MEDICINE

## 2025-05-13 PROCEDURE — 80053 COMPREHEN METABOLIC PANEL: CPT

## 2025-05-13 PROCEDURE — 1159F MED LIST DOCD IN RCRD: CPT | Mod: CPTII,S$GLB,, | Performed by: INTERNAL MEDICINE

## 2025-05-13 PROCEDURE — 82784 ASSAY IGA/IGD/IGG/IGM EACH: CPT

## 2025-05-13 PROCEDURE — 99999 PR PBB SHADOW E&M-EST. PATIENT-LVL IV: CPT | Mod: PBBFAC,,, | Performed by: INTERNAL MEDICINE

## 2025-05-13 PROCEDURE — 82746 ASSAY OF FOLIC ACID SERUM: CPT

## 2025-05-13 PROCEDURE — 82728 ASSAY OF FERRITIN: CPT

## 2025-05-13 PROCEDURE — 86235 NUCLEAR ANTIGEN ANTIBODY: CPT

## 2025-05-13 PROCEDURE — 86334 IMMUNOFIX E-PHORESIS SERUM: CPT

## 2025-05-13 PROCEDURE — 1126F AMNT PAIN NOTED NONE PRSNT: CPT | Mod: CPTII,S$GLB,, | Performed by: INTERNAL MEDICINE

## 2025-05-13 PROCEDURE — 82525 ASSAY OF COPPER: CPT

## 2025-05-13 PROCEDURE — 1101F PT FALLS ASSESS-DOCD LE1/YR: CPT | Mod: CPTII,S$GLB,, | Performed by: INTERNAL MEDICINE

## 2025-05-13 PROCEDURE — 85025 COMPLETE CBC W/AUTO DIFF WBC: CPT

## 2025-05-13 PROCEDURE — 3074F SYST BP LT 130 MM HG: CPT | Mod: CPTII,S$GLB,, | Performed by: INTERNAL MEDICINE

## 2025-05-13 PROCEDURE — 3078F DIAST BP <80 MM HG: CPT | Mod: CPTII,S$GLB,, | Performed by: INTERNAL MEDICINE

## 2025-05-13 PROCEDURE — 83521 IG LIGHT CHAINS FREE EACH: CPT | Mod: 91

## 2025-05-13 PROCEDURE — 83615 LACTATE (LD) (LDH) ENZYME: CPT

## 2025-05-13 PROCEDURE — 36415 COLL VENOUS BLD VENIPUNCTURE: CPT

## 2025-05-13 RX ORDER — MONTELUKAST SODIUM 10 MG/1
TABLET ORAL
COMMUNITY
Start: 2025-04-07

## 2025-05-13 RX ORDER — GLUCOSAMINE/CHONDRO SU A 500-400 MG
1 TABLET ORAL 3 TIMES DAILY
COMMUNITY

## 2025-05-13 RX ORDER — MULTIVITAMIN WITH IRON
TABLET ORAL DAILY
COMMUNITY

## 2025-05-14 LAB
ALBUMIN % SPEP (OHS): 60.65 (ref 48.1–59.5)
ALBUMIN SERPL-MCNC: 4 G/DL (ref 3.4–4.8)
ALBUMIN/GLOB SERPL: 1.5 RATIO (ref 1.1–2)
ALPHA 1 GLOB (OHS): 0.26 GM/DL (ref 0–0.4)
ALPHA 1 GLOB% (OHS): 3.89 (ref 2.3–4.9)
ALPHA 2 GLOB % (OHS): 11.01 (ref 6.9–13)
ALPHA 2 GLOB (OHS): 0.73 GM/DL (ref 0.4–1)
BETA GLOB (OHS): 1 GM/DL (ref 0.7–1.3)
BETA GLOB% (OHS): 15.14 (ref 13.8–19.7)
FOLATE SERPL-MCNC: 14.8 NG/ML (ref 7–31.4)
GAMMA GLOBULIN % (OHS): 9.32 (ref 10.1–21.9)
GAMMA GLOBULIN (OHS): 0.62 GM/DL (ref 0.4–1.8)
GLOBULIN SER-MCNC: 2.6 GM/DL (ref 2.4–3.5)
HEMATOLOGIST REVIEW: NORMAL
IGA SERPL-MCNC: 142 MG/DL (ref 69–517)
IGG SERPL-MCNC: 625 MG/DL (ref 522–1631)
IGM SERPL-MCNC: 23 MG/DL (ref 33–293)
M SPIKE % (OHS): ABNORMAL
M SPIKE (OHS): ABNORMAL
PATH REV: NORMAL
PROT SERPL-MCNC: 6.6 GM/DL (ref 5.8–7.6)

## 2025-05-15 LAB
KAPPA LC FREE SER NEPH-MCNC: 1.32 MG/DL (ref 0.33–1.94)
KAPPA LC FREE/LAMBDA FREE SER NEPH: 1.16 {RATIO} (ref 0.26–1.65)
LAMBDA LC FREE SERPL-MCNC: 1.14 MG/DL (ref 0.57–2.63)

## 2025-05-16 ENCOUNTER — HOSPITAL ENCOUNTER (OUTPATIENT)
Dept: RADIOLOGY | Facility: HOSPITAL | Age: 70
Discharge: HOME | End: 2025-05-16
Attending: NURSE PRACTITIONER
Payer: MEDICARE

## 2025-05-16 DIAGNOSIS — M54.2 NECK PAIN: ICD-10-CM

## 2025-05-16 LAB
ANTINUCLEAR ANTIBODY SCREEN (OHS): NEGATIVE
CENTROMERE QUANT (OHS): <0.4 U/ML
COPPER SERPL-MCNC: 99 MCG/DL (ref 77–206)
DSDNA AB QUANT (OHS): <0.6 IU/ML
JO-1 AB QUANT (OHS): <0.3 U/ML
RNP70 AB QUANT (OHS): 0.4 U/ML
SCL-70S AB QUANT (OHS): <0.6 U/ML
SMITH AB QUANT (OHS): <0.7 U/ML
SSA(RO) AB QUANT (OHS): <0.4 U/ML
SSB(LA) AB QUANT (OHS): <0.4 U/ML
U1RNP AB QUANT (OHS): 0.7 U/ML

## 2025-05-16 PROCEDURE — 72040 X-RAY EXAM NECK SPINE 2-3 VW: CPT | Mod: TC

## 2025-05-20 ENCOUNTER — TELEPHONE (OUTPATIENT)
Dept: FAMILY MEDICINE | Facility: CLINIC | Age: 70
End: 2025-05-20
Payer: MEDICARE

## 2025-05-20 NOTE — TELEPHONE ENCOUNTER
I called the patient the x-ray results with her.  The findings are as follows:       FINDINGS:  The cervical lordosis is preserved.  The vertebral body heights are maintained without evidence of an acute fracture.  The prevertebral soft tissues are unremarkable there is grade 1 anterolisthesis of C4 on C5 and C7 on T1.  Moderate to severe spondylosis of the cervical spine with multi level marginal endplate osteophytes, facet arthropathy, and uncovertebral spurring.  There is moderate to severe disc height loss of C5-6 and C6-7 with moderate disc height loss of C7-T1 and C4-C5.  Lung apices are clear.  The paraspinal soft tissues are unremarkable.     Impression:     No acute osseous findings.  Stable degenerative changes as described in the body of the report.        Electronically signed by:Speedy Chandra  Date:                                            05/17/2025    The patient is seeing neurosurgery future date.

## 2025-05-21 ENCOUNTER — HOSPITAL ENCOUNTER (OUTPATIENT)
Dept: RADIOLOGY | Facility: HOSPITAL | Age: 70
Discharge: HOME | End: 2025-05-21
Attending: INTERNAL MEDICINE
Payer: MEDICARE

## 2025-05-21 DIAGNOSIS — R71.8 OTHER ABNORMALITY OF RED BLOOD CELLS: ICD-10-CM

## 2025-05-21 DIAGNOSIS — D72.819 LEUKOPENIA, UNSPECIFIED TYPE: ICD-10-CM

## 2025-05-21 PROCEDURE — 76700 US EXAM ABDOM COMPLETE: CPT | Mod: TC

## 2025-05-26 ENCOUNTER — OFFICE VISIT (OUTPATIENT)
Dept: NEUROSURGERY | Facility: CLINIC | Age: 70
End: 2025-05-26
Payer: MEDICARE

## 2025-05-26 VITALS
HEIGHT: 65 IN | TEMPERATURE: 98 F | DIASTOLIC BLOOD PRESSURE: 79 MMHG | RESPIRATION RATE: 18 BRPM | HEART RATE: 53 BPM | WEIGHT: 142 LBS | SYSTOLIC BLOOD PRESSURE: 124 MMHG | BODY MASS INDEX: 23.66 KG/M2

## 2025-05-26 DIAGNOSIS — M54.2 NECK PAIN: ICD-10-CM

## 2025-05-26 DIAGNOSIS — M48.02 SPINAL STENOSIS, CERVICAL REGION: Primary | ICD-10-CM

## 2025-05-26 DIAGNOSIS — M54.9 DORSALGIA, UNSPECIFIED: ICD-10-CM

## 2025-05-26 PROCEDURE — 99204 OFFICE O/P NEW MOD 45 MIN: CPT | Mod: ,,, | Performed by: PHYSICIAN ASSISTANT

## 2025-05-26 PROCEDURE — 1101F PT FALLS ASSESS-DOCD LE1/YR: CPT | Mod: CPTII,,, | Performed by: PHYSICIAN ASSISTANT

## 2025-05-26 PROCEDURE — 1160F RVW MEDS BY RX/DR IN RCRD: CPT | Mod: CPTII,,, | Performed by: PHYSICIAN ASSISTANT

## 2025-05-26 PROCEDURE — 3288F FALL RISK ASSESSMENT DOCD: CPT | Mod: CPTII,,, | Performed by: PHYSICIAN ASSISTANT

## 2025-05-26 PROCEDURE — 1125F AMNT PAIN NOTED PAIN PRSNT: CPT | Mod: CPTII,,, | Performed by: PHYSICIAN ASSISTANT

## 2025-05-26 PROCEDURE — 1159F MED LIST DOCD IN RCRD: CPT | Mod: CPTII,,, | Performed by: PHYSICIAN ASSISTANT

## 2025-05-26 PROCEDURE — 3078F DIAST BP <80 MM HG: CPT | Mod: CPTII,,, | Performed by: PHYSICIAN ASSISTANT

## 2025-05-26 PROCEDURE — 3074F SYST BP LT 130 MM HG: CPT | Mod: CPTII,,, | Performed by: PHYSICIAN ASSISTANT

## 2025-05-26 PROCEDURE — 3008F BODY MASS INDEX DOCD: CPT | Mod: CPTII,,, | Performed by: PHYSICIAN ASSISTANT

## 2025-05-26 RX ORDER — KETOCONAZOLE 20 MG/ML
SHAMPOO, SUSPENSION TOPICAL
COMMUNITY
Start: 2025-05-15

## 2025-05-26 RX ORDER — HYDROCORTISONE 25 MG/G
CREAM TOPICAL
COMMUNITY
Start: 2025-05-15

## 2025-05-26 NOTE — PROGRESS NOTES
[facility]  History & Physical  Neurosurgery      Adurey Braxton   [id number]   1955       SUBJECTIVE:     CHIEF COMPLAINT:  Neck pain      HPI:  Audrey Braxton is a 70 y.o. female who presents for neurosurgical evaluation.  The patient has been referred to our office in regards to her cervical spine.  Her history is significant for dyslipidemia, anxiety/depression, peripheral neuropathy, leukopenia, and osteopenia.  The patient complains of neck pain with pain radiating into the right upper trapezius muscle.  Intermittently, there is numbness into the 4th and 5th fingertips.  Subjectively, she does not have weakness in either upper extremity.  She began with pain in the neck and lower back in 2011 after pulling plants.  Her symptoms have been intermittent since that time.  In the past 2 years, her neck pain has been more severe.  She rates her pain today at 3/10.  The patient's neck pain increases with flexion-extension as well as prolonged sitting.  The pain tends to be worse at night.  The pain is improved with use of ice and compound cream.  She has undergone a course of physical therapy.  She finds it has helped in teaching her a home exercise program that she continues with.  She is stretching, using heat/ice, and maintaining proper posture.  She has a home exercise program for both the cervical and lumbar region.    She also experiences intermittent lower back pain.  She describes mechanical type back pain.  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  There is numbness from the knee down through the entire lower leg and feet.  She has been diagnosed with peripheral neuropathy.  She is taking gabapentin once daily.  She has difficulties with her balance due to the peripheral neuropathy.  There are fluctuations in her bowel pattern that has gone on for the past four years.  She denies disturbances in bladder function.      Past Medical History:   Diagnosis Date    AAA (abdominal aortic  aneurysm)     Anxiety     Cataract     Degenerative disc disease, cervical     Depression     Hyperlipidemia     Neuropathy        Past Surgical History:   Procedure Laterality Date    CATARACT EXTRACTION      CHOLECYSTECTOMY      COLONOSCOPY N/A 2024    Procedure: COLONOSCOPY;  Surgeon: Margarito Schaffer MD;  Location: [facility];  Service: Gastroenterology;  Laterality: N/A;    TONSILLECTOMY         Family History   Problem Relation Name Age of Onset    Diabetes Mother      Stroke Mother      Hypertension Mother      Stroke Father         Social History     Socioeconomic History    Marital status:    Tobacco Use    Smoking status: Former     Current packs/day: 0.00     Types: Cigarettes, Vaping with nicotine     Start date: 2025     Quit date: 2025     Years since quittin.3     Passive exposure: Current    Smokeless tobacco: Never   Substance and Sexual Activity    Alcohol use: Yes     Alcohol/week: 1.0 standard drink of alcohol     Types: 1 Glasses of wine per week    Drug use: Never     Social Drivers of Health     Financial Resource Strain: High Risk (2025)    Received from [facility] SDOH Screening     In the past year, have you been unable to get any of the following when you really needed them? choose all that apply.: Medicine or health care     In the past year, have you been unable to get any of the following when you really needed them? choose all that apply.: Clothing        Review of patient's allergies indicates:   Allergen Reactions    Amoxicillin-pot clavulanate Diarrhea        Current Outpatient Medications   Medication Instructions    acetaminophen (TYLENOL) 650 mg, Daily    cholecalciferol (vitamin D3) 5,000 Units, Daily    DULoxetine 20 mg CDRS 1 tablet, Daily    gabapentin (NEURONTIN) 300 mg, Oral, Daily    glucosamine-chondroitin 500-400 mg tablet 1 tablet, 3 times daily    hydrocortisone 2.5 % cream Apply topically.    ketoconazole (NIZORAL) 2 % shampoo  "Apply topically.    lamotrigine2.5% meloxicam 0.09% LIDOcaine2% prilocaine2% topical cream Apply topically. Apply 1 or 2 pumps to painful area up to 5 times daily.  Maximum 15 pumps/day.    LIVALO 2 mg, Oral, Daily    loratadine (CLARITIN) 10 mg tablet 1 tablet, Daily    montelukast (SINGULAIR) 10 mg tablet     omega-3 fatty acids/fish oil (FISH OIL-OMEGA-3 FATTY ACIDS) 300-1,000 mg capsule Daily       Review of Systems   Constitutional:  Negative for chills and fever.   HENT:  Negative for nosebleeds and sore throat.    Eyes:  Negative for pain and visual disturbance.   Respiratory:  Negative for cough, chest tightness and shortness of breath.    Cardiovascular:  Negative for chest pain.   Gastrointestinal:  Negative for diarrhea, nausea and vomiting.   Genitourinary:  Negative for difficulty urinating, dysuria and hematuria.   Musculoskeletal:  Positive for back pain, gait problem and neck pain. Negative for myalgias.   Skin:  Negative for rash.   Neurological:  Positive for numbness. Negative for dizziness, facial asymmetry, weakness and headaches.   Psychiatric/Behavioral:  Negative for confusion and sleep disturbance. The patient is not nervous/anxious.        OBJECTIVE:       Visit Vitals  /79 (BP Location: Left arm, Patient Position: Sitting)   Pulse (!) 53   Temp 97.9 °F (36.6 °C) (Oral)   Resp 18   Ht 5' 5" (1.651 m)   Wt 64.4 kg (142 lb)   BMI 23.63 kg/m²        General:  Pleasant, Well-nourished, Well-groomed.    CV:  Neck is supple.  There are no carotid bruits.  Heart has regular rate and rhythm.    Lungs:  Lungs are clear to auscultation bilaterally with non-labored respirations.    Abdomen:  Soft, non-tender, non-distended.    Musculoskeletal:  Cervical ROM:  Mildly limited with bilateral rotation.  Neck pain is increased in all 4 spheres  Tinel's is negative at bilateral wrist and elbows.  Spurling's maneuver causes an increase in neck pain bilaterally.  Lumbar ROM:  Full  Straight leg raise is " negative bilaterally.  Crossed straight leg raise is negative bilaterally.  Hip rotation is negative bilaterally.    Neurological:    Alert and oriented to person, place, time, and situation.  Muscle strength against resistance:  Strength  Deltoids Biceps Triceps Wrist Extension Wrist Flexion Intrinsics   Upper: R 5/5 5/5 5/5 5/5  5/5    L 5/5 5/5 5/5 5/5  5/5     Iliopsoas Quadriceps Knee  Flexion Tibialis  anterior Gastro- cnemius EHL   Lower: R 5/5 5/5 5/5 5/5 5/5 5/5    L 5/5 5/5 5/5 5/5 5/5 5/5   Sensation is intact to primary modalities in bilateral upper and lower extremities with the exception of being diminished in the right median distribution and through bilateral lower legs and feet.  Reflexes:   Right Left   Triceps 2+ 2+   Biceps 2+ 21   Brachioradialis 2+ 2+   Patellar 2+ 2+   Achilles 0 1+   Fountain's sign is negative bilaterally.  There is no clonus at the ankles.  Gait is normal.   She is able to walk on heels and toes without difficulty.      Imaging:  All pertinent neuroimaging independently reviewed. Discussed these findings in detail with the patient.  Three views of the cervical spine were obtained on 05/16/2025.  This study shows anterolisthesis at C4-5 and C7-T1.  There is disc space narrowing and spondylosis at C5-6 and C6-7.  MRI of the cervical spine without contrast was obtained on 07/26/2023.  At C6-7, there is disc/osteophyte complex along with uncinate and facet hypertrophy causing severe central moderate left foraminal stenosis.  There is cord compression at this level.  There was no abnormal signal in the cord at that time.  At C5-6, there is severe right foraminal stenosis secondary to uncinate and facet hypertrophy.  At C4-5, there is severe left foraminal stenosis.  Abdominal ultrasound from 05/21/2025 shows AAA.  CT of the abdomen and pelvis from 11/11/2022 shows L5-S1 spondylolisthesis with severe disc space narrowing and vacuum disc phenomenon.    ASSESSMENT:     1. Spinal  stenosis, cervical region  MRI Cervical Spine Without Contrast    X-Ray Cervical Spine Flexion And Extension Only      2. Neck pain  Ambulatory referral/consult to Neurosurgery      3. Dorsalgia, unspecified  MRI Lumbar Spine Without Contrast    X-Ray Lumbar Complete Including Flex And Ext        PLAN:     Options were discussed at length with the patient.  She is interested in trying injections.  We need to obtain updated imaging.  We will have her return with cervical and lumbar MRIs and x-rays.  She will continue with a home exercise program.  I asked her to discussed going up on the gabapentin with her primary care provider.      A total of 37 minutes was spent face-to-face with the patient during this encounter.  Over half of that time was spent on counseling and coordination of care.  An additional 15+ minutes were used to review the patient's chart including notes from Kailey Russell NP and Dr. Thompson, cervical x-ray images and report, prior cervical MRI images, abdominal US, prior CT abdomen and pelvis, and work on office note..      Michelle Keith PA-C      Disclaimer:  This note is prepared using voice recognition software and as such is likely to have errors despite attempts at proofreading.

## 2025-05-27 ENCOUNTER — HOSPITAL ENCOUNTER (OUTPATIENT)
Dept: RADIOLOGY | Facility: HOSPITAL | Age: 70
Discharge: HOME | End: 2025-05-27
Attending: PHYSICIAN ASSISTANT
Payer: MEDICARE

## 2025-05-27 DIAGNOSIS — M54.9 DORSALGIA, UNSPECIFIED: ICD-10-CM

## 2025-05-27 DIAGNOSIS — M48.02 SPINAL STENOSIS, CERVICAL REGION: ICD-10-CM

## 2025-05-27 PROCEDURE — 72040 X-RAY EXAM NECK SPINE 2-3 VW: CPT | Mod: TC

## 2025-05-27 PROCEDURE — 72141 MRI NECK SPINE W/O DYE: CPT | Mod: TC

## 2025-05-27 PROCEDURE — 72114 X-RAY EXAM L-S SPINE BENDING: CPT | Mod: TC

## 2025-05-27 PROCEDURE — 72148 MRI LUMBAR SPINE W/O DYE: CPT | Mod: TC

## 2025-05-28 ENCOUNTER — TELEPHONE (OUTPATIENT)
Dept: NEUROSURGERY | Facility: CLINIC | Age: 70
End: 2025-05-28
Payer: MEDICARE

## 2025-05-28 DIAGNOSIS — D72.819 LEUKOPENIA, UNSPECIFIED TYPE: Primary | ICD-10-CM

## 2025-05-28 NOTE — TELEPHONE ENCOUNTER
----- Message from Michelle Keith PA-C sent at 5/27/2025  5:02 PM CDT -----  Regarding: follow up appt  She already had her scans done.  There is time that I can see her next week.  Please move her up if she can.

## 2025-06-02 ENCOUNTER — LAB VISIT (OUTPATIENT)
Dept: LAB | Facility: HOSPITAL | Age: 70
End: 2025-06-02
Attending: INTERNAL MEDICINE
Payer: MEDICARE

## 2025-06-02 DIAGNOSIS — M79.2 PERIPHERAL NEUROPATHIC PAIN: Primary | ICD-10-CM

## 2025-06-02 DIAGNOSIS — M85.89 OSTEOPENIA OF MULTIPLE SITES: ICD-10-CM

## 2025-06-02 DIAGNOSIS — D72.819 LEUKOPENIA, UNSPECIFIED TYPE: ICD-10-CM

## 2025-06-02 DIAGNOSIS — F32.A DEPRESSION, UNSPECIFIED DEPRESSION TYPE: ICD-10-CM

## 2025-06-02 DIAGNOSIS — F41.9 ANXIETY: ICD-10-CM

## 2025-06-02 DIAGNOSIS — M54.2 NECK PAIN: ICD-10-CM

## 2025-06-02 DIAGNOSIS — E78.5 HYPERLIPIDEMIA, UNSPECIFIED HYPERLIPIDEMIA TYPE: ICD-10-CM

## 2025-06-02 DIAGNOSIS — Z12.31 OTHER SCREENING MAMMOGRAM: ICD-10-CM

## 2025-06-02 LAB
BASOPHILS # BLD AUTO: 0.02 X10(3)/MCL
BASOPHILS NFR BLD AUTO: 0.6 %
EOSINOPHIL # BLD AUTO: 0.17 X10(3)/MCL (ref 0–0.9)
EOSINOPHIL NFR BLD AUTO: 5 %
ERYTHROCYTE [DISTWIDTH] IN BLOOD BY AUTOMATED COUNT: 12.2 % (ref 11.5–17)
HCT VFR BLD AUTO: 44 % (ref 37–47)
HGB BLD-MCNC: 14.3 G/DL (ref 12–16)
IMM GRANULOCYTES # BLD AUTO: 0 X10(3)/MCL (ref 0–0.04)
IMM GRANULOCYTES NFR BLD AUTO: 0 %
LYMPHOCYTES # BLD AUTO: 1.12 X10(3)/MCL (ref 0.6–4.6)
LYMPHOCYTES NFR BLD AUTO: 32.8 %
MCH RBC QN AUTO: 32.4 PG (ref 27–31)
MCHC RBC AUTO-ENTMCNC: 32.5 G/DL (ref 33–36)
MCV RBC AUTO: 99.5 FL (ref 80–94)
MONOCYTES # BLD AUTO: 0.22 X10(3)/MCL (ref 0.1–1.3)
MONOCYTES NFR BLD AUTO: 6.5 %
NEUTROPHILS # BLD AUTO: 1.88 X10(3)/MCL (ref 2.1–9.2)
NEUTROPHILS NFR BLD AUTO: 55.1 %
NRBC BLD AUTO-RTO: 0 %
PLATELET # BLD AUTO: 192 X10(3)/MCL (ref 130–400)
PMV BLD AUTO: 11 FL (ref 7.4–10.4)
RBC # BLD AUTO: 4.42 X10(6)/MCL (ref 4.2–5.4)
WBC # BLD AUTO: 3.41 X10(3)/MCL (ref 4.5–11.5)

## 2025-06-02 PROCEDURE — 83521 IG LIGHT CHAINS FREE EACH: CPT

## 2025-06-02 PROCEDURE — 36415 COLL VENOUS BLD VENIPUNCTURE: CPT

## 2025-06-02 PROCEDURE — 82525 ASSAY OF COPPER: CPT

## 2025-06-02 PROCEDURE — 85025 COMPLETE CBC W/AUTO DIFF WBC: CPT

## 2025-06-03 ENCOUNTER — OFFICE VISIT (OUTPATIENT)
Facility: CLINIC | Age: 70
End: 2025-06-03
Payer: MEDICARE

## 2025-06-03 DIAGNOSIS — D75.89 MACROCYTOSIS WITHOUT ANEMIA: ICD-10-CM

## 2025-06-03 DIAGNOSIS — D70.9 NEUTROPENIA, UNSPECIFIED TYPE: Primary | ICD-10-CM

## 2025-06-03 DIAGNOSIS — D72.819 LEUKOPENIA, UNSPECIFIED TYPE: Primary | ICD-10-CM

## 2025-06-03 DIAGNOSIS — I71.40 ANEURYSM OF ABDOMINAL AORTA: Primary | ICD-10-CM

## 2025-06-03 LAB
COPPER SERPL-MCNC: 95 MCG/DL (ref 77–206)
KAPPA LC FREE SER NEPH-MCNC: 1.52 MG/DL (ref 0.33–1.94)
KAPPA LC FREE/LAMBDA FREE SER NEPH: 1.22 {RATIO} (ref 0.26–1.65)
LAMBDA LC FREE SERPL-MCNC: 1.25 MG/DL (ref 0.57–2.63)

## 2025-06-03 PROCEDURE — 1126F AMNT PAIN NOTED NONE PRSNT: CPT | Mod: CPTII,95,, | Performed by: INTERNAL MEDICINE

## 2025-06-03 PROCEDURE — 98006 SYNCH AUDIO-VIDEO EST MOD 30: CPT | Mod: 95,,, | Performed by: INTERNAL MEDICINE

## 2025-06-03 PROCEDURE — 3288F FALL RISK ASSESSMENT DOCD: CPT | Mod: CPTII,95,, | Performed by: INTERNAL MEDICINE

## 2025-06-03 PROCEDURE — 1101F PT FALLS ASSESS-DOCD LE1/YR: CPT | Mod: CPTII,95,, | Performed by: INTERNAL MEDICINE

## 2025-06-03 PROCEDURE — G2211 COMPLEX E/M VISIT ADD ON: HCPCS | Mod: 95,,, | Performed by: INTERNAL MEDICINE

## 2025-06-03 PROCEDURE — 1160F RVW MEDS BY RX/DR IN RCRD: CPT | Mod: CPTII,95,, | Performed by: INTERNAL MEDICINE

## 2025-06-03 PROCEDURE — 1159F MED LIST DOCD IN RCRD: CPT | Mod: CPTII,95,, | Performed by: INTERNAL MEDICINE

## 2025-06-03 RX ORDER — LORAZEPAM 2 MG/1
TABLET ORAL
COMMUNITY
Start: 2025-06-01

## 2025-06-04 ENCOUNTER — OFFICE VISIT (OUTPATIENT)
Dept: NEUROSURGERY | Facility: CLINIC | Age: 70
End: 2025-06-04
Payer: MEDICARE

## 2025-06-04 VITALS
SYSTOLIC BLOOD PRESSURE: 118 MMHG | HEIGHT: 65 IN | BODY MASS INDEX: 23.66 KG/M2 | RESPIRATION RATE: 16 BRPM | DIASTOLIC BLOOD PRESSURE: 75 MMHG | WEIGHT: 142 LBS | HEART RATE: 60 BPM

## 2025-06-04 DIAGNOSIS — M43.17 SPONDYLOLISTHESIS AT L5-S1 LEVEL: ICD-10-CM

## 2025-06-04 DIAGNOSIS — M41.9 SCOLIOSIS, UNSPECIFIED SCOLIOSIS TYPE, UNSPECIFIED SPINAL REGION: ICD-10-CM

## 2025-06-04 DIAGNOSIS — Z78.0 POSTMENOPAUSE: ICD-10-CM

## 2025-06-04 DIAGNOSIS — M85.80 OSTEOPENIA, UNSPECIFIED LOCATION: ICD-10-CM

## 2025-06-04 DIAGNOSIS — M47.22 CERVICAL SPONDYLOSIS WITH RADICULOPATHY: Primary | ICD-10-CM

## 2025-06-04 PROCEDURE — 3288F FALL RISK ASSESSMENT DOCD: CPT | Mod: CPTII,,, | Performed by: PHYSICIAN ASSISTANT

## 2025-06-04 PROCEDURE — 3008F BODY MASS INDEX DOCD: CPT | Mod: CPTII,,, | Performed by: PHYSICIAN ASSISTANT

## 2025-06-04 PROCEDURE — 3074F SYST BP LT 130 MM HG: CPT | Mod: CPTII,,, | Performed by: PHYSICIAN ASSISTANT

## 2025-06-04 PROCEDURE — 99214 OFFICE O/P EST MOD 30 MIN: CPT | Mod: ,,, | Performed by: PHYSICIAN ASSISTANT

## 2025-06-04 PROCEDURE — 1101F PT FALLS ASSESS-DOCD LE1/YR: CPT | Mod: CPTII,,, | Performed by: PHYSICIAN ASSISTANT

## 2025-06-04 PROCEDURE — 1160F RVW MEDS BY RX/DR IN RCRD: CPT | Mod: CPTII,,, | Performed by: PHYSICIAN ASSISTANT

## 2025-06-04 PROCEDURE — 1125F AMNT PAIN NOTED PAIN PRSNT: CPT | Mod: CPTII,,, | Performed by: PHYSICIAN ASSISTANT

## 2025-06-04 PROCEDURE — 1159F MED LIST DOCD IN RCRD: CPT | Mod: CPTII,,, | Performed by: PHYSICIAN ASSISTANT

## 2025-06-04 PROCEDURE — 3078F DIAST BP <80 MM HG: CPT | Mod: CPTII,,, | Performed by: PHYSICIAN ASSISTANT

## 2025-06-05 ENCOUNTER — HOSPITAL ENCOUNTER (OUTPATIENT)
Dept: RADIOLOGY | Facility: HOSPITAL | Age: 70
Discharge: HOME | End: 2025-06-05
Attending: PHYSICIAN ASSISTANT
Payer: MEDICARE

## 2025-06-05 DIAGNOSIS — Z78.0 POSTMENOPAUSE: ICD-10-CM

## 2025-06-05 PROCEDURE — 77080 DXA BONE DENSITY AXIAL: CPT | Mod: TC

## 2025-06-18 ENCOUNTER — OFFICE VISIT (OUTPATIENT)
Dept: CARDIAC SURGERY | Facility: CLINIC | Age: 70
End: 2025-06-18
Payer: MEDICARE

## 2025-06-18 VITALS
BODY MASS INDEX: 23.82 KG/M2 | DIASTOLIC BLOOD PRESSURE: 83 MMHG | SYSTOLIC BLOOD PRESSURE: 117 MMHG | HEIGHT: 65 IN | WEIGHT: 143 LBS | HEART RATE: 67 BPM

## 2025-06-18 DIAGNOSIS — I71.40 ABDOMINAL AORTIC ANEURYSM (AAA) WITHOUT RUPTURE, UNSPECIFIED PART: Primary | ICD-10-CM

## 2025-06-18 DIAGNOSIS — I71.40 ANEURYSM OF ABDOMINAL AORTA: ICD-10-CM

## 2025-06-18 PROCEDURE — 3079F DIAST BP 80-89 MM HG: CPT | Mod: CPTII,,, | Performed by: THORACIC SURGERY (CARDIOTHORACIC VASCULAR SURGERY)

## 2025-06-18 PROCEDURE — 3074F SYST BP LT 130 MM HG: CPT | Mod: CPTII,,, | Performed by: THORACIC SURGERY (CARDIOTHORACIC VASCULAR SURGERY)

## 2025-06-18 PROCEDURE — 99215 OFFICE O/P EST HI 40 MIN: CPT | Mod: ,,, | Performed by: THORACIC SURGERY (CARDIOTHORACIC VASCULAR SURGERY)

## 2025-06-18 PROCEDURE — 1101F PT FALLS ASSESS-DOCD LE1/YR: CPT | Mod: CPTII,,, | Performed by: THORACIC SURGERY (CARDIOTHORACIC VASCULAR SURGERY)

## 2025-06-18 PROCEDURE — 1160F RVW MEDS BY RX/DR IN RCRD: CPT | Mod: CPTII,,, | Performed by: THORACIC SURGERY (CARDIOTHORACIC VASCULAR SURGERY)

## 2025-06-18 PROCEDURE — 1159F MED LIST DOCD IN RCRD: CPT | Mod: CPTII,,, | Performed by: THORACIC SURGERY (CARDIOTHORACIC VASCULAR SURGERY)

## 2025-06-18 PROCEDURE — 3288F FALL RISK ASSESSMENT DOCD: CPT | Mod: CPTII,,, | Performed by: THORACIC SURGERY (CARDIOTHORACIC VASCULAR SURGERY)

## 2025-06-18 PROCEDURE — 3008F BODY MASS INDEX DOCD: CPT | Mod: CPTII,,, | Performed by: THORACIC SURGERY (CARDIOTHORACIC VASCULAR SURGERY)

## 2025-06-18 NOTE — PROGRESS NOTES
[facility]      Cardiothoracic Surgery  Consult Note     Patient Name: Audrey Braxton  MRN: [id number]  Visit Date: 2025  Primary Care Physician: Kailey Russell NP    Subjective:     Chief Complaint/Reason for Follow-Up Visit: Newly Diagnosed AAA    HPI: Ms. Braxton is a 71 y/o female who is a PT of Dr. Thompson. She was recently worked up and found to have a Newly Diagnosed AAA on Abd US, which is an incidental findings. See Diagnostic Below. She presents today for a Consultation and Plan of Care for the Newly Diagnosed AAA.    Previous Cardiac Diagnostics: Reviewed  Abd US 25:  1. Status post cholecystectomy with dilated common bile duct measuring 9.5 mm which has slightly decreased in size compared to the prior exam  2. Suspect interim aneurysmal dilatation of the lower abdominal aorta measuring up the 4.1 cm in AP and transverse diameter.  Aortic atherosclerosis is seen.    Past Medical History:   Diagnosis Date    AAA (abdominal aortic aneurysm)     Anxiety     Cataract     Degenerative disc disease, cervical     Depression     Hyperlipidemia     Neuropathy      Past Surgical History:   Procedure Laterality Date    CATARACT EXTRACTION      CHOLECYSTECTOMY      COLONOSCOPY N/A 2024    Procedure: COLONOSCOPY;  Surgeon: Margarito Schaffer MD;  Location: [facility];  Service: Gastroenterology;  Laterality: N/A;    TONSILLECTOMY       Family History       Problem Relation (Age of Onset)    Diabetes Mother    Hypertension Mother    Stroke Mother, Father          Tobacco Use    Smoking status: Former     Current packs/day: 0.00     Types: Cigarettes, Vaping with nicotine     Start date: 2025     Quit date: 2025     Years since quittin.4     Passive exposure: Current    Smokeless tobacco: Never   Substance and Sexual Activity    Alcohol use: Yes     Alcohol/week: 1.0 standard drink of alcohol     Types: 1 Glasses of wine per week    Drug use: Never    Sexual activity: Not on file      Review of patient's allergies indicates:   Allergen Reactions    Amoxicillin-pot clavulanate Diarrhea     Review of Systems   Constitutional: Negative for fever and malaise/fatigue.   Cardiovascular:  Negative for chest pain, claudication and irregular heartbeat.   Respiratory:  Negative for shortness of breath.    Gastrointestinal:  Negative for nausea and vomiting.   Neurological:  Negative for weakness.   All other systems reviewed and are negative.    Objective:     Vital Signs (Most Recent):  Pulse: 67 (06/18/25 1324)  BP: 117/83 (06/18/25 1324) Vital Signs (24h Range):  [unfilled]   Weight: 64.9 kg (143 lb)  Body mass index is 23.8 kg/m².    Physical Exam  Constitutional:       Appearance: Normal appearance.   HENT:      Head: Normocephalic.   Eyes:      Extraocular Movements: Extraocular movements intact.   Cardiovascular:      Rate and Rhythm: Normal rate and regular rhythm.      Heart sounds: Normal heart sounds.   Pulmonary:      Effort: Pulmonary effort is normal.      Breath sounds: Normal breath sounds.   Abdominal:      Palpations: Abdomen is soft.   Musculoskeletal:         General: Normal range of motion.   Skin:     General: Skin is warm and dry.      Capillary Refill: Capillary refill takes less than 2 seconds.   Neurological:      General: No focal deficit present.      Mental Status: She is alert and oriented to person, place, and time.   Psychiatric:         Mood and Affect: Mood normal.       Significant Labs: Reviewed  No results found for this or any previous visit (from the past 72 hours).  Significant Imaging: Reviewed     Assessment:   Newly Diagnosed AAA    - Abd US (5.21.25) - Suspect interim aneurysmal dilatation of the lower abdominal aorta measuring up the 4.1 cm in AP and transverse diameter.  Aortic atherosclerosis is seen.    Plan:   Get CT of Abd to Evaluate AAA  F/U in Clinic for Results  Send Note to Dr. Haider    [facility]   Ricki Carmen,  ANP  Cardiothoracic Surgery   [facility]     The patient is seen and examined.  4.1 cm abdominal aortic aneurysm.  We will get a confirmation CT.  I plan to get a CT chest abdomen and pelvis to rule out any chest pathology too.  If the CT correlates with the ultrasound the patient will need follow up in 6 months with another abdominal aortic ultrasound.  I did reassure her that she does not need any intervention at this time

## 2025-06-20 ENCOUNTER — TELEPHONE (OUTPATIENT)
Dept: NEUROSURGERY | Facility: CLINIC | Age: 70
End: 2025-06-20
Payer: MEDICARE

## 2025-06-20 ENCOUNTER — RESULTS FOLLOW-UP (OUTPATIENT)
Dept: NEUROSURGERY | Facility: CLINIC | Age: 70
End: 2025-06-20

## 2025-06-20 NOTE — TELEPHONE ENCOUNTER
Spoke with the patient regarding osteopenia noted on recent DEXA scan.  Patient was advised to start Calcium 600mg twice per day and Vitamin D 1000IU daily with meals (OTC Calcium plus Vit D supplement as directed like Os-emilia D or Caltrate).  She verbalizes understanding at this time.  She was advised to notify us with any concerns or questions.

## 2025-06-27 ENCOUNTER — HOSPITAL ENCOUNTER (OUTPATIENT)
Dept: RADIOLOGY | Facility: HOSPITAL | Age: 70
Discharge: HOME | End: 2025-06-27
Attending: THORACIC SURGERY (CARDIOTHORACIC VASCULAR SURGERY)
Payer: MEDICARE

## 2025-06-27 DIAGNOSIS — I71.40 ABDOMINAL AORTIC ANEURYSM (AAA) WITHOUT RUPTURE, UNSPECIFIED PART: ICD-10-CM

## 2025-06-27 PROCEDURE — 71250 CT THORAX DX C-: CPT | Mod: TC

## 2025-07-02 DIAGNOSIS — I71.40 ABDOMINAL AORTIC ANEURYSM (AAA) WITHOUT RUPTURE, UNSPECIFIED PART: Primary | ICD-10-CM

## 2025-07-27 NOTE — PROGRESS NOTES
Meet Dwyer M.D.   [facility]  Interventional Pain    New Patient Visit    Referring provider: Michelle Keith    Chief Complaint   Patient presents with    Neck Pain     Patient has most of the pain at the Location: Cervical Spine/Neck , Radiation: Notes radiation of pain along the Right Upper Extremity for duration of  14 year(s) and with pain score of 10/10.         Assessment and Plan:     Audrey Braxton is a 70 y.o. female with neck pain. A detailed and lengthy discussion was undertaken regarding the patient's presentation including history,  physical examination, past treatments, relevant laboratory, imaging results and future management strategies.      Assessment: 70 y.o. year old female with        ICD-10-CM ICD-9-CM   1. Cervical radiculopathy, chronic  M54.12 723.4   2. Facet arthritis of cervical region  M47.812 721.0   3. Cervical spondylosis with radiculopathy  M47.22 721.0       The mentioned diagnosis is Worsening and is accompanied by significant limitation to ADL's    1. Cervical radiculopathy, chronic  Assessment & Plan:  The patient presents with neck and radiating upper extremity pain consistent with cervical radiculopathy, localized to the C5, C6, and C7 nerve root distribution. Symptoms include pain, paresthesias, and/or weakness corresponding to the affected dermatome, and have persisted for 10 year(s). The diagnosis is supported by clinical findings and correlating imaging, with MRI evidence of foraminal stenosis and/or disc herniation at the C5, C6, and C7 cervical level(s), causing compression or impingement of the corresponding nerve root. The patient has failed conservative management, which has included a combination of physical therapy, oral medications (e.g., NSAIDs, neuropathic agents, muscle relaxants), and activity modification. Despite these measures, the patient continues to experience functionally limiting symptoms  affecting daily activities. A cervical interlaminar epidural steroid injection is recommended as a medically necessary intervention to reduce inflammation around the affected nerve root(s), relieve pain, and restore function. The procedure will be performed under fluoroscopic guidance to ensure accurate epidural placement and optimize clinical outcomes.      Orders:  -     Ambulatory referral/consult to Pain Clinic  -     gabapentin (NEURONTIN) 300 MG capsule; Take 2 capsules (600 mg total) by mouth every evening.  Dispense: 60 capsule; Refill: 0  -     Case Request Operating Room: Injection-steroid-epidural-cervical    2. Facet arthritis of cervical region    3. Cervical spondylosis with radiculopathy         Plan:  The plan was clearly communicated to the patient, who verbalized understanding of the same.     Diagnostics: Reviewed the followed diagnostic results  Labs/EMG: Not pertinent  Imaging:   MRI/CT:   05/27/2025 MRI Cervical Spine Impression: This MRI demonstrates multilevel degenerative disc disease (DDD) with disc space narrowing, osteophyte formation, and facet arthropathy throughout the cervical spine. There is anterolisthesis at C4-C5 and C7-T1, and retrolisthesis at C5-C6 and C6-C7, along with reversal of the normal cervical lordosis centered at C6-C7. Cerebellar tonsils extend to the foramen magnum, suggestive of low-lying tonsils or a mild Chiari I malformation, without cord signal change. Bone marrow signal is heterogeneous, likely reflecting chronic degenerative changes. Central canal stenosis is: Mild at C3-C4, Moderate at C4-C5, Moderate with mild cord mass effect at C5-C6, and Severe at C6-C7 with cord compression. Neural foraminal stenosis is: Moderate at C4-C5, Moderate bilaterally at C5-C6, Severe on the left and moderate on the right at C6-C7, and Mild on the left at C7-T1. There is no intramedullary lesion, no paraspinal mass, and the spinal cord remains normal in size. These findings  reflect advanced spondylosis with multilevel central and foraminal stenosis, most pronounced at C5-C6 and C6-C7, with signs of cord compression.  05/27/2025 MRI Lumbar Spine Impression: The lumbar spine shows multilevel degenerative disc disease (DDD) with disc space narrowing, posterior disc bulging, osteophyte formation, facet arthropathy, and degenerative endplate changes. There is bilateral spondylolysis at L1, grade 1 anterolisthesis of L5 on S1, and slight retrolisthesis of L4 on L5, contributing to mechanical instability. The conus medullaris is normal in size and position, with no evidence of intramedullary or paraspinal mass. Diffuse bone marrow signal heterogeneity is likely related to chronic degeneration. Central canal stenosis is: Mild at L2-L3, Mild to moderate at L3-L4, and Moderate at L5-S1, associated with the anterolisthesis. Neural foraminal stenosis is: Mild at L2-L3, Mild to moderate bilaterally at L3-L4, Moderate on the left and mild on the right at L4-L5, and Severe bilaterally at L5-S1. Ligamentum flavum hypertrophy contributes to the stenosis at L2 through L5-S1 levels. These findings reflect advanced spondylosis and segmental instability, particularly at L5-S1, with both central and foraminal narrowing contributing to potential nerve root impingement.  Xray:  05/27/2025 Xray Cervical Spine Impression: The vertebral body heights are maintained without evidence of an acute fracture.  The prevertebral soft tissues are unremarkable there is grade 1 anterolisthesis of C4 on C5 and C7 on T1.  Moderate to severe spondylosis of the cervical spine with multi level marginal endplate osteophytes, facet arthropathy, and uncovertebral spurring.  There is moderate to severe disc height loss of C5-6 and C6-7 with moderate disc height loss of C7-T1 and C4-C5.   05/27/2025 Xray Lumbar Spine Impression: There is grade 2 anterolisthesis of L5 on S1 with bilateral spondylolysis at L5.  Disc space narrowing,  osteophyte formation, and facet arthrosis are identified.  Bony structures are osteopenic.  No significant abnormal vertebral body movement is seen on additional flexion and extension views. There is curvature of the lumbar spine with the convexity to left.  Five non-rib-bearing lumbar vertebral bodies are identified.    Non-Pharmacologic/Non Interventional Measures  In last one year, patient attempted the following non-medication physician directed conservative measures Physician directed Physical therapy, TENs unit, Dry Needling, Massage therapy, Home exercises, Heat pad, and Cold/Ice Pack for current presentation with mild benefit   Physician directed PT/OT/Chiropractic for current complaint: Patient has previously completed physical therapy for current presentation in year 2023 to a total of 12 sessions at [facility] [place]. Patient found mild improvement for pain and mild improvement in functionality for current presentation.   We also discussed extensively to consider other measures including Physician directed Physical therapy, TENs unit, Dry Needling, Massage therapy, Home exercises, Heat pad, Cold/Ice Pack, Yoga, Meditation, Acupuncture/Acupressure, and Nutrition change: Anti-Inflammatory diet to improve pain and functionality   New PT Referral: I did not provide a new PT referral at this visit    Medications:  Plan/Ordered: Increase the dose to 600 mg for gabapentin  Current: Acetaminophen, duloxetine, compounded cream of lamotrigine meloxicam prilocaine, lorazepam, Gabapentin  Previous: acetaminophen codeine combination, cyclobenzaprine, diclofenac tablet, ibuprofen, naproxen, tramadol  Antiplatelets/Anticoagulants: not on any antiplatelet agents and not on any anticoagulants  PCP: Kailey Russell NP  Cardiologist: Yes and pertinent for Brandon Weldon    Interventions:  Injections:  Plan/Ordered:   Procedure: I will schedule the patient for procedure Cervical Interlaminar JUANITA on 08/19/2025.  Medications  for Procedure: Patient doesn't need to hold any medications for this procedure.  Cardiac Clearance for Procedure: Patient doesn't need any clearance prior to the procedure.  Information on the procedure was provided to the patient today. Risks and benefits were discussed. All questions were answered.    Previous Interventions/surgical: procedures:    Injections with other providers: Yes and pertinent for epidural injections with Dr. Jon after she finished PT in 2023 with 3 months benefit   Surgery of the area of pain: Not pertinent  Other: Not pertinent    New Consults:  Not pertinent    Relevant Risk Factors for Interventions:  PMH:  has a past medical history of AAA (abdominal aortic aneurysm), Anxiety, Degenerative disc disease, cervical, Depression, Hyperlipidemia, osteopenia and Neuropathy.  PSH:  has a past surgical history that is not pertinent.  Allergies: Not pertinent  Other:   I reviewed the prior notes from each unique source dated 06/18/2025 with Dr. Retana (Cardiac surgery),  06/04/2025 with Michelle PERAZA (NeuroSurgery)  Old Records: Decision was made to not obtain old records   Follow Up: Plan for patient to follow up  2-3 week(s)    Current Visit Imaging Interpretation:  I independently visualized/interpreted the following images dated 05/27/2025 MRI Cervical Spine    Pertinent findings include: This MRI demonstrates multilevel degenerative disc disease (DDD) with disc space narrowing, osteophyte formation, and facet arthropathy throughout the cervical spine. There is anterolisthesis at C4-C5 and C7-T1, and retrolisthesis at C5-C6 and C6-C7, along with reversal of the normal cervical lordosis centered at C6-C7. Cerebellar tonsils extend to the foramen magnum, suggestive of low-lying tonsils or a mild Chiari I malformation, without cord signal change. Bone marrow signal is heterogeneous, likely reflecting chronic degenerative changes. Central canal stenosis is: Mild at C3-C4, Moderate at C4-C5,  Moderate with mild cord mass effect at C5-C6, and Severe at C6-C7 with cord compression. Neural foraminal stenosis is: Moderate at C4-C5, Moderate bilaterally at C5-C6, Severe on the left and moderate on the right at C6-C7, and Mild on the left at C7-T1. There is no intramedullary lesion, no paraspinal mass, and the spinal cord remains normal in size. These findings reflect advanced spondylosis with multilevel central and foraminal stenosis, most pronounced at C5-C6 and C6-C7, with signs of cord compression.  Key Images:       The above plan and management options were discussed at length with patient. Patient is in agreement with the above and verbalized understanding.    I discussed the goals of interventional chronic pain management with the patient on today's visit. I explained the utility of injections for diagnostic and therapeutic purposes.  We discussed a multimodal approach to pain including treating the patient's given worst pain at any given time.  We will use a systematic approach to addressing pain.  We will also adopt a multimodal approach that includes injections, adjuvant medications, physical therapy, at times psychiatry.  There may be a limited role for opioid use intermittently in the treatment of pain, more particularly for acute pain although no one approach can be used as a sole treatment modality. I emphasized the importance of regular exercise, core strengthening and stretching, diet and weight loss as a cornerstone of long-term pain management.  This condition does not require this patient to take time off of work, and the primary goal of our Pain Management services is to improve the patient's functional capacity.  Patient Questions: Answered all of the patient's questions regarding diagnoses, therapy, treatment and next steps      History of Present Illness:     HPI Summary    Pain Assessment  Pain Assessment: 0-10 (07/31/25 1529)  Pain Score: 10-Worst pain ever (07/31/25 1529)  Pain  Location: Neck (07/31/25 1529)  Pain Orientation: Right (07/31/25 1529)  Pain Radiating Towards: shoulder (07/31/25 1529)  Pain Descriptors: Aching (07/31/25 1529)  Pain Frequency: Constant/continuous (07/31/25 1529)  Pain Onset: Gradual (07/31/25 1529)  Clinical Progression: Gradually worsening (07/31/25 1529)  Result of Injury: No (07/31/25 1529)  Work-Related Injury: No (07/31/25 1529)      Pain Assessment  Onset: Sudden  Approximate duration of pain: 14 year(s)  Context/Inciting Event:Fall on back in the last 10-15 years, none in the last 5 years  Progression: Not changed    Location: Cervical Spine/Neck  and Right Trap/Shoulder  Radiation: Denies radiation of pain    Description/Character: Aching and Dull  Frequency and Timing: Constant with periods of exacerbation and is worse in Afternoon, Evening, and Night time    Pain Score: Today the pain score is 10/10. Pain scores range from 5/10 - 10/10  Symptoms Worse With: Walking - 30 mins (without pain), Sitting - 1-5 mins (without pain), Standing - 20-30 mins (without pain), Bending forward, Bending backward, Twisting on sides, and Bending on sides  Symptoms Improved With: Heat/Cold Packs and Injections      Associated Conditions:  Numbness: no along Left Upper Extremity and Right Upper Extremity  Weakness: no along Left Upper Extremity and Right Upper Extremity  Balance Problems: Yes  Recent Falls: no  Long term use of opioids >3 months: no  Inflammatory conditions like Rheumatoid/Ankylosing spondylitis/Psoriatic: no  H/o Depression and anxiety requiring medications: yes    Patient denies night fever/night sweats, urinary incontinence, bowel incontinence, significant weight loss, significant motor weakness, and loss of sensations    Functional Impact of Pain:    Pain Disability Index:      7/31/2025     3:29 PM   Last 3 PDI Scores   Pain Disability Index (PDI) 45       Previous Treatments:    Non-Medication Measures in last 1 year  In last one year, patient  attempted the following non-medication physician directed conservative measures Physician directed Physical therapy, TENs unit, Dry Needling, Massage therapy, Home exercises, Heat pad, and Cold/Ice Pack for current presentation with mild benefit   Physician directed PT/OT/Chiropractic for current complaint: Patient has previously completed physical therapy for current presentation in year 2023 to a total of 12 sessions at location [facility] [place]. Patient found mild improvement for pain and mild improvement in functionality for current presentation.     Medications  Current: Acetaminophen, duloxetine, compounded cream of lamotrigine meloxicam prilocaine, lorazepam, Gabapentin  Previous: acetaminophen codeine combination, cyclobenzaprine, diclofenac tablet, ibuprofen, naproxen, tramadol  Antiplatelets/Anticoagulants: not on any antiplatelet agents and not on any anticoagulants  PCP: Kailey Russell NP  Cardiologist: Yes and pertinent for Brandon Weldon    Interventional Procedures  Injections with other providers: Yes and pertinent for epidural injections with Dr. Jon after she finished PT in 2023 with 3 months benefit   Surgery of the area of pain: Not pertinent  Other: Not pertinent    What do you think helps most with pain in the last 6 months to 1 year: Injections/Procedures       Review:  Reviewed and consistent with medication use as prescribed.      Review of patient's allergies indicates:   Allergen Reactions    Amoxicillin-pot clavulanate Diarrhea       Past Medical History:   Diagnosis Date    AAA (abdominal aortic aneurysm)     Anxiety     Cataract     Degenerative disc disease, cervical     Depression     Hyperlipidemia     Neuropathy        Past Surgical History:   Procedure Laterality Date    CATARACT EXTRACTION      CHOLECYSTECTOMY      COLONOSCOPY N/A 06/20/2024    Procedure: COLONOSCOPY;  Surgeon: Margarito Schaffer MD;  Location: [facility];  Service: Gastroenterology;  Laterality: N/A;    TONSILLECTOMY           reports that she quit smoking about 6 months ago. Her smoking use included cigarettes and vaping with nicotine. She started smoking about 6 months ago. She has been exposed to tobacco smoke. She has never used smokeless tobacco. She reports current alcohol use of about 1.0 standard drink of alcohol per week. She reports that she does not use drugs.    Family History   Problem Relation Name Age of Onset    Diabetes Mother      Stroke Mother      Hypertension Mother      Stroke Father         Current Outpatient Medications   Medication Sig    acetaminophen (TYLENOL) 650 MG TbSR Take 650 mg by mouth Daily.    cholecalciferol, vitamin D3, 125 mcg (5,000 unit) Tab Take 5,000 Units by mouth once daily.    DULoxetine 20 mg CDRS Take 1 tablet by mouth Daily.    hydrocortisone 2.5 % cream Apply topically.    ketoconazole (NIZORAL) 2 % shampoo Apply topically.    lamotrigine2.5% meloxicam 0.09% LIDOcaine2% prilocaine2% topical cream Apply topically. Apply 1 or 2 pumps to painful area up to 5 times daily.  Maximum 15 pumps/day.    LIVALO 2 mg Tab tablet Take 1 tablet (2 mg total) by mouth once daily.    LORazepam (ATIVAN) 2 MG Tab Take by mouth.    montelukast (SINGULAIR) 10 mg tablet     omega-3 fatty acids/fish oil (FISH OIL-OMEGA-3 FATTY ACIDS) 300-1,000 mg capsule Take by mouth once daily.    gabapentin (NEURONTIN) 300 MG capsule Take 2 capsules (600 mg total) by mouth every evening.     No current facility-administered medications for this visit.       Review of Systems:     Review of Systems   Constitutional: Negative.  Negative for chills, diaphoresis and fever.   HENT: Negative.     Eyes: Negative.    Respiratory: Negative.  Negative for shortness of breath and wheezing.    Cardiovascular: Negative.  Negative for chest pain and palpitations.   Gastrointestinal: Negative.  Negative for blood in stool.   Genitourinary: Negative.    Musculoskeletal:         Refer to HPI   Skin: Negative.  Negative for rash.  "  Neurological:  Negative for tremors and speech change.   Endo/Heme/Allergies: Negative.    Psychiatric/Behavioral: Negative.         PHYSICAL EXAM:   Visit Vitals  /78 (BP Location: Left arm, Patient Position: Sitting)   Pulse (!) 58   Ht 5' 5" (1.651 m)   Wt 64.9 kg (143 lb)   SpO2 97%   BMI 23.80 kg/m²       General Appearance: healthy, well developed, well nourished, appropriately dressed  Mental Status: Alert, oriented, thought content appropriate; Normal affect  Psych: Mood and affect appropriate  Head: Normocephalic, atraumatic  Eyes: Extraocular movements intact.   Neck: No asymmetry, masses or scars; supple; midline   Skin: Warm and dry; No rashes visible on exposed areas  Lung: Respiratory effort normal, symmetrical chest rise  Cardiovascular: RRR with palpation of the radial artery.    Motor & Sensory:     Strength and Sensation:     Upper Extremity  Nerve C5 C6 C7 C8 T1   Muscle Groups Shoulder Abduction Elbow Flexion (Palm up) Elbow Flexion (Thumb up) Wrist Extension Elbow Extension Wrist Flexion Hand  Finger Abduction   Right 5/5 5/5 5/5 5/5 5/5 5/5 5/5 5/5   Left 5/5 5/5 5/5 5/5 5/5 5/5 5/5 5/5     Nerve Root (area) Light Touch    R L   C5  (Lateral arm below deltoid) 2 2   C6  (Thumb & Radial hand/forearm) 2 2   C7 (Finger 2,3,4) 2 2   C8 (Finger 5) 2 2   T1 (Medial elbow) 2 2     Reflexes:    R L   Biceps (C5) 2+ 2+   Brachioradialis (C6) 2+ 2+   Triceps (C7) 2+ 2+     Upper Tract Signs:    R L   Fountain's Neg for flexion of thumb & Index finger Neg for flexion of thumb & Index finger   Plantar Response Down-going Down-going   Clonus Neg at ankle Neg at ankle     Gait:   Tandem walking: Intact      MSK:  Cervical Exam    Inspection:   Skin: Negative for scars, signs of infection/inflammation, masses at Scalp, neck, shoulder area  Curvature/Symmetry: Normal Alignment, Negative for Cervical Kyphosis, No deviation of neck on either side (torticollis in disc herniation), Both shoulders " symmetrical  Muscles: No wasting of muscles notes at cervical spine or shoulders    Palpation:  No TTP along occipital protuberance, spinous processes of cervical spine, muscles (paraspinal muscles along cervical spine,trapezius,deltoid) and shoulder joints, Paraspinal Muscles along Cervical Spine: TTP Bilaterally, Trapezius: TTP Bilaterally, Deltoid: TTP Bilaterally, and Shoulder Joints: TTP Bilaterally    ROM:   Mildly Limited and Moderately Limited with mild pain and moderate pain for Flexion, Extension, Rotation, and Lateral Bending at cervical spine (R>L)  Full range of motion with no pain for Flexion, Extension, Abduction, Adduction, External Rotation, and Internal Rotation at right and left shoulder joint    Special Tests    Nerve Compression Tests:  Spurling Test (Foraminal compression test): Negative bilaterally  Bakody Sign/Shoulder raise: Negative bilaterally    Facet Joint Test with Nerve compression  Cervical Distraction Test: Positive  De La Torre Test (Spurling without compression): Positive bilaterally (R>L)    Occipital Neuralgia  Tinel's Sign on Occiput:  Negative bilaterally    Other test  Shoulder Tests  Shoulder Impingement Tests:   Empty Can Test: Negative bilaterally  Neer Test (Pronate hand and flex above head): Negative bilaterally    Data     Imaging of Relevance:     Cervical Spine  MRI Imaging  Results for orders placed during the hospital encounter of 05/27/25    MRI Cervical Spine Without Contrast    Narrative  EXAMINATION:  MRI CERVICAL SPINE WITHOUT CONTRAST:    CLINICAL HISTORY:  Spinal stenosis, cervical region, Spinal stenosis, cervical;    COMPARISON:  None available    FINDINGS:  Serial axial and sagittal 1.5 Nevaeh images obtained using T1 and T2 weighted techniques without the administration of intravenous contrast. Vertebral body height is grossly intact.  There is slight anterolisthesis of C4 on C5 and C7 on T1.  Slight retrolisthesis of C5 on C6 and C6 on C7.  There is reversal of  the normal lordotic curve with the apex at C6-7.  Cerebellar tonsils extend caudally to the level of foramen magnum.  The cervical cord is normal in size and caliber.  No abnormal intramedullary or intradural mass identified without the administration of IV contrast.  Disc space narrowing, degenerative disc changes, osteophyte formation, facet arthrosis, and degenerative endplate changes are identified.  No abnormal paraspinal mass is identified without the administration of IV contrast.  Bone marrow signal intensity is heterogeneous throughout.    C2-3: No significant central spinal or neural foraminal stenosis.  Mild facet arthrosis    C3-4: Mild encroachment into the central spinal canal secondary mild posterior central disc bulge and facet arthrosis    C4-5: Moderate encroachment into the neural foramina and mild encroachment the central spinal canal secondary to posterior disc bulge, slight anterolisthesis of C4 on C5, and facet arthrosis    C5-6: Moderate encroachment into the central spinal canal and neural foramina bilaterally secondary to posterior disc bulge, posterior osteophyte formation, and facet arthrosis.  There is mild mass effect on the cervical cord.    C6-7: Severe encroachment in the central spinal canal and left neural foramina and mild to moderate encroachment the right neural foramina secondary to posterior disc bulge, posterior osteophyte formation, and facet arthrosis.  There is mass effect on the cervical cord.    C7-T1: Mild lumbar encroachment into the left neural foramina secondary to posterior osteophyte formation and facet arthrosis    Impression  1. Multilevel moderate to severe encroachment into the central spinal canal and neural foramina as described  2. Slight the anterolisthesis C4-C5 C7 on T1 and slight retrolisthesis of C5 on C6 and C6 on C7 with reversal of the normal lordotic curve with the apex at C6-7  3. Moderate to advanced cervical spondylosis  4. Findings and other  details as above      Electronically signed by: Alejandro Prabhakar  Date:    05/27/2025  Time:    16:28      CT Imaging  No results found for this or any previous visit.    Xray Imaging  Results for orders placed during the hospital encounter of 05/16/25    X-Ray Cervical Spine AP And Lateral    Narrative  EXAMINATION:  XR CERVICAL SPINE AP LATERAL    CLINICAL HISTORY:  Cervicalgia    TECHNIQUE:  AP, lateral, and open mouth views of the cervical spine were performed.    COMPARISON:  06/07/2023    FINDINGS:  The cervical lordosis is preserved.  The vertebral body heights are maintained without evidence of an acute fracture.  The prevertebral soft tissues are unremarkable there is grade 1 anterolisthesis of C4 on C5 and C7 on T1.  Moderate to severe spondylosis of the cervical spine with multi level marginal endplate osteophytes, facet arthropathy, and uncovertebral spurring.  There is moderate to severe disc height loss of C5-6 and C6-7 with moderate disc height loss of C7-T1 and C4-C5.  Lung apices are clear.  The paraspinal soft tissues are unremarkable.    Impression  No acute osseous findings.  Stable degenerative changes as described in the body of the report.      Electronically signed by: Speedy Chandra  Date:    05/17/2025  Time:    09:07    Lumbar Spine  MRI Imaging  Results for orders placed during the hospital encounter of 05/27/25    MRI Lumbar Spine Without Contrast    Narrative  EXAMINATION:  MRI LUMBAR SPINE WITHOUT  CONTRAST:    CLINICAL HISTORY:  Dorsalgia, unspecified; Low back pain, symptoms persist with > 6wks conservative treatment;lumbar spondylolithesis;.    COMPARISON:  None available    FINDINGS:  Serial axial and sagittal 1.5 Nevaeh images were obtained of the lumbar spine using T1 and T2- weighted techniques  without  the administration of IV contrast. Vertebral body height is grossly intact.  There is bilateral spondylolysis at L1 with grade 1 anterolisthesis of L5 on S1.  There is slight  retrolisthesis of L4 on L5.  Conus medullaris normal size and caliber.  No abnormal intramedullary or intradural mass identified without the administration of IV contrast.  Disc space narrowing, degenerative disc change, osteophyte formation, facet arthrosis, and degenerative endplate change are identified.  No abnormal paraspinal mass identified without the administration of IV contrast.  There is heterogeneity of bone marrow signal intensity diffusely throughout.    T12-L1: No significant central spinal or neural foraminal stenosis.  Mild facet arthrosis    L1-2: No significant central spinal or neural foraminal stenosis.  Minimal posterior disc bulge.  Mild facet arthrosis    L2-3: Mild encroachment in the central spinal canal and neural foramina bilaterally secondary to posterior disc bulge, facet arthrosis, and ligament hypertrophy    L3-4: Mild to moderate encroachment into the central spinal canal and neural foramina bilaterally secondary to posterior disc bulge, posterior osteophyte formation, facet arthrosis, and ligament hypertrophy.    L4-5: Moderate encroachment into the left neural foramina and mild encroachment into the right neural foramina secondary to posterior disc bulge, slight retrolisthesis of L4 on L5, and facet arthrosis    L5-S1: Severe encroachment into the neural foramina and moderate encroachment the central spinal canal secondary to anterolisthesis of L5 on S1, posterior bulge, posterior osteophyte formation, facet arthrosis, and ligament hypertrophy.    Impression  1. Bulla at L5 with grade 1 anterolisthesis listhesis of on S1  2. Slight the retrolisthesis of L4 on L5  3. Multilevel encroachment into the central spinal canal and neural foramina as described; most severe at L5-S1  4. Lumbar spondylosis  5. Findings and other details as above      Electronically signed by: Alejandro Prabhakar  Date:    05/27/2025  Time:    16:32      CT Imaging  No results found for this or any previous  visit.    Xray Imaging    Results for orders placed during the hospital encounter of 05/27/25    X-Ray Lumbar Complete Including Flex And Ext    Narrative  EXAMINATION:  XR LUMBAR SPINE 5 VIEW WITH FLEX AND EXT    CLINICAL HISTORY:  Dorsalgia, unspecified; .    COMPARISON:  None available.    FINDINGS:  AP, lateral, and oblique views as well as flexion and extension lateral views reveal vertebral body height is grossly intact.  There is grade 2 anterolisthesis of L5 on S1 with bilateral spondylolysis at L5.  Disc space narrowing, osteophyte formation, and facet arthrosis are identified.  Bony structures are osteopenic.  No significant abnormal vertebral body movement is seen on additional flexion and extension views.  Atherosclerosis seen within the aorta and branching vessels.  There is curvature of the lumbar spine with the convexity to left.  Five non-rib-bearing lumbar vertebral bodies are identified.    Impression  1. 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 Advanced lumbar spondylosis  3. Osteopenia  4. Levo lumbar scoliosis      Electronically signed by: Alejandro Prabhakar  Date:    05/27/2025  Time:    10:38      Labs of Relevance:  Chemistry:  Lab Results   Component Value Date     05/13/2025    K 4.2 05/13/2025    BUN 16.0 05/13/2025    CREATININE 0.72 05/13/2025    EGFRNORACEVR >60 05/13/2025    CALCIUM 9.5 05/13/2025    ALKPHOS 66 05/13/2025    ALBUMIN 4.1 05/13/2025    ALBUMIN 4.0 05/13/2025    BILIDIR 0.3 02/14/2022    IBILI 0.50 02/14/2022    AST 22 05/13/2025    ALT 22 05/13/2025    MDKNBEVO00ER 64.4 01/06/2023        Lab Results   Component Value Date    HGBA1C 5.2 09/13/2024    HGBA1C 5.2 03/13/2024        Hematology:  Lab Results   Component Value Date    WBC 3.41 (L) 06/02/2025    HGB 14.3 06/02/2025    HCT 44.0 06/02/2025     06/02/2025    IISDVMSE20 521 10/16/2024    FOLATE 14.8 05/13/2025      Lab Results   Component Value Date    INR 1.0 01/10/2024     INR 0.90 02/14/2022    PROTIME 10.3 01/10/2024    PROTIME 9.6 02/14/2022         Meet Dwyer MD  Interventional Pain Management  [facility]     Disclaimer:  This note was prepared using voice recognition system and is likely to have sound alike errors that may have been overlooked even after proof reading.  Please call me with any questions

## 2025-07-31 ENCOUNTER — OFFICE VISIT (OUTPATIENT)
Facility: CLINIC | Age: 70
End: 2025-07-31
Payer: MEDICARE

## 2025-07-31 VITALS
OXYGEN SATURATION: 97 % | WEIGHT: 143 LBS | HEART RATE: 58 BPM | SYSTOLIC BLOOD PRESSURE: 116 MMHG | BODY MASS INDEX: 23.82 KG/M2 | DIASTOLIC BLOOD PRESSURE: 78 MMHG | HEIGHT: 65 IN

## 2025-07-31 DIAGNOSIS — M47.22 CERVICAL SPONDYLOSIS WITH RADICULOPATHY: ICD-10-CM

## 2025-07-31 DIAGNOSIS — M54.12 CERVICAL RADICULOPATHY, CHRONIC: Primary | Chronic | ICD-10-CM

## 2025-07-31 DIAGNOSIS — M47.812 FACET ARTHRITIS OF CERVICAL REGION: ICD-10-CM

## 2025-07-31 PROCEDURE — 1101F PT FALLS ASSESS-DOCD LE1/YR: CPT | Mod: CPTII,,, | Performed by: STUDENT IN AN ORGANIZED HEALTH CARE EDUCATION/TRAINING PROGRAM

## 2025-07-31 PROCEDURE — 99204 OFFICE O/P NEW MOD 45 MIN: CPT | Mod: ,,, | Performed by: STUDENT IN AN ORGANIZED HEALTH CARE EDUCATION/TRAINING PROGRAM

## 2025-07-31 PROCEDURE — 1159F MED LIST DOCD IN RCRD: CPT | Mod: CPTII,,, | Performed by: STUDENT IN AN ORGANIZED HEALTH CARE EDUCATION/TRAINING PROGRAM

## 2025-07-31 PROCEDURE — 1125F AMNT PAIN NOTED PAIN PRSNT: CPT | Mod: CPTII,,, | Performed by: STUDENT IN AN ORGANIZED HEALTH CARE EDUCATION/TRAINING PROGRAM

## 2025-07-31 PROCEDURE — 3008F BODY MASS INDEX DOCD: CPT | Mod: CPTII,,, | Performed by: STUDENT IN AN ORGANIZED HEALTH CARE EDUCATION/TRAINING PROGRAM

## 2025-07-31 PROCEDURE — 3074F SYST BP LT 130 MM HG: CPT | Mod: CPTII,,, | Performed by: STUDENT IN AN ORGANIZED HEALTH CARE EDUCATION/TRAINING PROGRAM

## 2025-07-31 PROCEDURE — 3078F DIAST BP <80 MM HG: CPT | Mod: CPTII,,, | Performed by: STUDENT IN AN ORGANIZED HEALTH CARE EDUCATION/TRAINING PROGRAM

## 2025-07-31 PROCEDURE — 3288F FALL RISK ASSESSMENT DOCD: CPT | Mod: CPTII,,, | Performed by: STUDENT IN AN ORGANIZED HEALTH CARE EDUCATION/TRAINING PROGRAM

## 2025-07-31 RX ORDER — GABAPENTIN 300 MG/1
600 CAPSULE ORAL NIGHTLY
Qty: 60 CAPSULE | Refills: 0 | Status: SHIPPED | OUTPATIENT
Start: 2025-07-31 | End: 2025-08-30

## 2025-07-31 NOTE — ASSESSMENT & PLAN NOTE
The patient presents with neck and radiating upper extremity pain consistent with cervical radiculopathy, localized to the C5, C6, and C7 nerve root distribution. Symptoms include pain, paresthesias, and/or weakness corresponding to the affected dermatome, and have persisted for 10 year(s). The diagnosis is supported by clinical findings and correlating imaging, with MRI evidence of foraminal stenosis and/or disc herniation at the C5, C6, and C7 cervical level(s), causing compression or impingement of the corresponding nerve root. The patient has failed conservative management, which has included a combination of physical therapy, oral medications (e.g., NSAIDs, neuropathic agents, muscle relaxants), and activity modification. Despite these measures, the patient continues to experience functionally limiting symptoms affecting daily activities. A cervical interlaminar epidural steroid injection is recommended as a medically necessary intervention to reduce inflammation around the affected nerve root(s), relieve pain, and restore function. The procedure will be performed under fluoroscopic guidance to ensure accurate epidural placement and optimize clinical outcomes.

## 2025-08-12 RX ORDER — LORATADINE 10 MG/1
10 TABLET ORAL DAILY
COMMUNITY

## 2025-08-19 ENCOUNTER — HOSPITAL ENCOUNTER (OUTPATIENT)
Facility: HOSPITAL | Age: 70
Discharge: HOME | End: 2025-08-19
Attending: STUDENT IN AN ORGANIZED HEALTH CARE EDUCATION/TRAINING PROGRAM | Admitting: STUDENT IN AN ORGANIZED HEALTH CARE EDUCATION/TRAINING PROGRAM
Payer: MEDICARE

## 2025-08-19 DIAGNOSIS — M54.12 CERVICAL RADICULOPATHY: ICD-10-CM

## 2025-08-19 PROCEDURE — 25000003 PHARM REV CODE 250: Performed by: STUDENT IN AN ORGANIZED HEALTH CARE EDUCATION/TRAINING PROGRAM

## 2025-08-19 PROCEDURE — 63600175 PHARM REV CODE 636 W HCPCS: Performed by: STUDENT IN AN ORGANIZED HEALTH CARE EDUCATION/TRAINING PROGRAM

## 2025-08-19 PROCEDURE — 62321 NJX INTERLAMINAR CRV/THRC: CPT | Mod: ,,, | Performed by: STUDENT IN AN ORGANIZED HEALTH CARE EDUCATION/TRAINING PROGRAM

## 2025-08-19 PROCEDURE — 63600175 PHARM REV CODE 636 W HCPCS

## 2025-08-19 PROCEDURE — 62321 NJX INTERLAMINAR CRV/THRC: CPT | Performed by: STUDENT IN AN ORGANIZED HEALTH CARE EDUCATION/TRAINING PROGRAM

## 2025-08-19 PROCEDURE — A4216 STERILE WATER/SALINE, 10 ML: HCPCS | Performed by: STUDENT IN AN ORGANIZED HEALTH CARE EDUCATION/TRAINING PROGRAM

## 2025-08-19 RX ORDER — MIDAZOLAM HYDROCHLORIDE 2 MG/2ML
INJECTION, SOLUTION INTRAMUSCULAR; INTRAVENOUS
Status: COMPLETED
Start: 2025-08-19 | End: 2025-08-19

## 2025-08-19 RX ORDER — BETAMETHASONE SODIUM PHOSPHATE AND BETAMETHASONE ACETATE 3; 3 MG/ML; MG/ML
INJECTION, SUSPENSION INTRA-ARTICULAR; INTRALESIONAL; INTRAMUSCULAR; SOFT TISSUE
Status: DISCONTINUED | OUTPATIENT
Start: 2025-08-19 | End: 2025-08-19 | Stop reason: HOSPADM

## 2025-08-19 RX ORDER — SODIUM CHLORIDE 0.9 % (FLUSH) 0.9 %
SYRINGE (ML) INJECTION
Status: DISCONTINUED | OUTPATIENT
Start: 2025-08-19 | End: 2025-08-19 | Stop reason: HOSPADM

## 2025-08-19 RX ORDER — LIDOCAINE HYDROCHLORIDE 10 MG/ML
INJECTION, SOLUTION INFILTRATION; PERINEURAL
Status: DISCONTINUED | OUTPATIENT
Start: 2025-08-19 | End: 2025-08-19 | Stop reason: HOSPADM

## 2025-08-19 RX ADMIN — MIDAZOLAM HYDROCHLORIDE 2 MG: 1 INJECTION, SOLUTION INTRAMUSCULAR; INTRAVENOUS at 03:08

## 2025-08-22 VITALS
OXYGEN SATURATION: 97 % | HEART RATE: 69 BPM | SYSTOLIC BLOOD PRESSURE: 127 MMHG | DIASTOLIC BLOOD PRESSURE: 82 MMHG | TEMPERATURE: 98 F | HEIGHT: 65 IN | WEIGHT: 143 LBS | BODY MASS INDEX: 23.82 KG/M2 | RESPIRATION RATE: 14 BRPM

## 2025-09-04 ENCOUNTER — OFFICE VISIT (OUTPATIENT)
Facility: CLINIC | Age: 70
End: 2025-09-04
Payer: MEDICARE

## 2025-09-04 VITALS
HEART RATE: 67 BPM | HEIGHT: 65 IN | SYSTOLIC BLOOD PRESSURE: 127 MMHG | WEIGHT: 143 LBS | OXYGEN SATURATION: 94 % | BODY MASS INDEX: 23.82 KG/M2 | DIASTOLIC BLOOD PRESSURE: 83 MMHG

## 2025-09-04 DIAGNOSIS — M54.12 CERVICAL RADICULOPATHY, CHRONIC: ICD-10-CM

## 2025-09-04 DIAGNOSIS — M47.812 FACET ARTHRITIS OF CERVICAL REGION: Primary | ICD-10-CM

## 2025-09-04 PROCEDURE — 1125F AMNT PAIN NOTED PAIN PRSNT: CPT | Mod: CPTII,,, | Performed by: STUDENT IN AN ORGANIZED HEALTH CARE EDUCATION/TRAINING PROGRAM

## 2025-09-04 PROCEDURE — 99214 OFFICE O/P EST MOD 30 MIN: CPT | Mod: ,,, | Performed by: STUDENT IN AN ORGANIZED HEALTH CARE EDUCATION/TRAINING PROGRAM

## 2025-09-04 PROCEDURE — 3288F FALL RISK ASSESSMENT DOCD: CPT | Mod: CPTII,,, | Performed by: STUDENT IN AN ORGANIZED HEALTH CARE EDUCATION/TRAINING PROGRAM

## 2025-09-04 PROCEDURE — 3074F SYST BP LT 130 MM HG: CPT | Mod: CPTII,,, | Performed by: STUDENT IN AN ORGANIZED HEALTH CARE EDUCATION/TRAINING PROGRAM

## 2025-09-04 PROCEDURE — 1159F MED LIST DOCD IN RCRD: CPT | Mod: CPTII,,, | Performed by: STUDENT IN AN ORGANIZED HEALTH CARE EDUCATION/TRAINING PROGRAM

## 2025-09-04 PROCEDURE — 3008F BODY MASS INDEX DOCD: CPT | Mod: CPTII,,, | Performed by: STUDENT IN AN ORGANIZED HEALTH CARE EDUCATION/TRAINING PROGRAM

## 2025-09-04 PROCEDURE — 3079F DIAST BP 80-89 MM HG: CPT | Mod: CPTII,,, | Performed by: STUDENT IN AN ORGANIZED HEALTH CARE EDUCATION/TRAINING PROGRAM

## 2025-09-04 PROCEDURE — 1101F PT FALLS ASSESS-DOCD LE1/YR: CPT | Mod: CPTII,,, | Performed by: STUDENT IN AN ORGANIZED HEALTH CARE EDUCATION/TRAINING PROGRAM

## 2025-09-04 RX ORDER — ALENDRONATE SODIUM 70 MG/1
70 TABLET ORAL
COMMUNITY
Start: 2025-08-01

## 2025-09-04 RX ORDER — PREGABALIN 100 MG/1
100 CAPSULE ORAL 2 TIMES DAILY
Qty: 60 CAPSULE | Refills: 0 | Status: SHIPPED | OUTPATIENT
Start: 2025-09-04 | End: 2025-10-04

## (undated) DEVICE — TOWEL OR DISP STRL BLUE 4/PK

## (undated) DEVICE — SOL IRRI STRL WATER 1000ML

## (undated) DEVICE — TRAY NERVE BLOCK

## (undated) DEVICE — PACK ENDOSCOPY GENERAL

## (undated) DEVICE — TUBE WATER AUXILIARY

## (undated) DEVICE — GLOVE SENSICARE PI MICRO 6.5

## (undated) DEVICE — CONTRAST ISOVUE M 200 20ML VIL

## (undated) DEVICE — Device

## (undated) DEVICE — CHLORAPREP 10.5 ML APPLICATOR

## (undated) DEVICE — ADAPTER DUAL NSL LUER M-M 7FT

## (undated) DEVICE — MARKER SKIN REG TIP RULER LAB

## (undated) DEVICE — NDL BLNT STD 1.5IN 18G

## (undated) DEVICE — BANDAGE CURAD ADH 0.75X3IN

## (undated) DEVICE — SYR W NDL BLN FILL 5ML 18GX1.5

## (undated) DEVICE — SYR 10CC LUER LOCK